# Patient Record
Sex: FEMALE | Race: BLACK OR AFRICAN AMERICAN | NOT HISPANIC OR LATINO | URBAN - METROPOLITAN AREA
[De-identification: names, ages, dates, MRNs, and addresses within clinical notes are randomized per-mention and may not be internally consistent; named-entity substitution may affect disease eponyms.]

---

## 2017-05-10 ENCOUNTER — IMPORTED ENCOUNTER (OUTPATIENT)
Dept: URBAN - METROPOLITAN AREA CLINIC 1 | Facility: CLINIC | Age: 72
End: 2017-05-10

## 2017-05-10 PROBLEM — H02.834: Noted: 2017-05-10

## 2017-05-10 PROBLEM — H04.123: Noted: 2017-05-10

## 2017-05-10 PROBLEM — H02.831: Noted: 2017-05-10

## 2017-05-10 PROBLEM — H16.143: Noted: 2017-05-10

## 2017-05-10 PROBLEM — H25.813: Noted: 2017-05-10

## 2017-05-10 PROCEDURE — 92015 DETERMINE REFRACTIVE STATE: CPT

## 2017-05-10 PROCEDURE — 92014 COMPRE OPH EXAM EST PT 1/>: CPT

## 2017-05-10 NOTE — PATIENT DISCUSSION
1.  Cataract OU: Observe for now without intervention. The patient was advised to contact us if any change or worsening of vision2. VALENTIN w/ PEK OU-The use/continuation of artificial tears were recommended. 3.  Dermatochalasis OU UL's  - Follow with no intervention at this time. MRX for glasses givenReturn for an appointment in 6 months 10/DFE/glare with Dr. Mojgan Evans.

## 2017-09-18 ENCOUNTER — HOSPITAL ENCOUNTER (OUTPATIENT)
Dept: MAMMOGRAPHY | Age: 72
Discharge: HOME | End: 2017-09-18
Attending: INTERNAL MEDICINE
Payer: MEDICARE

## 2017-09-18 DIAGNOSIS — Z12.31 VISIT FOR SCREENING MAMMOGRAM: ICD-10-CM

## 2017-09-18 PROCEDURE — 77063 BREAST TOMOSYNTHESIS BI: CPT

## 2018-02-19 ENCOUNTER — HOSPITAL ENCOUNTER (OUTPATIENT)
Dept: PREADMISSION TESTING | Age: 73
Discharge: HOME | End: 2018-02-19
Payer: MEDICARE

## 2018-02-19 LAB
ALBUMIN SERPL-MCNC: 3.4 G/DL (ref 3.4–5)
ALBUMIN/GLOB SERPL: 0.9 {RATIO} (ref 0.8–1.7)
ALP SERPL-CCNC: 80 U/L (ref 45–117)
ALT SERPL-CCNC: 22 U/L (ref 13–56)
ANION GAP SERPL CALC-SCNC: 8 MMOL/L (ref 3–18)
APPEARANCE UR: CLEAR
APTT PPP: 28.2 SEC (ref 23–36.4)
AST SERPL-CCNC: 17 U/L (ref 15–37)
ATRIAL RATE: 66 BPM
BACTERIA SPEC CULT: NORMAL
BASOPHILS # BLD: 0 K/UL (ref 0–0.06)
BASOPHILS NFR BLD: 1 % (ref 0–2)
BILIRUB SERPL-MCNC: 0.5 MG/DL (ref 0.2–1)
BILIRUB UR QL: NEGATIVE
BUN SERPL-MCNC: 18 MG/DL (ref 7–18)
BUN/CREAT SERPL: 24 (ref 12–20)
CALCIUM SERPL-MCNC: 9.2 MG/DL (ref 8.5–10.1)
CALCULATED P AXIS, ECG09: 60 DEGREES
CALCULATED R AXIS, ECG10: 55 DEGREES
CALCULATED T AXIS, ECG11: 50 DEGREES
CHLORIDE SERPL-SCNC: 105 MMOL/L (ref 100–108)
CO2 SERPL-SCNC: 30 MMOL/L (ref 21–32)
COLOR UR: YELLOW
CREAT SERPL-MCNC: 0.75 MG/DL (ref 0.6–1.3)
DIAGNOSIS, 93000: NORMAL
DIFFERENTIAL METHOD BLD: ABNORMAL
EOSINOPHIL # BLD: 0.2 K/UL (ref 0–0.4)
EOSINOPHIL NFR BLD: 4 % (ref 0–5)
ERYTHROCYTE [DISTWIDTH] IN BLOOD BY AUTOMATED COUNT: 15.8 % (ref 11.6–14.5)
ERYTHROCYTE [SEDIMENTATION RATE] IN BLOOD: 20 MM/HR (ref 0–30)
EST. AVERAGE GLUCOSE BLD GHB EST-MCNC: 131 MG/DL
GLOBULIN SER CALC-MCNC: 3.6 G/DL (ref 2–4)
GLUCOSE SERPL-MCNC: 97 MG/DL (ref 74–99)
GLUCOSE UR STRIP.AUTO-MCNC: NEGATIVE MG/DL
HBA1C MFR BLD: 6.2 % (ref 4.5–5.6)
HCT VFR BLD AUTO: 40.7 % (ref 35–45)
HGB BLD-MCNC: 12.6 G/DL (ref 12–16)
HGB UR QL STRIP: NEGATIVE
INR PPP: 1.1 (ref 0.8–1.2)
KETONES UR QL STRIP.AUTO: NEGATIVE MG/DL
LEUKOCYTE ESTERASE UR QL STRIP.AUTO: NEGATIVE
LYMPHOCYTES # BLD: 2.1 K/UL (ref 0.9–3.6)
LYMPHOCYTES NFR BLD: 45 % (ref 21–52)
MCH RBC QN AUTO: 25.5 PG (ref 24–34)
MCHC RBC AUTO-ENTMCNC: 31 G/DL (ref 31–37)
MCV RBC AUTO: 82.4 FL (ref 74–97)
MONOCYTES # BLD: 0.3 K/UL (ref 0.05–1.2)
MONOCYTES NFR BLD: 6 % (ref 3–10)
NEUTS SEG # BLD: 2.1 K/UL (ref 1.8–8)
NEUTS SEG NFR BLD: 44 % (ref 40–73)
NITRITE UR QL STRIP.AUTO: NEGATIVE
P-R INTERVAL, ECG05: 182 MS
PH UR STRIP: 7 [PH] (ref 5–8)
PLATELET # BLD AUTO: 128 K/UL (ref 135–420)
POTASSIUM SERPL-SCNC: 3.6 MMOL/L (ref 3.5–5.5)
PROT SERPL-MCNC: 7 G/DL (ref 6.4–8.2)
PROT UR STRIP-MCNC: NEGATIVE MG/DL
PROTHROMBIN TIME: 13.4 SEC (ref 11.5–15.2)
Q-T INTERVAL, ECG07: 424 MS
QRS DURATION, ECG06: 84 MS
QTC CALCULATION (BEZET), ECG08: 444 MS
RBC # BLD AUTO: 4.94 M/UL (ref 4.2–5.3)
SERVICE CMNT-IMP: NORMAL
SODIUM SERPL-SCNC: 143 MMOL/L (ref 136–145)
SP GR UR REFRACTOMETRY: 1.02 (ref 1–1.03)
UROBILINOGEN UR QL STRIP.AUTO: 1 EU/DL (ref 0.2–1)
VENTRICULAR RATE, ECG03: 66 BPM
WBC # BLD AUTO: 4.7 K/UL (ref 4.6–13.2)

## 2018-02-19 PROCEDURE — 85025 COMPLETE CBC W/AUTO DIFF WBC: CPT | Performed by: ORTHOPAEDIC SURGERY

## 2018-02-19 PROCEDURE — 36415 COLL VENOUS BLD VENIPUNCTURE: CPT | Performed by: ORTHOPAEDIC SURGERY

## 2018-02-19 PROCEDURE — 85730 THROMBOPLASTIN TIME PARTIAL: CPT | Performed by: ORTHOPAEDIC SURGERY

## 2018-02-19 PROCEDURE — 85610 PROTHROMBIN TIME: CPT | Performed by: ORTHOPAEDIC SURGERY

## 2018-02-19 PROCEDURE — 81003 URINALYSIS AUTO W/O SCOPE: CPT | Performed by: ORTHOPAEDIC SURGERY

## 2018-02-19 PROCEDURE — 85652 RBC SED RATE AUTOMATED: CPT | Performed by: ORTHOPAEDIC SURGERY

## 2018-02-19 PROCEDURE — 93005 ELECTROCARDIOGRAM TRACING: CPT

## 2018-02-19 PROCEDURE — 80053 COMPREHEN METABOLIC PANEL: CPT | Performed by: ORTHOPAEDIC SURGERY

## 2018-02-19 PROCEDURE — 87641 MR-STAPH DNA AMP PROBE: CPT | Performed by: ORTHOPAEDIC SURGERY

## 2018-02-19 PROCEDURE — 83036 HEMOGLOBIN GLYCOSYLATED A1C: CPT | Performed by: ORTHOPAEDIC SURGERY

## 2018-03-12 ENCOUNTER — ANESTHESIA EVENT (OUTPATIENT)
Dept: SURGERY | Age: 73
DRG: 470 | End: 2018-03-12
Payer: MEDICARE

## 2018-03-14 PROBLEM — M17.11 OSTEOARTHRITIS OF RIGHT KNEE: Chronic | Status: ACTIVE | Noted: 2018-03-14

## 2018-03-14 NOTE — H&P
[facility]  History and Physical Exam    Patient: Paulina Muñoz MRN: [id number]  SSN: [id number]    YOB: 1945  Age: 72 y.o. Sex: female      Subjective:      Chief Complaint: Right knee pain    History of Present Illness:  Patient complains of pain to the right knee and difficulty ambulating, which has progressively worsened over several months. X-rays showed osteoarthritis of the joint. The patient's pain has persisted and progressed despite conservative treatments and therapies. The patient has been previously treated with NSAIDs. The patient has at this time opted for surgical intervention. Past Medical History:   Diagnosis Date    Arthritis     knee    Hypertension     20 years    Ill-defined condition age 31    Sarcoidosis    Menopause     Osteoarthritis of right knee 3/14/2018    Thyroid disease     6 years     Past Surgical History:   Procedure Laterality Date    CHEST SURGERY PROCEDURE UNLISTED  27 y ago    lung biopsy for sarcoisosis    HX GYN  36 y ago    tubal pregnancy    HX HYSTERECTOMY  36 y ago     Social History     Occupational History    Not on file. Social History Main Topics    Smoking status: Never Smoker    Smokeless tobacco: Never Used    Alcohol use No    Drug use: No    Sexual activity: Not on file     Prior to Admission medications    Medication Sig Start Date End Date Taking? Authorizing Provider   potassium chloride SR (KLOR-CON 10) 10 mEq tablet Take 20 mEq by mouth. Historical Provider   levothyroxine (SYNTHROID) 75 mcg tablet Take  by mouth daily. Historical Provider   hydroCHLOROthiazide (HYDRODIURIL) 12.5 mg tablet Take 12.5 mg by mouth daily. Indications: hypertension    Historical Provider   calcium carbonate (OS-ALAYNA) 500 mg calcium (1,250 mg) tablet Take  by mouth daily. Historical Provider   valsartan (DIOVAN) 320 mg tablet Take 320 mg by mouth daily.     Historical Provider Allergies: Allergies   Allergen Reactions    Pcn [Penicillins] Rash        Review of Systems:  Pertinent items are noted in the History of Present Illness. Objective:       Physical Exam:  HEENT: Normocephalic, atraumatic  Lungs:  Clear to auscultation  Heart:   Regular rate and rhythm  Abdomen: Soft  Extremities:  Pain with range of motion of the right knee. Active ROM limited due to pain. Tenderness generalized. Crepitus present. Antalgic gait. Assessment:      Arthritis of the right knee. Plan:       Proceed with scheduled RIGHT TOTAL KNEE ARTHROPLASTY. Due to past medical history significant for HTN, sarcoidosis and thrombocytopenia, this patient may benefit from an inpatient admission for post operative monitoring of comorbid conditions. The various methods of treatment have been discussed with the patient and family. After consideration of risks, benefits, and other options for treatment, the patient has consented to surgical interventions. Questions were answered and preoperative teaching was done by Dr Cele Thorpe.      Signed By: CHRISTIAN Ann     March 14, 2018

## 2018-03-19 ENCOUNTER — HOSPITAL ENCOUNTER (INPATIENT)
Age: 73
LOS: 1 days | Discharge: HOME HEALTH | DRG: 470 | End: 2018-03-20
Attending: ORTHOPAEDIC SURGERY | Admitting: ORTHOPAEDIC SURGERY
Payer: MEDICARE

## 2018-03-19 ENCOUNTER — APPOINTMENT (OUTPATIENT)
Dept: GENERAL RADIOLOGY | Age: 73
DRG: 470 | End: 2018-03-19
Attending: PHYSICIAN ASSISTANT
Payer: MEDICARE

## 2018-03-19 ENCOUNTER — ANESTHESIA (OUTPATIENT)
Dept: SURGERY | Age: 73
DRG: 470 | End: 2018-03-19
Payer: MEDICARE

## 2018-03-19 DIAGNOSIS — M17.11 PRIMARY OSTEOARTHRITIS OF RIGHT KNEE: Primary | Chronic | ICD-10-CM

## 2018-03-19 LAB
ABO + RH BLD: NORMAL
BLOOD GROUP ANTIBODIES SERPL: NORMAL
GLUCOSE BLD STRIP.AUTO-MCNC: 104 MG/DL (ref 70–110)
SPECIMEN EXP DATE BLD: NORMAL

## 2018-03-19 PROCEDURE — 77030016060 HC NDL NRV BLK TELE -A: Performed by: ANESTHESIOLOGY

## 2018-03-19 PROCEDURE — 77030027138 HC INCENT SPIROMETER -A: Performed by: ORTHOPAEDIC SURGERY

## 2018-03-19 PROCEDURE — C9290 INJ, BUPIVACAINE LIPOSOME: HCPCS | Performed by: ORTHOPAEDIC SURGERY

## 2018-03-19 PROCEDURE — 76060000033 HC ANESTHESIA 1 TO 1.5 HR: Performed by: ORTHOPAEDIC SURGERY

## 2018-03-19 PROCEDURE — 74011250637 HC RX REV CODE- 250/637: Performed by: ANESTHESIOLOGY

## 2018-03-19 PROCEDURE — 82962 GLUCOSE BLOOD TEST: CPT

## 2018-03-19 PROCEDURE — 0SRC0J9 REPLACEMENT OF RIGHT KNEE JOINT WITH SYNTHETIC SUBSTITUTE, CEMENTED, OPEN APPROACH: ICD-10-PCS | Performed by: ORTHOPAEDIC SURGERY

## 2018-03-19 PROCEDURE — 74011250636 HC RX REV CODE- 250/636: Performed by: ORTHOPAEDIC SURGERY

## 2018-03-19 PROCEDURE — 77030012508 HC MSK AIRWY LMA AMBU -A: Performed by: ANESTHESIOLOGY

## 2018-03-19 PROCEDURE — 74011250636 HC RX REV CODE- 250/636: Performed by: ANESTHESIOLOGY

## 2018-03-19 PROCEDURE — 86900 BLOOD TYPING SEROLOGIC ABO: CPT | Performed by: ORTHOPAEDIC SURGERY

## 2018-03-19 PROCEDURE — 76942 ECHO GUIDE FOR BIOPSY: CPT | Performed by: ORTHOPAEDIC SURGERY

## 2018-03-19 PROCEDURE — 74011250637 HC RX REV CODE- 250/637: Performed by: PHYSICIAN ASSISTANT

## 2018-03-19 PROCEDURE — 77030020259 HC SOL INJ SOD CL 0.9% 100ML BG: Performed by: ORTHOPAEDIC SURGERY

## 2018-03-19 PROCEDURE — 74011250636 HC RX REV CODE- 250/636

## 2018-03-19 PROCEDURE — 97161 PT EVAL LOW COMPLEX 20 MIN: CPT

## 2018-03-19 PROCEDURE — 77030032489 HC SLV COMPR SCD FT CUF COVD -B: Performed by: ORTHOPAEDIC SURGERY

## 2018-03-19 PROCEDURE — G8979 MOBILITY GOAL STATUS: HCPCS

## 2018-03-19 PROCEDURE — 77030020407 HC IV BLD WRMR ST 3M -A: Performed by: ANESTHESIOLOGY

## 2018-03-19 PROCEDURE — G8978 MOBILITY CURRENT STATUS: HCPCS

## 2018-03-19 PROCEDURE — C1776 JOINT DEVICE (IMPLANTABLE): HCPCS | Performed by: ORTHOPAEDIC SURGERY

## 2018-03-19 PROCEDURE — 76010000149 HC OR TIME 1 TO 1.5 HR: Performed by: ORTHOPAEDIC SURGERY

## 2018-03-19 PROCEDURE — C1713 ANCHOR/SCREW BN/BN,TIS/BN: HCPCS | Performed by: ORTHOPAEDIC SURGERY

## 2018-03-19 PROCEDURE — 77030002934 HC SUT MCRYL J&J -B: Performed by: ORTHOPAEDIC SURGERY

## 2018-03-19 PROCEDURE — 77030037876 HC DRSG MEPILEX 16-48IN BORD MOLN -A: Performed by: ORTHOPAEDIC SURGERY

## 2018-03-19 PROCEDURE — 36415 COLL VENOUS BLD VENIPUNCTURE: CPT | Performed by: ORTHOPAEDIC SURGERY

## 2018-03-19 PROCEDURE — 77030034694 HC SCPL CANADY PLSM DISP USMD -E: Performed by: ORTHOPAEDIC SURGERY

## 2018-03-19 PROCEDURE — 73560 X-RAY EXAM OF KNEE 1 OR 2: CPT

## 2018-03-19 PROCEDURE — 97116 GAIT TRAINING THERAPY: CPT

## 2018-03-19 PROCEDURE — 77030020782 HC GWN BAIR PAWS FLX 3M -B: Performed by: ORTHOPAEDIC SURGERY

## 2018-03-19 PROCEDURE — 77030003666 HC NDL SPINAL BD -A: Performed by: ORTHOPAEDIC SURGERY

## 2018-03-19 PROCEDURE — 77030020813 HC INST SCULP CEM KT DISP S&N -B: Performed by: ORTHOPAEDIC SURGERY

## 2018-03-19 PROCEDURE — 74011000250 HC RX REV CODE- 250

## 2018-03-19 PROCEDURE — 74011000258 HC RX REV CODE- 258: Performed by: ORTHOPAEDIC SURGERY

## 2018-03-19 PROCEDURE — 77030018836 HC SOL IRR NACL ICUM -A: Performed by: ORTHOPAEDIC SURGERY

## 2018-03-19 PROCEDURE — 74011000250 HC RX REV CODE- 250: Performed by: ORTHOPAEDIC SURGERY

## 2018-03-19 PROCEDURE — 77030038011: Performed by: ORTHOPAEDIC SURGERY

## 2018-03-19 PROCEDURE — 77030013708 HC HNDPC SUC IRR PULS STRY –B: Performed by: ORTHOPAEDIC SURGERY

## 2018-03-19 PROCEDURE — 77030011640 HC PAD GRND REM COVD -A: Performed by: ORTHOPAEDIC SURGERY

## 2018-03-19 PROCEDURE — 76210000006 HC OR PH I REC 0.5 TO 1 HR: Performed by: ORTHOPAEDIC SURGERY

## 2018-03-19 PROCEDURE — 77030038010: Performed by: ORTHOPAEDIC SURGERY

## 2018-03-19 PROCEDURE — 77030031139 HC SUT VCRL2 J&J -A: Performed by: ORTHOPAEDIC SURGERY

## 2018-03-19 PROCEDURE — 3E0T3BZ INTRODUCTION OF ANESTHETIC AGENT INTO PERIPHERAL NERVES AND PLEXI, PERCUTANEOUS APPROACH: ICD-10-PCS | Performed by: ANESTHESIOLOGY

## 2018-03-19 PROCEDURE — 65270000029 HC RM PRIVATE

## 2018-03-19 PROCEDURE — 64447 NJX AA&/STRD FEMORAL NRV IMG: CPT | Performed by: ANESTHESIOLOGY

## 2018-03-19 PROCEDURE — 77030012893

## 2018-03-19 PROCEDURE — 77030011628: Performed by: ORTHOPAEDIC SURGERY

## 2018-03-19 PROCEDURE — 77010033678 HC OXYGEN DAILY

## 2018-03-19 PROCEDURE — 74011250636 HC RX REV CODE- 250/636: Performed by: PHYSICIAN ASSISTANT

## 2018-03-19 PROCEDURE — 77030037400 HC ADH TISS HI VISC EXOFIN CHMP -B: Performed by: ORTHOPAEDIC SURGERY

## 2018-03-19 DEVICE — COMPONENT FEM SZ 4 R KNEE POST STBL CEM ATTUNE: Type: IMPLANTABLE DEVICE | Site: KNEE | Status: FUNCTIONAL

## 2018-03-19 DEVICE — INSERT TIB RP FEM KNEE CEM: Type: IMPLANTABLE DEVICE | Site: KNEE | Status: FUNCTIONAL

## 2018-03-19 DEVICE — COMPONENT PAT DIA35MM KNEE POLY DOME CEM MEDIALIZED ATTUNE: Type: IMPLANTABLE DEVICE | Site: KNEE | Status: FUNCTIONAL

## 2018-03-19 DEVICE — CEMENT BNE 20GM HALF DOSE PMMA VISC RADPQ FAST: Type: IMPLANTABLE DEVICE | Site: KNEE | Status: FUNCTIONAL

## 2018-03-19 RX ORDER — DIPHENHYDRAMINE HCL 25 MG
25 CAPSULE ORAL
Status: DISCONTINUED | OUTPATIENT
Start: 2018-03-19 | End: 2018-03-20 | Stop reason: HOSPADM

## 2018-03-19 RX ORDER — POTASSIUM CHLORIDE 20 MEQ/1
20 TABLET, EXTENDED RELEASE ORAL DAILY
Status: DISCONTINUED | OUTPATIENT
Start: 2018-03-20 | End: 2018-03-20 | Stop reason: HOSPADM

## 2018-03-19 RX ORDER — HYDROMORPHONE HYDROCHLORIDE 2 MG/ML
0.2 INJECTION, SOLUTION INTRAMUSCULAR; INTRAVENOUS; SUBCUTANEOUS
Status: DISCONTINUED | OUTPATIENT
Start: 2018-03-19 | End: 2018-03-19 | Stop reason: HOSPADM

## 2018-03-19 RX ORDER — ASPIRIN 81 MG/1
81 TABLET ORAL 2 TIMES DAILY
Qty: 42 TAB | Refills: 0 | Status: SHIPPED | OUTPATIENT
Start: 2018-03-19 | End: 2018-04-09

## 2018-03-19 RX ORDER — ACETAMINOPHEN 10 MG/ML
1000 INJECTION, SOLUTION INTRAVENOUS ONCE
Status: COMPLETED | OUTPATIENT
Start: 2018-03-19 | End: 2018-03-19

## 2018-03-19 RX ORDER — ROPIVACAINE HYDROCHLORIDE 5 MG/ML
INJECTION, SOLUTION EPIDURAL; INFILTRATION; PERINEURAL AS NEEDED
Status: DISCONTINUED | OUTPATIENT
Start: 2018-03-19 | End: 2018-03-19 | Stop reason: HOSPADM

## 2018-03-19 RX ORDER — HYDROCHLOROTHIAZIDE 25 MG/1
12.5 TABLET ORAL DAILY
Status: DISCONTINUED | OUTPATIENT
Start: 2018-03-20 | End: 2018-03-20 | Stop reason: HOSPADM

## 2018-03-19 RX ORDER — CALCIUM CARBONATE 500(1250)
500 TABLET ORAL DAILY
Status: DISCONTINUED | OUTPATIENT
Start: 2018-03-20 | End: 2018-03-20 | Stop reason: HOSPADM

## 2018-03-19 RX ORDER — LIDOCAINE HYDROCHLORIDE 20 MG/ML
INJECTION, SOLUTION EPIDURAL; INFILTRATION; INTRACAUDAL; PERINEURAL AS NEEDED
Status: DISCONTINUED | OUTPATIENT
Start: 2018-03-19 | End: 2018-03-19 | Stop reason: HOSPADM

## 2018-03-19 RX ORDER — DEXAMETHASONE SODIUM PHOSPHATE 4 MG/ML
8 INJECTION, SOLUTION INTRA-ARTICULAR; INTRALESIONAL; INTRAMUSCULAR; INTRAVENOUS; SOFT TISSUE ONCE
Status: DISCONTINUED | OUTPATIENT
Start: 2018-03-19 | End: 2018-03-19 | Stop reason: DRUGHIGH

## 2018-03-19 RX ORDER — ASPIRIN 81 MG/1
81 TABLET ORAL 2 TIMES DAILY
Status: DISCONTINUED | OUTPATIENT
Start: 2018-03-19 | End: 2018-03-20 | Stop reason: HOSPADM

## 2018-03-19 RX ORDER — ONDANSETRON 2 MG/ML
4 INJECTION INTRAMUSCULAR; INTRAVENOUS
Status: DISCONTINUED | OUTPATIENT
Start: 2018-03-19 | End: 2018-03-20 | Stop reason: HOSPADM

## 2018-03-19 RX ORDER — DEXAMETHASONE SODIUM PHOSPHATE 4 MG/ML
4 INJECTION, SOLUTION INTRA-ARTICULAR; INTRALESIONAL; INTRAMUSCULAR; INTRAVENOUS; SOFT TISSUE ONCE
Status: COMPLETED | OUTPATIENT
Start: 2018-03-19 | End: 2018-03-19

## 2018-03-19 RX ORDER — VALSARTAN 160 MG/1
320 TABLET ORAL DAILY
Status: DISCONTINUED | OUTPATIENT
Start: 2018-03-20 | End: 2018-03-20 | Stop reason: HOSPADM

## 2018-03-19 RX ORDER — TRANEXAMIC ACID 650 1/1
1950 TABLET ORAL ONCE
Status: COMPLETED | OUTPATIENT
Start: 2018-03-19 | End: 2018-03-19

## 2018-03-19 RX ORDER — DOCUSATE SODIUM 100 MG/1
100 CAPSULE, LIQUID FILLED ORAL 2 TIMES DAILY
Status: DISCONTINUED | OUTPATIENT
Start: 2018-03-19 | End: 2018-03-20 | Stop reason: HOSPADM

## 2018-03-19 RX ORDER — SODIUM CHLORIDE 0.9 % (FLUSH) 0.9 %
5-10 SYRINGE (ML) INJECTION AS NEEDED
Status: DISCONTINUED | OUTPATIENT
Start: 2018-03-19 | End: 2018-03-19 | Stop reason: HOSPADM

## 2018-03-19 RX ORDER — SODIUM CHLORIDE, SODIUM LACTATE, POTASSIUM CHLORIDE, CALCIUM CHLORIDE 600; 310; 30; 20 MG/100ML; MG/100ML; MG/100ML; MG/100ML
125 INJECTION, SOLUTION INTRAVENOUS CONTINUOUS
Status: DISCONTINUED | OUTPATIENT
Start: 2018-03-19 | End: 2018-03-19 | Stop reason: HOSPADM

## 2018-03-19 RX ORDER — OXYCODONE AND ACETAMINOPHEN 5; 325 MG/1; MG/1
TABLET ORAL
Qty: 60 TAB | Refills: 0 | Status: SHIPPED | OUTPATIENT
Start: 2018-03-19

## 2018-03-19 RX ORDER — LEVOTHYROXINE SODIUM 75 UG/1
75 TABLET ORAL DAILY
Status: DISCONTINUED | OUTPATIENT
Start: 2018-03-20 | End: 2018-03-20 | Stop reason: HOSPADM

## 2018-03-19 RX ORDER — VANCOMYCIN/0.9 % SOD CHLORIDE 1.5G/250ML
1500 PLASTIC BAG, INJECTION (ML) INTRAVENOUS EVERY 12 HOURS
Status: COMPLETED | OUTPATIENT
Start: 2018-03-19 | End: 2018-03-20

## 2018-03-19 RX ORDER — CELECOXIB 100 MG/1
200 CAPSULE ORAL
Status: COMPLETED | OUTPATIENT
Start: 2018-03-19 | End: 2018-03-19

## 2018-03-19 RX ORDER — OXYCODONE HYDROCHLORIDE 5 MG/1
5-10 TABLET ORAL
Status: DISCONTINUED | OUTPATIENT
Start: 2018-03-19 | End: 2018-03-20 | Stop reason: HOSPADM

## 2018-03-19 RX ORDER — HYDROMORPHONE HYDROCHLORIDE 2 MG/ML
INJECTION, SOLUTION INTRAMUSCULAR; INTRAVENOUS; SUBCUTANEOUS AS NEEDED
Status: DISCONTINUED | OUTPATIENT
Start: 2018-03-19 | End: 2018-03-19 | Stop reason: HOSPADM

## 2018-03-19 RX ORDER — ONDANSETRON 2 MG/ML
INJECTION INTRAMUSCULAR; INTRAVENOUS AS NEEDED
Status: DISCONTINUED | OUTPATIENT
Start: 2018-03-19 | End: 2018-03-19 | Stop reason: HOSPADM

## 2018-03-19 RX ORDER — LANOLIN ALCOHOL/MO/W.PET/CERES
1 CREAM (GRAM) TOPICAL 3 TIMES DAILY
Status: DISCONTINUED | OUTPATIENT
Start: 2018-03-19 | End: 2018-03-20 | Stop reason: HOSPADM

## 2018-03-19 RX ORDER — FENTANYL CITRATE 50 UG/ML
INJECTION, SOLUTION INTRAMUSCULAR; INTRAVENOUS AS NEEDED
Status: DISCONTINUED | OUTPATIENT
Start: 2018-03-19 | End: 2018-03-19 | Stop reason: HOSPADM

## 2018-03-19 RX ORDER — METOCLOPRAMIDE HYDROCHLORIDE 5 MG/ML
10 INJECTION INTRAMUSCULAR; INTRAVENOUS
Status: DISCONTINUED | OUTPATIENT
Start: 2018-03-19 | End: 2018-03-20 | Stop reason: HOSPADM

## 2018-03-19 RX ORDER — ZOLPIDEM TARTRATE 5 MG/1
5-10 TABLET ORAL
Status: DISCONTINUED | OUTPATIENT
Start: 2018-03-19 | End: 2018-03-20 | Stop reason: HOSPADM

## 2018-03-19 RX ORDER — PANTOPRAZOLE SODIUM 40 MG/1
40 TABLET, DELAYED RELEASE ORAL DAILY
Status: DISCONTINUED | OUTPATIENT
Start: 2018-03-19 | End: 2018-03-20 | Stop reason: HOSPADM

## 2018-03-19 RX ORDER — VANCOMYCIN/0.9 % SOD CHLORIDE 1.5G/250ML
1500 PLASTIC BAG, INJECTION (ML) INTRAVENOUS ONCE
Status: COMPLETED | OUTPATIENT
Start: 2018-03-19 | End: 2018-03-19

## 2018-03-19 RX ORDER — DEXMEDETOMIDINE HYDROCHLORIDE 4 UG/ML
INJECTION, SOLUTION INTRAVENOUS AS NEEDED
Status: DISCONTINUED | OUTPATIENT
Start: 2018-03-19 | End: 2018-03-19 | Stop reason: HOSPADM

## 2018-03-19 RX ORDER — KETOROLAC TROMETHAMINE 15 MG/ML
15 INJECTION, SOLUTION INTRAMUSCULAR; INTRAVENOUS EVERY 6 HOURS
Status: DISCONTINUED | OUTPATIENT
Start: 2018-03-19 | End: 2018-03-20 | Stop reason: HOSPADM

## 2018-03-19 RX ORDER — SODIUM CHLORIDE 9 MG/ML
125 INJECTION, SOLUTION INTRAVENOUS CONTINUOUS
Status: DISPENSED | OUTPATIENT
Start: 2018-03-19 | End: 2018-03-20

## 2018-03-19 RX ORDER — SODIUM CHLORIDE 0.9 % (FLUSH) 0.9 %
5-10 SYRINGE (ML) INJECTION AS NEEDED
Status: DISCONTINUED | OUTPATIENT
Start: 2018-03-19 | End: 2018-03-20 | Stop reason: HOSPADM

## 2018-03-19 RX ORDER — DIPHENHYDRAMINE HYDROCHLORIDE 50 MG/ML
12.5 INJECTION, SOLUTION INTRAMUSCULAR; INTRAVENOUS
Status: DISCONTINUED | OUTPATIENT
Start: 2018-03-19 | End: 2018-03-20 | Stop reason: HOSPADM

## 2018-03-19 RX ORDER — SODIUM CHLORIDE 0.9 % (FLUSH) 0.9 %
5-10 SYRINGE (ML) INJECTION EVERY 8 HOURS
Status: DISCONTINUED | OUTPATIENT
Start: 2018-03-19 | End: 2018-03-20 | Stop reason: HOSPADM

## 2018-03-19 RX ORDER — PROPOFOL 10 MG/ML
INJECTION, EMULSION INTRAVENOUS AS NEEDED
Status: DISCONTINUED | OUTPATIENT
Start: 2018-03-19 | End: 2018-03-19 | Stop reason: HOSPADM

## 2018-03-19 RX ORDER — NALOXONE HYDROCHLORIDE 0.4 MG/ML
0.4 INJECTION, SOLUTION INTRAMUSCULAR; INTRAVENOUS; SUBCUTANEOUS AS NEEDED
Status: DISCONTINUED | OUTPATIENT
Start: 2018-03-19 | End: 2018-03-20 | Stop reason: HOSPADM

## 2018-03-19 RX ORDER — PREGABALIN 25 MG/1
25 CAPSULE ORAL
Status: COMPLETED | OUTPATIENT
Start: 2018-03-19 | End: 2018-03-19

## 2018-03-19 RX ORDER — SODIUM CHLORIDE, SODIUM LACTATE, POTASSIUM CHLORIDE, CALCIUM CHLORIDE 600; 310; 30; 20 MG/100ML; MG/100ML; MG/100ML; MG/100ML
125 INJECTION, SOLUTION INTRAVENOUS CONTINUOUS
Status: DISCONTINUED | OUTPATIENT
Start: 2018-03-19 | End: 2018-03-20 | Stop reason: HOSPADM

## 2018-03-19 RX ORDER — MIDAZOLAM HYDROCHLORIDE 1 MG/ML
INJECTION, SOLUTION INTRAMUSCULAR; INTRAVENOUS AS NEEDED
Status: DISCONTINUED | OUTPATIENT
Start: 2018-03-19 | End: 2018-03-19 | Stop reason: HOSPADM

## 2018-03-19 RX ORDER — SODIUM CHLORIDE 9 MG/ML
300 INJECTION, SOLUTION INTRAVENOUS CONTINUOUS
Status: DISPENSED | OUTPATIENT
Start: 2018-03-19 | End: 2018-03-19

## 2018-03-19 RX ADMIN — SODIUM CHLORIDE, SODIUM LACTATE, POTASSIUM CHLORIDE, AND CALCIUM CHLORIDE 1000 ML: 600; 310; 30; 20 INJECTION, SOLUTION INTRAVENOUS at 08:53

## 2018-03-19 RX ADMIN — MIDAZOLAM HYDROCHLORIDE 2 MG: 1 INJECTION, SOLUTION INTRAMUSCULAR; INTRAVENOUS at 10:17

## 2018-03-19 RX ADMIN — ONDANSETRON 4 MG: 2 INJECTION INTRAMUSCULAR; INTRAVENOUS at 16:04

## 2018-03-19 RX ADMIN — SODIUM CHLORIDE, SODIUM LACTATE, POTASSIUM CHLORIDE, AND CALCIUM CHLORIDE: 600; 310; 30; 20 INJECTION, SOLUTION INTRAVENOUS at 10:17

## 2018-03-19 RX ADMIN — VANCOMYCIN HYDROCHLORIDE 1500 MG: 10 INJECTION, POWDER, LYOPHILIZED, FOR SOLUTION INTRAVENOUS at 09:39

## 2018-03-19 RX ADMIN — DEXAMETHASONE SODIUM PHOSPHATE 4 MG: 4 INJECTION, SOLUTION INTRAMUSCULAR; INTRAVENOUS at 09:14

## 2018-03-19 RX ADMIN — PROPOFOL 200 MG: 10 INJECTION, EMULSION INTRAVENOUS at 10:19

## 2018-03-19 RX ADMIN — SODIUM CHLORIDE, SODIUM LACTATE, POTASSIUM CHLORIDE, AND CALCIUM CHLORIDE 125 ML/HR: 600; 310; 30; 20 INJECTION, SOLUTION INTRAVENOUS at 08:53

## 2018-03-19 RX ADMIN — OXYCODONE HYDROCHLORIDE 5 MG: 5 TABLET ORAL at 14:19

## 2018-03-19 RX ADMIN — ASPIRIN 81 MG: 81 TABLET, COATED ORAL at 18:54

## 2018-03-19 RX ADMIN — FERROUS SULFATE TAB 325 MG (65 MG ELEMENTAL FE) 325 MG: 325 (65 FE) TAB at 21:15

## 2018-03-19 RX ADMIN — PANTOPRAZOLE SODIUM 40 MG: 40 TABLET, DELAYED RELEASE ORAL at 09:14

## 2018-03-19 RX ADMIN — DOCUSATE SODIUM 100 MG: 100 CAPSULE, LIQUID FILLED ORAL at 17:50

## 2018-03-19 RX ADMIN — HYDROMORPHONE HYDROCHLORIDE 1 MG: 2 INJECTION, SOLUTION INTRAMUSCULAR; INTRAVENOUS; SUBCUTANEOUS at 10:30

## 2018-03-19 RX ADMIN — ONDANSETRON 4 MG: 2 INJECTION INTRAMUSCULAR; INTRAVENOUS at 10:22

## 2018-03-19 RX ADMIN — ACETAMINOPHEN 1000 MG: 10 INJECTION, SOLUTION INTRAVENOUS at 10:24

## 2018-03-19 RX ADMIN — OXYCODONE HYDROCHLORIDE 10 MG: 5 TABLET ORAL at 18:54

## 2018-03-19 RX ADMIN — ROPIVACAINE HYDROCHLORIDE 20 ML: 5 INJECTION, SOLUTION EPIDURAL; INFILTRATION; PERINEURAL at 09:31

## 2018-03-19 RX ADMIN — LIDOCAINE HYDROCHLORIDE 100 MG: 20 INJECTION, SOLUTION EPIDURAL; INFILTRATION; INTRACAUDAL; PERINEURAL at 10:19

## 2018-03-19 RX ADMIN — MIDAZOLAM HYDROCHLORIDE 2 MG: 1 INJECTION, SOLUTION INTRAMUSCULAR; INTRAVENOUS at 09:27

## 2018-03-19 RX ADMIN — FERROUS SULFATE TAB 325 MG (65 MG ELEMENTAL FE) 325 MG: 325 (65 FE) TAB at 17:50

## 2018-03-19 RX ADMIN — DEXMEDETOMIDINE HYDROCHLORIDE 8 MCG: 4 INJECTION, SOLUTION INTRAVENOUS at 10:32

## 2018-03-19 RX ADMIN — PREGABALIN 25 MG: 25 CAPSULE ORAL at 09:14

## 2018-03-19 RX ADMIN — FENTANYL CITRATE 50 MCG: 50 INJECTION, SOLUTION INTRAMUSCULAR; INTRAVENOUS at 10:47

## 2018-03-19 RX ADMIN — SODIUM CHLORIDE 300 ML/HR: 900 INJECTION, SOLUTION INTRAVENOUS at 13:14

## 2018-03-19 RX ADMIN — KETOROLAC TROMETHAMINE 15 MG: 15 INJECTION, SOLUTION INTRAMUSCULAR; INTRAVENOUS at 12:31

## 2018-03-19 RX ADMIN — ONDANSETRON 4 MG: 2 INJECTION INTRAMUSCULAR; INTRAVENOUS at 11:11

## 2018-03-19 RX ADMIN — DEXMEDETOMIDINE HYDROCHLORIDE 8 MCG: 4 INJECTION, SOLUTION INTRAVENOUS at 10:38

## 2018-03-19 RX ADMIN — SODIUM CHLORIDE 125 ML/HR: 900 INJECTION, SOLUTION INTRAVENOUS at 16:28

## 2018-03-19 RX ADMIN — VANCOMYCIN HYDROCHLORIDE 1500 MG: 10 INJECTION, POWDER, LYOPHILIZED, FOR SOLUTION INTRAVENOUS at 21:15

## 2018-03-19 RX ADMIN — CELECOXIB 200 MG: 100 CAPSULE ORAL at 09:14

## 2018-03-19 RX ADMIN — FENTANYL CITRATE 50 MCG: 50 INJECTION, SOLUTION INTRAMUSCULAR; INTRAVENOUS at 10:40

## 2018-03-19 RX ADMIN — KETOROLAC TROMETHAMINE 15 MG: 15 INJECTION, SOLUTION INTRAMUSCULAR; INTRAVENOUS at 17:51

## 2018-03-19 RX ADMIN — TRANEXAMIC ACID 1950 MG: 650 TABLET ORAL at 08:28

## 2018-03-19 NOTE — ANESTHESIA POSTPROCEDURE EVALUATION
Post-Anesthesia Evaluation and Assessment    Cardiovascular Function/Vital Signs  Visit Vitals    /69    Pulse 63    Temp 36.6 °C (97.9 °F)    Resp 12    Ht 5' 2\" (1.575 m)    Wt 92 kg (202 lb 12.8 oz)    SpO2 99%    BMI 37.09 kg/m2       Patient is status post Procedure(s):  RIGHT TOTAL KNEE REPLACEMENT. Nausea/Vomiting: Controlled. Postoperative hydration reviewed and adequate. Pain:  Pain Scale 1: FLACC (03/19/18 1233)  Pain Intensity 1: 0 (03/19/18 1233)   Managed. Neurological Status:   Neuro (WDL): Exceptions to WDL (03/19/18 1142)   At baseline. Mental Status and Level of Consciousness: Baseline and stable. Pulmonary Status:   O2 Device: Nasal cannula (03/19/18 1232)   Adequate oxygenation and airway patent. Complications related to anesthesia: None    Post-anesthesia assessment completed. No concerns. Patient has met all discharge requirements.     Signed By: Keri Mac MD

## 2018-03-19 NOTE — IP AVS SNAPSHOT
[address] [place] [id number] 
[phone] Patient: Rodolfo Nair MRN: [id number] :1945 A check delta indicates which time of day the medication should be taken. My Medications START taking these medications Instructions Each Dose to Equal  
 Morning Noon Evening Bedtime  
 aspirin delayed-release 81 mg tablet Your last dose was: Your next dose is: Take 1 Tab by mouth two (2) times a day for 21 days. 81 mg  
    
   
   
   
  
 oxyCODONE-acetaminophen 5-325 mg per tablet Commonly known as:  PERCOCET Your last dose was: Your next dose is: Take 1-2 tablets by mouth every 4-6 hours as needed for pain. CONTINUE taking these medications Instructions Each Dose to Equal  
 Morning Noon Evening Bedtime  
 calcium carbonate 500 mg calcium (1,250 mg) tablet Commonly known as:  OS-ALAYNA Your last dose was: Your next dose is: Take  by mouth daily. DIOVAN 320 mg tablet Generic drug:  valsartan Your last dose was: Your next dose is: Take 320 mg by mouth daily. 320 mg  
    
   
   
   
  
 hydroCHLOROthiazide 12.5 mg tablet Commonly known as:  HYDRODIURIL Your last dose was: Your next dose is: Take 12.5 mg by mouth daily. Indications: hypertension 12.5 mg  
    
   
   
   
  
 potassium chloride SR 10 mEq tablet Commonly known as:  KLOR-CON 10 Your last dose was: Your next dose is: Take 20 mEq by mouth. 20 mEq SYNTHROID 75 mcg tablet Generic drug:  levothyroxine Your last dose was: Your next dose is: Take  by mouth daily. Where to Get Your Medications Information on where to get these meds will be given to you by the nurse or doctor. ! Ask your nurse or doctor about these medications  
  aspirin delayed-release 81 mg tablet  
 oxyCODONE-acetaminophen 5-325 mg per tablet

## 2018-03-19 NOTE — PROGRESS NOTES
TRANSFER - IN REPORT:    Verbal report received from Nadia MERCADO RN(name) on Earline Lowe  being received from [facility]) for routine post - op      Report consisted of patients Situation, Background, Assessment and   Recommendations(SBAR). Information from the following report(s) SBAR, Kardex, Intake/Output, MAR, Cardiac Rhythm NSR and Alarm Parameters  was reviewed with the receiving nurse. Opportunity for questions and clarification was provided. Assessment completed upon patients arrival to unit and care assumed.

## 2018-03-19 NOTE — PERIOP NOTES
TRANSFER - OUT REPORT:    Verbal report given to Ciara Meadows (name) on Nereida Mccormick  being transferred to [facility](unit) for routine progression of care       Report consisted of patients Situation, Background, Assessment and   Recommendations(SBAR). Information from the following report(s) Procedure Summary, Intake/Output, MAR and Recent Results was reviewed with the receiving nurse. Lines:   Peripheral IV 03/19/18 Left; Inner Forearm (Active)   Site Assessment Clean, dry, & intact 3/19/2018 12:36 PM   Phlebitis Assessment 0 3/19/2018 12:36 PM   Infiltration Assessment 0 3/19/2018 12:36 PM   Dressing Status Clean, dry, & intact 3/19/2018 12:36 PM   Dressing Type Tape;Transparent 3/19/2018 12:36 PM   Hub Color/Line Status Infusing 3/19/2018 12:36 PM        Opportunity for questions and clarification was provided.       Patient transported with:   O2 @ 2 liters   Also reviewed history as recorded in EMR

## 2018-03-19 NOTE — OP NOTES
[address] [address],[address]  Total Knee Arthroplasty    Patient: Samir Uribe MRN: [id number]  SSN: [id number]    YOB: 1945  Age: 72 y.o. Sex: female      Date of Surgery: 3/19/2018   Preoperative Diagnosis: RIGHT KNEE OSTEOARTHRITIS   Postoperative Diagnosis: RIGHT KNEE OSTEOARTHRITIS   Location: [facility]  Surgeon: Hudson Tamayo MD  Assistant: Romero Panda PA-C    Anesthesia: General and Femoral Nerve Block    Procedure: Total Knee Arthroplasty: Depuy   The complexity of the total joint surgery requires the use of a first assistant for positioning, retraction and assistance in closure. Tourniquet Time: Tourniquet not used. Estimated Blood Loss: Less than 100cc     Implants:   Implant Name Type Inv. Item Serial No.  Lot No. LRB No. Used Action   CEMENT BNE FAST SET 20GM --  - [id number]  CEMENT BNE FAST SET 20GM --   [facility] [employer] [id number] Right 1 Implanted   FEM PS SZ 4 RT AISSATOU -- ATTUNE - [id number]  FEM PS SZ 4 RT AISSATOU -- ATTUNE  [facility] [employer] [id number] Right 1 Implanted   ATTUNE TIBIAL INSERT    [facility] [employer] [id number] Right 1 Implanted   ATTUNE TIBIAL BASE    [facility] [employer] [id number] Right 1 Implanted   PAT AISSATOU DOME MEDIAL 35MM -- ATTUNE - [id number]   PAT AISSATOU DOME MEDIAL 35MM -- ATTUNE   [facility] [employer] [id number] Right 1 Implanted        Specimens: None    Additional Findings: None     Body Mass Index: Body mass index is 37.09 kg/(m^2). Procedure Detail:  Prior to the surgery the patient was administered a femoral nerve block in the preoperative holding area by the anesthesiologist. Samir Uribe was brought to the operating room and positioned on the operating table. She was anesthetized with anesthesia. Intravenous antibiotics were administered. Prior to the incision being made a timeout was called identifying the patient, procedure, operative side, and surgeon.  A pneumatic tourniquet was placed about the limb and the right leg was prepped and draped in the usual sterile manner. The tourniquet was not inflated throughout the case. A midline anterior incision made over the knee. The incision was carried down through the subcutaneous tissue to the underlying capsule. A medial parapatellar capsular incision was performed. The medial capsular flap was carefully elevated around to the posterior medial corner protecting the medial collateral ligaments and the fibers. The patella was sized with a caliper, and approximately 10-12 mm was resected with an oscillating saw allowing the patella to be slid into the lateral gutter. It was not everted throughout the case. Our attention was first turned to the distal femur and using intermedullary instrumentation, a 5-degree valgus cut was on the distal end of the femur. The distal end of the femur was sized to a size 4 femoral component. Pins were inserted through the sizer and the corresponding 4-in-1 block was slid into place and pinned for stability. Anterior and posterior and chamfer cuts were made to accommodate the femoral component. The medial and lateral menisci were excised as were the anterior cruciate ligaments. Our attention was then turned to the tibia. Using extramedullary instrumentation, a 3-degree cut was made on the proximal end of the tibia. A spacer block was placed to show gaps had been balanced and a size 4  tibial base plate was placed on the tibia and pinned into place. Intramedullary reaming guide was placed on the tibia and the appropriate reamer was used followed by the keel punch to complete the preparation of the tibia. A trial femoral component was then impacted on to the distal end of the femur. Trial reduction was then performed with incremental size trial bearing surfaces.  The Attune RP CR 7mm bearing surface matching the femur was inserted and allowed for full extension, good medial, lateral stability at 90 degrees of flexion, especially medially. Our attention was then turned to the patella. The patella was sized to a 35mm oval DePuy patella. The guide was pinned, placed over patella, 3 holes were drilled. Trial patella button was inserted and the patella was reduced in the knee. The patella tracked normally using no-touch technique. The trial components were then all removed. The real components were opened on the back. The cut surfaces of the bone were prepared using the PulsaVac lavage. Prior to this, the Aquamantys was used to cauterize any soft tissue bleeding. 20mg of Depuy #2 cement cement was mixed. The femoral and tibial components were impacted in place and patellar component was cemented into place. Excess cement was removed from around the edge of bone using plastic curette. Once the cement had hardened, the knee was placed through range of motion and noted to be stable as mentioned above with the trail components. The wound was dry, therefore no drain was used. The operative knee was injected with 20 cc of exparel. The knee was then soaked with a diluted betadine solution for approximately 3 min. This was then thoroughly irrigated. The capsular layer was closed using a #2 stratafix suture with the knee flexed 90 degrees, while subcutaneous layers were closed using 2-0 Vicryl suture. Finally the skin was closed using Prineo, which were applied in occlusive fashion and sterile bandage applied. An Iceman cryotherapy pad was applied on the operative leg. Sponge count and needle counts were correct. She was taken to the recovery room extubated in stable condition.     Signed By: Jonas Ibarra MD     March 19, 2018

## 2018-03-19 NOTE — INTERVAL H&P NOTE
H&P Update:  Marie Andrew was seen and examined. History and physical has been reviewed. The patient has been examined.  There have been no significant clinical changes since the completion of the originally dated History and Physical.    Signed By: Eloina Meyer MD     March 19, 2018 9:39 AM

## 2018-03-19 NOTE — ANESTHESIA PREPROCEDURE EVALUATION
Anesthetic History   No history of anesthetic complications            Review of Systems / Medical History  Patient summary reviewed, nursing notes reviewed and pertinent labs reviewed    Pulmonary  Within defined limits                 Neuro/Psych   Within defined limits           Cardiovascular    Hypertension              Exercise tolerance: >4 METS     GI/Hepatic/Renal  Within defined limits              Endo/Other      Hypothyroidism  Morbid obesity and arthritis     Other Findings              Physical Exam    Airway  Mallampati: III  TM Distance: 4 - 6 cm  Neck ROM: decreased range of motion   Mouth opening: Normal     Cardiovascular  Regular rate and rhythm,  S1 and S2 normal,  no murmur, click, rub, or gallop  Rhythm: regular  Rate: normal         Dental    Dentition: Caps/crowns     Pulmonary  Breath sounds clear to auscultation               Abdominal  GI exam deferred       Other Findings            Anesthetic Plan    ASA: 2  Anesthesia type: general      Post-op pain plan if not by surgeon: peripheral nerve block single    Induction: Intravenous  Anesthetic plan and risks discussed with: Patient      Procedure and alternatives explained. Risks explained including bleeding, infection, nerve injury, failed block. Procedure explained as elective. Pt understands and desires to proceed.

## 2018-03-19 NOTE — DISCHARGE SUMMARY
[address] [address]     DISCHARGE SUMMARY     PATIENT: Markie Bustamante     MRN: [id number]   ADMIT DATE: 3/19/2018   [facility]   DISCHARGE DATE:      ATTENDING: Yon Mascorro MD   DICTATING: CHRISTIAN Torre         ADMISSION DIAGNOSIS: RIGHT KNEE OSTEOARTHRITIS  Osteoarthritis of right knee    DISCHARGE DIAGNOSIS: Status post RIGHT TOTAL KNEE ARTHROPLASTY    HISTORY OF PRESENT ILLNESS: The patient is a 72y.o. year-old female   with ongoing right knee pain secondary to osteoarthritis of her right knee. The patient's pain has persisted and progressed despite conservative treatments and therapies. The patient has at this time opted for surgical intervention. PAST MEDICAL HISTORY:   Past Medical History:   Diagnosis Date    Arthritis     knee    Hypertension     20 years    Ill-defined condition age 31    Sarcoidosis    Menopause     Osteoarthritis of right knee 3/14/2018    Thyroid disease     6 years       PAST SURGICAL HISTORY:   Past Surgical History:   Procedure Laterality Date    CHEST SURGERY PROCEDURE UNLISTED  27 y ago    lung biopsy for sarcoisosis    HX GYN  36 y ago    tubal pregnancy    HX HYSTERECTOMY  36 y ago       ALLERGIES:   Allergies   Allergen Reactions    Pcn [Penicillins] Rash        CURRENT MEDICATIONS:  A list of medications prior to the time of admission include:  Prior to Admission medications    Medication Sig Start Date End Date Taking? Authorizing Provider   aspirin delayed-release 81 mg tablet Take 1 Tab by mouth two (2) times a day for 21 days. 3/19/18 4/9/18 Yes CHRISTIAN Thurston   oxyCODONE-acetaminophen (PERCOCET) 5-325 mg per tablet Take 1-2 tablets by mouth every 4-6 hours as needed for pain. 3/19/18  Yes CHRISTIAN Thurston   potassium chloride SR (KLOR-CON 10) 10 mEq tablet Take 20 mEq by mouth.    Yes Historical Provider   levothyroxine (SYNTHROID) 75 mcg tablet Take  by mouth daily. Yes Historical Provider   hydroCHLOROthiazide (HYDRODIURIL) 12.5 mg tablet Take 12.5 mg by mouth daily. Indications: hypertension   Yes Historical Provider   valsartan (DIOVAN) 320 mg tablet Take 320 mg by mouth daily. Yes Historical Provider   calcium carbonate (OS-ALAYNA) 500 mg calcium (1,250 mg) tablet Take  by mouth daily. Historical Provider       FAMILY HISTORY: History reviewed. No pertinent family history. SOCIAL HISTORY:   Social History     Social History    Marital status:      Spouse name: N/A    Number of children: N/A    Years of education: N/A     Social History Main Topics    Smoking status: Never Smoker    Smokeless tobacco: Never Used    Alcohol use No    Drug use: No    Sexual activity: Not Asked     Other Topics Concern    None     Social History Narrative       REVIEW OF SYSTEMS: All review of systems are negative. PHYSICAL EXAMINATION: For a detailed physical exam, please refer to the patient's chart. HOSPITAL COURSE: The patient was taken to surgery the day of admission. she underwent a right total knee replacement. Operative course was benign. Estimated blood loss was approximately 150 cc. The patient was taken to the PACU in stable condition and was later taken to the floor in stable condition. During her hospital stay, the patient progressed well with physical therapy and occupational therapy, adherent to instructions. she had been cleared by physical therapy with stair training. she was placed on Aspirin for DVT prophylaxis. her pain has been well controlled with oral pain medications. her vitals have remained stable. she has also remained hemodynamically stable. The patient has been recommended for discharge home. DISCHARGE INSTRUCTIONS: The patient is to be discharged home. she is to continue on her prior medications per the medication reconciliation form, to which we will add:  1. Aspirin 81 mg; 1 tablet po bid x 21 days  2.  Percocet 5/325 mg; 1-2 tablets p.o. every 4 to 6 hours p.r.n. for pain    The patient is to continue at home with home physical therapy 3 times a week to work on gait training, range of motion, strengthening, and weightbearing exercises as tolerated on her right lower extremity. The patient is to progress from a walker to a cane to complete total weightbearing as tolerable. The patient is to continue to keep her incision dry. The patient is to followup with Dr. Jennifer Lawton, Nuris Lockhart PA-C and/or [facility] in the office approximately 10-14 days status post for x-rays and further evaluation.     CHRISTIAN Lr  3/19/2018

## 2018-03-19 NOTE — PERIOP NOTES
TRANSFER - IN REPORT:    Verbal report received from OR RN, and CRNA on Nj Bread  being received from OR for routine progression of care      Report consisted of patients Situation, Background, Assessment and   Recommendations(SBAR). Information from the following report(s) SBAR, Kardex, OR Summary, Procedure Summary, Intake/Output and MAR was reviewed with the receiving nurse. Opportunity for questions and clarification was provided. Assessment completed upon patients arrival to unit and care assumed.

## 2018-03-19 NOTE — PERIOP NOTES
Pt up to bathroom to void with assistance.    Dual skin assessment completed and verified with Cassandra CALDWELL

## 2018-03-19 NOTE — ANESTHESIA PROCEDURE NOTES
Peripheral Block    Start time: 3/19/2018 9:27 AM  End time: 3/19/2018 9:31 AM  Performed by: Florin Lawson by: Viviana Mack       Pre-procedure: Indications: at surgeon's request and post-op pain management    Preanesthetic Checklist: patient identified, risks and benefits discussed, site marked, timeout performed, anesthesia consent given and patient being monitored    Timeout Time: 09:27          Block Type:   Block Type: Adductor canal  Laterality:  Right  Monitoring:  Standard ASA monitoring, responsive to questions, continuous pulse ox, oxygen, frequent vital sign checks and heart rate  Injection Technique:  Single shot  Procedures: ultrasound guided    Patient Position: supine  Prep: chlorhexidine    Location:  Mid thigh  Needle Type:  Stimuplex  Needle Gauge:  21 G  Needle Localization:  Ultrasound guidance  Medication Injected:  0.5%  ropivacaine  Volume (mL):  20  Add'l Medication Injected:  1.5%  mepivacaine  Volume (mL):  10    Assessment:  Number of attempts:  1  Injection Assessment:  Incremental injection every 5 mL, negative aspiration for CSF, no paresthesia, ultrasound image on chart, local visualized surrounding nerve on ultrasound, negative aspiration for blood and no intravascular symptoms  Patient tolerance:  Patient tolerated the procedure well with no immediate complications  Patient able to straight leg raise  right leg after block. No sensory or motor block.

## 2018-03-19 NOTE — IP AVS SNAPSHOT
[address] [address] 
[phone] Patient: Diane Ko MRN: [id number] :1945 About your hospitalization You were admitted on:  2018 You last received care in the:  THE [facility] [address] You were discharged on:  2018 Why you were hospitalized Your primary diagnosis was:  Osteoarthritis Of Right Knee Follow-up Information Follow up With Details Comments Contact Info Doreen Wilson MD On 2018 Follow up appointment @ 1:20pm [address] [place] [address] [address] 1700 [facility] 
[phone] Salma Medley MD   218 [facility] [address] [place] [address] 
[phone] Discharge Orders None A check delta indicates which time of day the medication should be taken. My Medications START taking these medications Instructions Each Dose to Equal  
 Morning Noon Evening Bedtime  
 aspirin delayed-release 81 mg tablet Your last dose was: Your next dose is: Take 1 Tab by mouth two (2) times a day for 21 days. 81 mg  
    
   
   
   
  
 oxyCODONE-acetaminophen 5-325 mg per tablet Commonly known as:  PERCOCET Your last dose was: Your next dose is: Take 1-2 tablets by mouth every 4-6 hours as needed for pain. CONTINUE taking these medications Instructions Each Dose to Equal  
 Morning Noon Evening Bedtime  
 calcium carbonate 500 mg calcium (1,250 mg) tablet Commonly known as:  OS-ALAYNA Your last dose was: Your next dose is: Take  by mouth daily. DIOVAN 320 mg tablet Generic drug:  valsartan Your last dose was: Your next dose is: Take 320 mg by mouth daily. 320 mg  
    
   
   
   
  
 hydroCHLOROthiazide 12.5 mg tablet Commonly known as:  HYDRODIURIL  
 Your last dose was: Your next dose is: Take 12.5 mg by mouth daily. Indications: hypertension 12.5 mg  
    
   
   
   
  
 potassium chloride SR 10 mEq tablet Commonly known as:  KLOR-CON 10 Your last dose was: Your next dose is: Take 20 mEq by mouth. 20 mEq SYNTHROID 75 mcg tablet Generic drug:  levothyroxine Your last dose was: Your next dose is: Take  by mouth daily. Where to Get Your Medications Information on where to get these meds will be given to you by the nurse or doctor. ! Ask your nurse or doctor about these medications  
  aspirin delayed-release 81 mg tablet  
 oxyCODONE-acetaminophen 5-325 mg per tablet Discharge Instructions [address] [place] [address] Patient Discharge Instructions [facility] / [id number] : 1945 Admitted 3/19/2018 Discharged: 3/19/2018 IF YOU HAVE ANY PROBLEMS ONCE YOU ARE AT HOME CALL THE FOLLOWING NUMBERS:  
Main office number: [phone] Your follow up appointment to see either Dr. Agustina Geiger PA-C, or [place] JAGDISH as scheduled in 2 weeks. If you are unsure of your appointment date call the office at [phone]. Medication Instructions · Resume your home medictions as directed, you may have directed not to resume supplements until after your follow up. · A prescription for pain medication has been given · It is important that you take the medication exactly as they are prescribed. · Keep your medication in the bottles provided by the pharmacist and keep a list of the medication names, dosages, and times to be taken in your wallet. · Do not take other medications without consulting your doctor. What to do at [facility] Resume your prehospital diet. If you have excessive nausea or vomitting call your doctor's office. Be sure to maintain adequate fluid intake. Some pain medications may cause constipation. Remember to drink fluids, stay as active as possible, and eat plenty of fiber-rich foods. Begin In-Home Physical Therapy; 3 times a week to work on gait training, range of motion, strengthening, and weight bearing exercises as tolerable. Continue to use your walker or cane when walking. May progress from the walker to a cane to complete total bearing as tolerable. Patient may shower. Wrap incision with plastic wrap/covering to prevent incision from getting wet. Avoid complete immersion. YOUR DRESSING SHOULD BE CHANGED IN 3-5 DAYS BY [facility] NURSE. When to Call - Call if you have a temperature greater then 101 
- Unable to keep food down - Are unable to bear any wieght  
- Need a pain medication refill Information obtained by : 
I understand that if any problems occur once I am at home I am to contact my physician. I understand and acknowledge receipt of the instructions indicated above. Physician's or R.N.'s Signature                                                                  Date/Time Patient or Representative Signature                                                          Date/Time [employer] Announcement We are excited to announce that we are making your provider's discharge notes available to you in [employer]. You will see these notes when they are completed and signed by the physician that discharged you from your recent hospital stay.   If you have any questions or concerns about any information you see in [employer], please call the [facility] where you were seen or reach out to your Primary Care Provider for more information about your plan of care. Introducing \[facility] [place] [facility]\"" & HEALTH SERVICES! Dear Monico Cai: 
Thank you for requesting a [employer] account. Our records indicate that you already have an active [employer] account. You can access your account anytime at [email or web address] Did you know that you can access your hospital and ER discharge instructions at any time in [employer]? You can also review all of your test results from your hospital stay or ER visit. Additional Information If you have questions, please visit the Frequently Asked Questions section of the [employer] website at [email or web address] Remember, [employer] is NOT to be used for urgent needs. For medical emergencies, dial 911. Now available from your iPhone and Android! Providers Seen During Your Hospitalization Provider Specialty Primary office phone Doreen Wilson MD Orthopedic Surgery [phone] Your Primary Care Physician (PCP) Primary Care Physician Office Phone Office Fax Romero Acuna [phone] You are allergic to the following Allergen Reactions Pcn (Penicillins) Rash Recent Documentation Height Weight BMI OB Status Smoking Status 1.575 m 92 kg 37.09 kg/m2 Hysterectomy Never Smoker Emergency Contacts Name Discharge Info Relation Home Work Mobile [address] [facility] CAREGIVER [3] Spouse [[phone] Patient Belongings The following personal items are in your possession at time of discharge: 
  Dental Appliances: None  Visual Aid: Glasses      Home Medications: None   Jewelry: None  Clothing: Pants, Undergarments, Footwear, Shirt, Sent home, Socks (GIven to Jazzy Moore )    Other Valuables: Eyeglasses, Purse (Given to Jazzy Moore ) Please provide this summary of care documentation to your next provider. Signatures-by signing, you are acknowledging that this After Visit Summary has been reviewed with you and you have received a copy. Patient Signature:  ____________________________________________________________ Date:  ____________________________________________________________  
  
Neida Sleeper Provider Signature:  ____________________________________________________________ Date:  ____________________________________________________________

## 2018-03-19 NOTE — PROGRESS NOTES
Problem: Mobility Impaired (Adult and Pediatric)  Goal: *Acute Goals and Plan of Care (Insert Text)  In 1-7 days pt will be able to perform:  ST.  Bed mobility:  Rolling L to R to L modified independent for positioning. 2.  Supine to sit to supine S with HR for meals. 3.  Sit to stand to sit S with RW in prep for ambulation. LT.  Gait:  Ambulate >150ft S with RW, WBAT, for home/community mobility. 2.  Stair Negotiation:  Ascend/descend >3 steps CGA with HR for home entry. 3.  Activity tolerance: Tolerate up in chair 1-2 hours for ADLs. 4.  Patient/Family Education:  Patient/family to be independent with HEP for follow-up care and safe discharge. physical Therapy EVALUATION    Patient: Juana Best (86 y.o. female)  Date: 3/19/2018  Primary Diagnosis: RIGHT KNEE OSTEOARTHRITIS  Osteoarthritis of right knee  Procedure(s) (LRB):  RIGHT TOTAL KNEE REPLACEMENT (Right) Day of Surgery   Precautions:   Fall, WBAT    ASSESSMENT :  Based on the objective data described below, the patient presents with decreased functional mobility and independence in regard to bed mobility, transfers, gt quality and tolerance, R knee AROM, R knee strength, pain, stair negotiation and safety due to recent R TKA surgery. Pt rating pain in R knee 6/10 on numerical pain scale. Pt required CGA/min A for supine to sit to stand. Pt able to participate in gt training w/ RW, WBAT, GB and CGA in hallway w/ antalgic pattern. Pt returned to sitting EOB to eat meal w/ all needs within reach and ice pack to R knee. Nurse Demetria Victor aware and visitors present. Recommend [facility] d/c. Pt will need RW. Patient will benefit from skilled intervention to address the above impairments.   Patients rehabilitation potential is considered to be Good  Factors which may influence rehabilitation potential include:   []         None noted  []         Mental ability/status  []         Medical condition  []         Home/family situation and support systems  []         Safety awareness  [x]         Pain tolerance/management  []         Other:      PLAN :  Recommendations and Planned Interventions:  [x]           Bed Mobility Training             []    Neuromuscular Re-Education  [x]           Transfer Training                   []    Orthotic/Prosthetic Training  [x]           Gait Training                          []    Modalities  [x]           Therapeutic Exercises          []    Edema Management/Control  [x]           Therapeutic Activities            [x]    Patient and Family Training/Education  []           Other (comment):    Frequency/Duration: Patient will be followed by physical therapy twice daily to address goals. Discharge Recommendations: Home Health  Further Equipment Recommendations for Discharge: rolling walker     SUBJECTIVE:   Patient stated I feel a little dizzy.     OBJECTIVE DATA SUMMARY:     Past Medical History:   Diagnosis Date    Arthritis     knee    Hypertension     20 years    Ill-defined condition age 31    Sarcoidosis    Menopause     Osteoarthritis of right knee 3/14/2018    Thyroid disease     6 years     Past Surgical History:   Procedure Laterality Date    CHEST SURGERY PROCEDURE UNLISTED  27 y ago    lung biopsy for sarcoisosis    HX GYN  36 y ago    tubal pregnancy    HX HYSTERECTOMY  40 y ago     Barriers to Learning/Limitations: None  Compensate with: visual, verbal, tactile, kinesthetic cues/model  Prior Level of Function/Home Situation:   Home Situation  Home Environment: Private residence  # Steps to Enter: 4  Rails to Enter: Yes  Hand Rails : Bilateral  One/Two Story Residence: Two story, live on 1st floor  # of Interior Steps: 13  Interior Rails: Left  Lift Chair Available: No  Living Alone: No  Support Systems: Family member(s)  Patient Expects to be Discharged to[de-identified] Private residence  Current DME Used/Available at Home: None  Critical Behavior:  Neurologic State: Alert; Appropriate for age  Orientation Level: Oriented X4  Cognition: Appropriate decision making; Appropriate for age attention/concentration; Appropriate safety awareness; Follows commands  Safety/Judgement: Awareness of environment  Psychosocial  Patient Behaviors: Calm; Cooperative  Family  Behaviors: Supportive;Calm  Skin Condition/Temp: Dry;Warm  Family  Behaviors: Supportive;Calm  Skin Integrity: Incision (comment) (R hip)  Skin Integumentary  Skin Color: Appropriate for ethnicity  Skin Condition/Temp: Dry;Warm  Skin Integrity: Incision (comment) (R hip)  Turgor: Non-tenting  Strength:    Strength: Generally decreased, functional  Tone & Sensation:   Tone: Normal  Sensation: Intact  Range Of Motion:  AROM: Generally decreased, functional  Functional Mobility:  Bed Mobility:  Supine to Sit: Contact guard assistance (vc)  Scooting: Contact guard assistance (vc)  Transfers:  Sit to Stand: Minimum assistance (vc)  Stand to Sit: Minimum assistance (vc)  Balance:   Sitting: Intact  Standing: Intact; With support  Ambulation/Gait Training:  Distance (ft): 34 Feet (ft)  Assistive Device: Walker, rolling;Gait belt  Ambulation - Level of Assistance: Contact guard assistance (vc)  Gait Abnormalities: Step to gait; Antalgic (vc)  Right Side Weight Bearing: As tolerated  Base of Support: Shift to left  Stance: Right decreased  Speed/Shana: Slow  Step Length: Left shortened;Right shortened  Swing Pattern: Left asymmetrical;Right asymmetrical  Interventions: Safety awareness training; Tactile cues; Verbal cues  Therapeutic Exercises:   HEP written copy issued to pt per MD protocol. Pain:  Pain Scale 1: Numeric (0 - 10)  Pain Intensity 1: 3  Pain Location 1: Knee  Pain Orientation 1: Right  Pain Description 1: Aching  Pain Intervention(s) 1: Medication (see MAR)  Activity Tolerance:   Fair   Please refer to the flowsheet for vital signs taken during this treatment.   After treatment:   [x]         Patient left in no apparent distress sitting up EOB  [] Patient left in no apparent distress in bed  [x]         Call bell left within reach  [x]         Nursing notified  [x]         Visitor present  []         Bed alarm activated    COMMUNICATION/EDUCATION:   [x]         Fall prevention education was provided and the patient/caregiver indicated understanding. [x]         Patient/family have participated as able in goal setting and plan of care. [x]         Patient/family agree to work toward stated goals and plan of care. []         Patient understands intent and goals of therapy, but is neutral about his/her participation. []         Patient is unable to participate in goal setting and plan of care. Thank you for this referral.  Ger Elena, PT   Time Calculation: 24 mins    Eval Complexity: History: HIGH Complexity :3+ comorbidities / personal factors will impact the outcome/ POC Exam:MEDIUM Complexity : 3 Standardized tests and measures addressing body structure, function, activity limitation and / or participation in recreation  Presentation: MEDIUM Complexity : Evolving with changing characteristics  Clinical Decision Making:Low Complexity amb >30' Overall Complexity:LOW      Mobility  Current  CJ= 20-39%   Goal  CI= 1-19%. The severity rating is based on the Level of Assistance required for Functional Mobility and ADLs.

## 2018-03-19 NOTE — PROGRESS NOTES
1306: Skin assessment with Princess Bui RN. Skin intact, no pressure ulcers notes. Assessment completed. Patient is A&OX4. Patient is calm and cooperative, appears very drowsy. Family at bedside. Patient PERRLA, oral mucosa pink and moist, strong  on both upper extremities. Lung sound clear, not appear distress. Bowel sounds active. Pedal pulses are palpable, no complain of any numbness or tingling,Complained calf pain, but stated that feel like muscle stretching, \" not sharp\", will continue to monitor pt, no tenderness or redness noted. IV site dry and dressing intact. Elastic dressing to right knee is clean and dry, Ice is applied. SCD and Silvio hose are applied. Pain is 4/10.  bed is in lower position, call bell and personal items are within reach. Pain regimen and activities are educated, educated pt to call when getting up to prevent fall. Pt verbalized understanding. Pt stated that she had hx of constipation, educated to drink plenty of water, eat fiber and ambulation with help with the BM. 1430: Pt stated that Pain is 6/10. PRN oxycodone is given per [facility]. Side effects is educated and will continue to monitor the effectiveness. Pt stated that she had less pain in the calf. 1419: Pt stated that Pain is 5/10. Appears very drowsy. PRN oxycodone is given per [facility]. Side effects is educated and will continue to monitor the effectiveness. 1628: Pt is currently resting and watching TV in the room. Denied nausea. 1845: Pt stated that Pain is 8/10. PRN oxycodone is given per [facility]. Side effects is educated and will continue to monitor the effectiveness.

## 2018-03-20 VITALS
BODY MASS INDEX: 37.32 KG/M2 | TEMPERATURE: 98.2 F | DIASTOLIC BLOOD PRESSURE: 75 MMHG | SYSTOLIC BLOOD PRESSURE: 127 MMHG | OXYGEN SATURATION: 100 % | RESPIRATION RATE: 18 BRPM | HEART RATE: 75 BPM | WEIGHT: 202.8 LBS | HEIGHT: 62 IN

## 2018-03-20 LAB
ANION GAP SERPL CALC-SCNC: 9 MMOL/L (ref 3–18)
BUN SERPL-MCNC: 15 MG/DL (ref 7–18)
BUN/CREAT SERPL: 16 (ref 12–20)
CALCIUM SERPL-MCNC: 8.4 MG/DL (ref 8.5–10.1)
CHLORIDE SERPL-SCNC: 107 MMOL/L (ref 100–108)
CO2 SERPL-SCNC: 26 MMOL/L (ref 21–32)
CREAT SERPL-MCNC: 0.92 MG/DL (ref 0.6–1.3)
ERYTHROCYTE [DISTWIDTH] IN BLOOD BY AUTOMATED COUNT: 15.5 % (ref 11.6–14.5)
GLUCOSE SERPL-MCNC: 110 MG/DL (ref 74–99)
HCT VFR BLD AUTO: 34.6 % (ref 35–45)
HGB BLD-MCNC: 10.8 G/DL (ref 12–16)
MCH RBC QN AUTO: 25.9 PG (ref 24–34)
MCHC RBC AUTO-ENTMCNC: 31.2 G/DL (ref 31–37)
MCV RBC AUTO: 83 FL (ref 74–97)
PLATELET # BLD AUTO: 101 K/UL (ref 135–420)
POTASSIUM SERPL-SCNC: 4 MMOL/L (ref 3.5–5.5)
RBC # BLD AUTO: 4.17 M/UL (ref 4.2–5.3)
SODIUM SERPL-SCNC: 142 MMOL/L (ref 136–145)
WBC # BLD AUTO: 11.8 K/UL (ref 4.6–13.2)

## 2018-03-20 PROCEDURE — 97167 OT EVAL HIGH COMPLEX 60 MIN: CPT

## 2018-03-20 PROCEDURE — 77030027138 HC INCENT SPIROMETER -A

## 2018-03-20 PROCEDURE — 74011250636 HC RX REV CODE- 250/636: Performed by: PHYSICIAN ASSISTANT

## 2018-03-20 PROCEDURE — G8988 SELF CARE GOAL STATUS: HCPCS

## 2018-03-20 PROCEDURE — 36415 COLL VENOUS BLD VENIPUNCTURE: CPT | Performed by: PHYSICIAN ASSISTANT

## 2018-03-20 PROCEDURE — G8989 SELF CARE D/C STATUS: HCPCS

## 2018-03-20 PROCEDURE — 97535 SELF CARE MNGMENT TRAINING: CPT

## 2018-03-20 PROCEDURE — 85027 COMPLETE CBC AUTOMATED: CPT | Performed by: PHYSICIAN ASSISTANT

## 2018-03-20 PROCEDURE — G8987 SELF CARE CURRENT STATUS: HCPCS

## 2018-03-20 PROCEDURE — 80048 BASIC METABOLIC PNL TOTAL CA: CPT | Performed by: PHYSICIAN ASSISTANT

## 2018-03-20 PROCEDURE — 74011250637 HC RX REV CODE- 250/637: Performed by: PHYSICIAN ASSISTANT

## 2018-03-20 PROCEDURE — 97110 THERAPEUTIC EXERCISES: CPT

## 2018-03-20 PROCEDURE — 97116 GAIT TRAINING THERAPY: CPT

## 2018-03-20 PROCEDURE — 97530 THERAPEUTIC ACTIVITIES: CPT

## 2018-03-20 RX ADMIN — OXYCODONE HYDROCHLORIDE 10 MG: 5 TABLET ORAL at 08:03

## 2018-03-20 RX ADMIN — LEVOTHYROXINE SODIUM 75 MCG: 75 TABLET ORAL at 09:56

## 2018-03-20 RX ADMIN — POTASSIUM CHLORIDE 20 MEQ: 20 TABLET, EXTENDED RELEASE ORAL at 09:38

## 2018-03-20 RX ADMIN — OXYCODONE HYDROCHLORIDE 10 MG: 5 TABLET ORAL at 07:54

## 2018-03-20 RX ADMIN — KETOROLAC TROMETHAMINE 15 MG: 15 INJECTION, SOLUTION INTRAMUSCULAR; INTRAVENOUS at 00:47

## 2018-03-20 RX ADMIN — KETOROLAC TROMETHAMINE 15 MG: 15 INJECTION, SOLUTION INTRAMUSCULAR; INTRAVENOUS at 06:40

## 2018-03-20 RX ADMIN — FERROUS SULFATE TAB 325 MG (65 MG ELEMENTAL FE) 325 MG: 325 (65 FE) TAB at 09:38

## 2018-03-20 RX ADMIN — HYDROCHLOROTHIAZIDE 12.5 MG: 25 TABLET ORAL at 09:56

## 2018-03-20 RX ADMIN — CALCIUM 500 MG: 500 TABLET ORAL at 09:38

## 2018-03-20 RX ADMIN — DOCUSATE SODIUM 100 MG: 100 CAPSULE, LIQUID FILLED ORAL at 09:38

## 2018-03-20 RX ADMIN — VANCOMYCIN HYDROCHLORIDE 1500 MG: 10 INJECTION, POWDER, LYOPHILIZED, FOR SOLUTION INTRAVENOUS at 09:32

## 2018-03-20 RX ADMIN — PANTOPRAZOLE SODIUM 40 MG: 40 TABLET, DELAYED RELEASE ORAL at 09:39

## 2018-03-20 RX ADMIN — ASPIRIN 81 MG: 81 TABLET, COATED ORAL at 09:38

## 2018-03-20 RX ADMIN — VALSARTAN 320 MG: 160 TABLET ORAL at 09:39

## 2018-03-20 NOTE — PROGRESS NOTES
Progress Note        Patient: Rolando Marquez MRN: [id number]  SSN: [id number]    YOB: 1945  Age: 72 y.o. Sex: female      1 Day Post-Op status post Procedure(s) (LRB):  RIGHT TOTAL KNEE REPLACEMENT (Right)    Admit Date: 3/19/2018  Admit Diagnosis: RIGHT KNEE OSTEOARTHRITIS  Osteoarthritis of right knee    Subjective:      Doing well. No complaints. No SOB. No Chest Pain. No Nausea or Vomiting. No problems eating or voiding. Objective:        Temp (24hrs), Av.8 °F (36.6 °C), Min:97.3 °F (36.3 °C), Max:98.6 °F (37 °C)    Body mass index is 37.09 kg/(m^2). Patient Vitals for the past 12 hrs:   BP Temp Pulse Resp SpO2   18 0711 122/64 97.6 °F (36.4 °C) 62 17 97 %   18 0408 112/57 97.4 °F (36.3 °C) 64 16 97 %   18 0016 104/52 97.6 °F (36.4 °C) 71 16 96 %   18 1954 133/64 97.5 °F (36.4 °C) 82 16 94 %     Recent Labs      18   0507   HGB  10.8*   HCT  34.6*   NA  142   K  4.0   CL  107   CO2  26   BUN  15   CREA  0.92   GLU  110*       Physical Exam:  Vital Signs are Stable. No Acute Distress. Alert and Oriented. Negative Homans sign. Toes AROM Full. Neurovascular exam is normal.    Dressing is Clean, Dry, and Intact. Assessment/Plan:     1. Continue oob/rehab  2.  D/c planning    Continue PT/OT  Continue CPM/ROM    Discharge Plan: Home    Signed By: Lester Gurrola MD     2018

## 2018-03-20 NOTE — PROGRESS NOTES
Problem: Falls - Risk of  Goal: *Absence of Falls  Document Ivone Fall Risk and appropriate interventions in the flowsheet.    Outcome: Progressing Towards Goal  Fall Risk Interventions:  Mobility Interventions: Communicate number of staff needed for ambulation/transfer, Patient to call before getting OOB, Utilize walker, cane, or other assitive device    Mentation Interventions: Adequate sleep, hydration, pain control    Medication Interventions: Patient to call before getting OOB, Teach patient to arise slowly    Elimination Interventions: Call light in reach, Patient to call for help with toileting needs

## 2018-03-20 NOTE — PROGRESS NOTES
Problem: Self Care Deficits Care Plan (Adult)  Goal: *Acute Goals and Plan of Care (Insert Text)  Initial Occupational Therapy Goals (3/20/2018) Within 7 day(s):    1. Patient will perform grooming standing sinkside with S/mod I for increased independence in ADLs. 2. Patient will perform UB dressing with setup seated EOB for increased independence with ADLs. 3. Patient will perform LB dressing with setup/Afshan & A/E PRN for increased independence with ADLs. 4. Patient will perform all aspects of toileting with S/mod I for increased independence with ADLs. 5. Patient will perform LB ADLs utilizing body mechanics & adaptive strategies with 1 verbal cue for increased safety in ADLs. 6. Patient will independently apply energy conservation techniques with 1 verbal cue for increased independence with ADLs. Outcome: Progressing Towards Goal  Occupational Therapy EVALUATION    Patient: Sumi Leal (89 y.o. female)  Date: 3/20/2018  Primary Diagnosis: RIGHT KNEE OSTEOARTHRITIS  Osteoarthritis of right knee  Procedure(s) (LRB):  RIGHT TOTAL KNEE REPLACEMENT (Right) 1 Day Post-Op   Precautions:  Fall, WBAT (R TKA)    ASSESSMENT :  Based on the objective data described below, the patient presents with decreased functional strength, decreased functional balance, decreased overall activity tolerance limiting independence with ADLs. Patient somewhat confused as to why she needs to attempt dressing. Pt requiring constant cues for simple ADL tasks. Pt with poor use of RW, leaving behind and requiring cues for safety in use. Pt having difficulty with problem solving. Pt reports she has a RW that was her mother that is old and requested spouse bring it in to make sure proper height for pt. Pt would benefit from continued OT services to maximize independence and safety.     Education: Reviewed home safety, body mechanics, importance of moving every hour to prevent joint stiffness, role of ice for edema/pain control, Rolling Linda Kenney management/safety, and adaptive dressing techniques with patient verbalizing  understanding at this time     Patient will benefit from skilled intervention to address the above impairments. Patients rehabilitation potential is considered to be Good  Factors which may influence rehabilitation potential include:   []             None noted  [x]             Mental ability/status  [x]             Medical condition  [x]             Home/family situation and support systems  [x]             Safety awareness  [x]             Pain tolerance/management  []             Other:      PLAN :  Recommendations and Planned Interventions:  [x]               Self Care Training                  [x]        Therapeutic Activities  [x]               Functional Mobility Training    [x]        Cognitive Retraining  [x]               Therapeutic Exercises           [x]        Endurance Activities  [x]               Balance Training                   [x]        Neuromuscular Re-Education  []               Visual/Perceptual Training     [x]   Home Safety Training  [x]               Patient Education                 [x]        Family Training/Education  []               Other (comment):    Frequency/Duration: Patient will be followed by occupational therapy 1-2 times per day/3-5 days per week to address goals. Discharge Recommendations: Home Health  Further Equipment Recommendations for Discharge: N/A     SUBJECTIVE:   Patient stated Am I being discharged right now? I need to get dressed? Gretta Rao    OBJECTIVE DATA SUMMARY:     Past Medical History:   Diagnosis Date    Arthritis     knee    Hypertension     20 years    Ill-defined condition age 31    Sarcoidosis    Menopause     Osteoarthritis of right knee 3/14/2018    Thyroid disease     6 years     Past Surgical History:   Procedure Laterality Date    CHEST SURGERY PROCEDURE UNLISTED  27 y ago    lung biopsy for sarcoisosis    HX GYN  36 y ago    tubal pregnancy    HX HYSTERECTOMY  40 y ago     Barriers to Learning/Limitations: yes;  cognitive and physical  Compensate with: visual, verbal, tactile, kinesthetic cues/model    Prior Level of Function/Home Situation: supportive spouse  Home Situation  Home Environment: Private residence  # Steps to Enter: 4  Rails to Enter: Yes  Hand Rails : Bilateral  One/Two Story Residence: Two story, live on 1st floor  # of Interior Steps: 13  Interior Rails: Left  Lift Chair Available: No  Living Alone: No  Support Systems: Family member(s)  Patient Expects to be Discharged to[de-identified] Private residence  Current DME Used/Available at Home: Evelena Fragmin, rolling, Shower chair  Tub or Shower Type: Shower  [x]  Right hand dominant   []  Left hand dominant    Cognitive/Behavioral Status:  Neurologic State: Alert; Appropriate for age  Orientation Level: Appropriate for age;Oriented X4  Cognition: Appropriate decision making; Appropriate for age attention/concentration; Appropriate safety awareness  Safety/Judgement: Awareness of environment;Decreased awareness of need for assistance;Decreased awareness of need for safety;Decreased insight into deficits    Skin: R knee incision w/ Mepilex   Edema: R knee w/ ice filled and applied    Vision/Perceptual:      WFL     Coordination:  Coordination: Within functional limits  Fine Motor Skills-Upper: Left Intact; Right Intact    Gross Motor Skills-Upper: Left Intact; Right Intact    Balance:  Sitting: Intact  Standing: Intact; With support    Strength:  Strength: Generally decreased, functional  Tone & Sensation:  Tone: Normal  Sensation: Intact  Range of Motion:  AROM: Generally decreased, functional  PROM: Generally decreased, functional    Functional Mobility and Transfers for ADLs:  Bed Mobility:  Supine to Sit: Supervision  Sit to Supine: Supervision  Scooting: Supervision  Transfers:  Sit to Stand: Contact guard assistance   Toilet Transfer : Contact guard assistance   Bathroom Mobility: Contact guard assistance    ADL Assessment:   Feeding: Setup    Oral Facial Hygiene/Grooming: Contact guard assistance (vc)    Bathing: Contact guard assistance;Minimum assistance; Additional time    Upper Body Dressing: Contact guard assistance;Minimum assistance; Additional time    Lower Body Dressing: Minimum assistance; Additional time    Toileting: Contact guard assistance; Additional time; Adaptive equipment    ADL Intervention:  Grooming  Washing Hands: Contact guard assistance    Upper Body Dressing Assistance  Bra: Minimum assistance  Pullover Shirt: Supervision/set-up    Lower Body Dressing Assistance  Underpants: Minimum assistance  Pants With Elastic Waist: Contact guard assistance;Minimum assistance  Socks: Minimum assistance  Position Performed: Bending forward method;Seated in chair    Toileting  Toileting Assistance: Contact guard assistance;Supervision/set up  Bladder Hygiene: Contact guard assistance  Bowel Hygiene: Contact guard assistance  Clothing Management: Contact guard assistance  Adaptive Equipment: Grab bars; Walker    Cognitive Retraining  Problem Solving: Identifying the task; Identifying the problem;General alternative solution;Deductive reason  Executive Functions: Executing cognitive plans  Organizing/Sequencing: Breaking task down;Prioritizing  Attention to Task: Distractibility; Single task  Maintains Attention For (Time): 30 seconds  Following Commands: Awareness of environment; Follows one step commands/directions  Safety/Judgement: Awareness of environment;Decreased awareness of need for assistance;Decreased awareness of need for safety;Decreased insight into deficits  Cues: Tactile cues provided;Verbal cues provided;Visual cues provided    Pain:  Pre-treatment: 5/10  Post-treatment: 5/10    Activity Tolerance:   Patient able to stand <1 minute(s). Patient able to complete ADLs with frequent rest breaks. Patient limited by pain/cognition/strength/endurance/balance.  Patient unsteady     Please refer to the flowsheet for vital signs taken during this treatment. After treatment:   [x] Patient left in no apparent distress sitting up in chair  [] Patient left in no apparent distress in bed  [x] Call bell left within reach  [x] Nursing notified  [x] Caregiver present/spouse  [] Bed alarm activated    COMMUNICATION/EDUCATION:   [x] Home safety education was provided and the patient/caregiver indicated understanding. [x] Patient/family have participated as able in goal setting and plan of care. [x] Patient/family agree to work toward stated goals and plan of care. [] Patient understands intent and goals of therapy, but is neutral about his/her participation. [] Patient is unable to participate in goal setting and plan of care. Thank you for this referral.  Milla Pink, OTR/L  Time Calculation: 23 mins    G-Codes (GP)  Self Care   Current  CJ= 20-39%   Goal  CI= 1-19%  The severity rating is based on the professional judgement & direct observation of Level of Assistance required for Functional Mobility and ADLs. Eval Complexity: History: HIGH Complexity : Extensive review of history including physical, cognitive and psychosocial history ; Examination: HIGH Complexity : 5 or more performance deficits relating to physical, cognitive , or psychosocial skils that result in activity limitations and / or participation restrictions; Decision Making:HIGH Complexity : Patient presents with comorbidities that affect occupational performance.  Signifigant modification of tasks or assistance (eg, physical or verbal) with assessment (s) is necessary to enable patient to complete evaluation

## 2018-03-20 NOTE — DISCHARGE INSTRUCTIONS
[address] [place] [address] Sports Medicine   Patient Discharge Instructions    Ludin Rodríguez / [id number] : 1945    Admitted 3/19/2018 Discharged: 3/19/2018     IF YOU HAVE ANY PROBLEMS ONCE YOU ARE AT HOME CALL THE FOLLOWING NUMBERS:   Main office number: [phone]    Your follow up appointment to see either Dr. Sumeet Acuña [facility], or [facility] as scheduled in 2 weeks. If you are unsure of your appointment date call the office at [phone]. Medication Instructions     · Resume your home medictions as directed, you may have directed not to resume supplements until after your follow up. · A prescription for pain medication has been given   · It is important that you take the medication exactly as they are prescribed. · Keep your medication in the bottles provided by the pharmacist and keep a list of the medication names, dosages, and times to be taken in your wallet. · Do not take other medications without consulting your doctor. What to do at [address] [place] [address] Ave your prehospital diet. If you have excessive nausea or vomitting call your doctor's office. Be sure to maintain adequate fluid intake. Some pain medications may cause constipation. Remember to drink fluids, stay as active as possible, and eat plenty of fiber-rich foods. Begin In-Home Physical Therapy; 3 times a week to work on gait training, range of motion, strengthening, and weight bearing exercises as tolerable. Continue to use your walker or cane when walking. May progress from the walker to a cane to complete total bearing as tolerable. Patient may shower. Wrap incision with plastic wrap/covering to prevent incision from getting wet. Avoid complete immersion. YOUR DRESSING SHOULD BE CHANGED IN 3-5 DAYS BY [facility] NURSE.       When to Call    - Call if you have a temperature greater then 101  - Unable to keep food down  - Are unable to bear any wieght   - Need a pain medication refill     Information obtained by :  I understand that if any problems occur once I am at home I am to contact my physician. I understand and acknowledge receipt of the instructions indicated above.                                                                                                                                            Physician's or R.N.'s Signature                                                                  Date/Time                                                                                                                                              Patient or Representative Signature                                                          Date/Time

## 2018-03-20 NOTE — ROUTINE PROCESS
Bedside and Verbal shift change report given to JAVI Gambino by Lu Willoughby. Report included the following information SBAR, Kardex, OR Summary, Intake/Output and MAR.

## 2018-03-20 NOTE — PROGRESS NOTES
Chart reviewed, met with pt and spouse in room. Pt plans discharge home with spouse available to assist. FOC offered, pt chose [address] [facility] [phone] for follow up; referral placed with CMS. Pt has RW for home. Care Management Interventions  PCP Verified by CM:  Yes  Transition of Care Consult (CM Consult): [address]: No  Reason Outside [place]: Patient already serviced [facility]  Discharge Durable Medical Equipment: No  Physical Therapy Consult: Yes  Occupational Therapy Consult: Yes  Current Support Network: Lives with Spouse, Own Home  Confirm Follow Up Transport: Family  Plan discussed with Pt/Family/Caregiver: Yes  Freedom of Choice Offered: Yes  Discharge Location  Discharge Placement: Home with home health

## 2018-03-20 NOTE — PROGRESS NOTES
Problem: Mobility Impaired (Adult and Pediatric)  Goal: *Acute Goals and Plan of Care (Insert Text)  In 1-7 days pt will be able to perform:  ST.  Bed mobility:  Rolling L to R to L modified independent for positioning. 2.  Supine to sit to supine S with HR for meals. 3.  Sit to stand to sit S with RW in prep for ambulation. LT.  Gait:  Ambulate >150ft S with RW, WBAT, for home/community mobility. 2.  Stair Negotiation:  Ascend/descend >3 steps CGA with HR for home entry. 3.  Activity tolerance: Tolerate up in chair 1-2 hours for ADLs. 4.  Patient/Family Education:  Patient/family to be independent with HEP for follow-up care and safe discharge. Outcome: Resolved/Met Date Met: 18  physical Therapy TREATMENT/DISCHARGE    Patient: Yady Neves (74 y.o. female)  Date: 3/20/2018  Diagnosis: RIGHT KNEE OSTEOARTHRITIS  Osteoarthritis of right knee Osteoarthritis of right knee  Procedure(s) (LRB):  RIGHT TOTAL KNEE REPLACEMENT (Right) 1 Day Post-Op  Precautions: Fall, WBAT  Chart, physical therapy assessment, plan of care and goals were reviewed. ASSESSMENT:  Patient is cleared for discharge from PT and has met all goals. Patient reported 4-5/10 pain level on entry, nurse notified of patient request for pain medication, pt  present. Patient is S with bed mobility and at S level for sit<>supine and sit<>stand. PT ambulated 200' with RW and slow gait pattern progressing from step- to to step-through pattern with good balance . Up/down 4 steps with left rail and HHA on R with  demonstrating HHA assistance. Pt performed TKA ex as documented below and verbalized understanding of home safety information. AROM right knee 11 to 85 degrees. PT left up in chair, ice to right knee, needs in reach. Patient reported 4/10 pain at completion. Nurse Shira  aware of pt status. Recommend HHPT at discharge. Pt needs RW for safe home ambulation.     Progression toward goals:  [x]      Goals met  [] Improving appropriately and progressing toward goals  []      Improving slowly and progressing toward goals  []      Not making progress toward goals and plan of care will be adjusted     PLAN:  Patient will be discharged from physical therapy at this time. Rationale for discharge:  [x] Goals Achieved  [] [address]  [] Patient not participating in therapy  [] Other:  Discharge Recommendations:  Home Health  Further Equipment Recommendations for Discharge:  rolling walker and N/A     SUBJECTIVE:   Patient stated I am in pain but otherwise good.     OBJECTIVE DATA SUMMARY:   Critical Behavior:  Neurologic State: Alert, Appropriate for age  Orientation Level: Appropriate for age, Oriented X4  Cognition: Appropriate decision making, Appropriate for age attention/concentration, Appropriate safety awareness  Safety/Judgement: Awareness of environment  Functional Mobility Training:  Bed Mobility:  Supine to Sit: Supervision  Sit to Supine: Supervision  Scooting: Supervision  Transfers:  Sit to Stand: Supervision  Stand to Sit: Supervision  Balance:  Sitting: Intact  Standing: Intact; With support  Ambulation/Gait Training:  Distance (ft): 200 Feet (ft)  Assistive Device: Walker, rolling;Gait belt  Ambulation - Level of Assistance: Supervision  Gait Abnormalities: Antalgic;Decreased step clearance; Step to gait  Right Side Weight Bearing: As tolerated  Base of Support: Shift to left; Widened  Stance: Right decreased  Speed/Shana: Slow  Step Length: Right shortened;Left shortened  Swing Pattern: Right asymmetrical  Interventions: Verbal cues; Visual/Demos; Safety awareness training    Stairs:  Number of Stairs Trained: 4  Stairs - Level of Assistance: Contact guard assistance   Rail Use: Left  (HHA on R)  Therapeutic Exercises:   Patient performed 5 reps of TKA exercises per protocol for right lower extremity(s), including supine ankle pumps, ankle circles quad sets, hamstringsets, heel slides, straight leg raises, short arc quads/ terminal knee extension, seated heel slides/ knee flexion exercises, long arc quad exercise with S/cyes. Pain:  Pain Scale 1: Numeric (0 - 10)  Pain Intensity 1: 5  Activity Tolerance:   good  Please refer to the flowsheet for vital signs taken during this treatment. After treatment:   [x] Patient left in no apparent distress sitting up in chair  [] Patient left in no apparent distress in bed  [x] Call bell left within reach  [x] Nursing notified  [x] Caregiver present  [] Bed alarm activated  Mobility  D/C  CI= 1-19%. The severity rating is based on the Level of Assistance required for Functional Mobility and ADLs.       Adebayo Caruso, PT   Time Calculation: 40 mins

## 2018-03-20 NOTE — PROGRESS NOTES
Problem: Falls - Risk of  Goal: *Absence of Falls  Document Ivone Fall Risk and appropriate interventions in the flowsheet.    Outcome: Progressing Towards Goal  Fall Risk Interventions:  Mobility Interventions: Patient to call before getting OOB, PT Consult for mobility concerns, PT Consult for assist device competence, Strengthening exercises (ROM-active/passive), Utilize walker, cane, or other assitive device    Mentation Interventions: Adequate sleep, hydration, pain control, Increase mobility, Toileting rounds    Medication Interventions: Assess postural VS orthostatic hypotension, Patient to call before getting OOB, Teach patient to arise slowly    Elimination Interventions: Call light in reach, Patient to call for help with toileting needs

## 2018-03-20 NOTE — PROGRESS NOTES
1930 - Bedside report received from [facility]. Patient in bed. Pain 0/10.     2118 - Patient in bed at this time. IV to LFA  intact and patent. Plexis compression device bilaterally. TEDs to LLE. Dressing to R knee CDI. + motion and circulation, reports numbness. Pt A & O x 4. LS clear, on RA. Abdomen soft, NT and ND. + BS to all 4 quadrants. Denies nausea. Pain 0/10. Call light within reach. Medications given. Potential side effects explained to patient, patient verbalizes understanding, opportunities for questions provided. Patient stable, No apparent distress at this time, bed in locked position, call bell and phone within reach. 0430-Pt denies any needs. 0805-Medications given. Potential side effects explained to patient, patient verbalizes understanding, opportunities for questions provided. Patient stable, No apparent distress at this time, bed in locked position, call bell and phone within reach. First set of pills fell to the floor, pulled 2 more, waste witnessed by day RN. Pt had uneventful shift. Uses IS every hour while awake. Pt ambulated with assistance with a walker. Pain remained well-controlled with scheduled, medication, pt reluctant to take narcotics. No issues/concerns at this time.  Call bell within reach

## 2018-03-20 NOTE — PROGRESS NOTES
Patient was visited by [facility]. Volunteer conducted a Spiritual Care Screening and reported no needs to this . Spiritual Care literature was provided. Chaplains will continue to follow and will provide pastoral care as needed or requested. [address], M.Div.   Board Certified   [phone] - Office

## 2018-03-20 NOTE — PROGRESS NOTES
8046 - Patient in chair  at this time. A/O x 4. IV to right inner forearm  intact and patent. TEDS and plexis to bilateral lower elgs. Mepilex dressing to right knee CDI. Patient denies numbness/tingling. Pedal and radial pulses palpable. Lungs clear. Bowel sounds active to all quadrants. Patient able to get to 2000 on the incentive spirometer. Pain 2/10. Side effects of pain medication reviewed. Patient repeated back 5 examples of side effects from pain medication. 0940-Dressing changed per order, ACE removed and thalia hose applied to right leg.

## 2018-03-21 NOTE — PROGRESS NOTES
Problem: Falls - Risk of  Goal: *Absence of Falls  Document Ivone Fall Risk and appropriate interventions in the flowsheet.    Outcome: Progressing Towards Goal  Fall Risk Interventions:  Mobility Interventions: Utilize walker, cane, or other assitive device, Patient to call before getting OOB, PT Consult for mobility concerns, PT Consult for assist device competence, Strengthening exercises (ROM-active/passive)    Mentation Interventions: Adequate sleep, hydration, pain control    Medication Interventions: Teach patient to arise slowly, Patient to call before getting OOB    Elimination Interventions: Call light in reach, Elevated toilet seat, Patient to call for help with toileting needs, Toilet paper/wipes in reach, Toileting schedule/hourly rounds

## 2018-04-16 ENCOUNTER — APPOINTMENT (OUTPATIENT)
Dept: PHYSICAL THERAPY | Age: 73
End: 2018-04-16

## 2018-04-16 ENCOUNTER — HOSPITAL ENCOUNTER (OUTPATIENT)
Dept: PHYSICAL THERAPY | Age: 73
Discharge: HOME | End: 2018-04-16
Payer: MEDICARE

## 2018-04-16 PROCEDURE — 97162 PT EVAL MOD COMPLEX 30 MIN: CPT

## 2018-04-16 PROCEDURE — 97140 MANUAL THERAPY 1/> REGIONS: CPT

## 2018-04-16 PROCEDURE — G8978 MOBILITY CURRENT STATUS: HCPCS

## 2018-04-16 PROCEDURE — 97110 THERAPEUTIC EXERCISES: CPT

## 2018-04-16 PROCEDURE — G8979 MOBILITY GOAL STATUS: HCPCS

## 2018-04-16 NOTE — PROGRESS NOTES
PT DAILY TREATMENT NOTE    Patient Name: Kristin Chavez  Date:2018  : 1945  [x]  Patient  Verified  Payor: Mann Augustin / Plan: [place] MEDICARE PART A & B / Product Type: Medicare /    In time:1:15  Out time:2:15  Total Treatment Time (min): 60  Total Timed Codes (min): 50  1:1 Treatment Time ( [address] only): 50   Visit #: 1 of 12    Treatment Area: R knee    SUBJECTIVE  Pain Level (0-10 scale): 3  Medication Changes: [] No    [] Yes (see paper medication log)  Subjective functional status/changes:   [] No changes reported    See Plan of Care for subjective history    OBJECTIVE    Physical Therapy Evaluation - Knee    Posture: [] Varus    [] Valgus    [] Recurvatum        [] Tibial Torsion    [] Foot Supination    [] Foot Pronation    Describe: Neutral    Gait:  [x] Normal    [] Abnormal    [] Antalgic    [] NWB    Device:    Describe:    ROM / Strength  [] Unable to assess                  AROM                      PROM                   Strength (1-5)    Left Right Left Right Left Right   Hip Flexion          Extension          Abduction          Adduction         Knee Flexion          Extension         Ankle Plantarflexion          Dorsiflexion           Flexibility:  Other:    Palpation:   Neg/Pos  Neg/Pos  Neg/Pos   Joint Line  Quad tendon  Patellar ligament    Patella  Fibular head  Pes Anserinus    Tibial tubercle  Hamstring tendons  Infrapatellar fat pad      Optional Tests:  Patellar position/mobility:    Girth Measurements:     Cm at  Cm above joint line   Cm at   Cm below joint line  Cm at joint line   Left        Right           Lachmans  [] Neg    [] Pos Posterior Drawer [] Neg    [] Pos  Pivot Shift  [] Neg    [] Pos Posterior Sag  [] Neg    [] Pos  JOSE ALFREDO   [] Neg    [] Pos Julio's Test [] Neg    [] Pos  ALRI   [] Neg    [] Pos Squat   [] Neg    [] Pos  Valgus@ 0 Degrees [] Neg    [] Pos Jhonny [] Neg    [] Pos  Valgus@ 30 Degrees [] Neg    [] Pos Patellar Apprehension [] Neg    [] Pos  Varus@ 0 Degrees [] Neg    [] Pos Carrizales's Compression [] Neg    [] Pos  Varus@ 30 Degrees [] Neg    [] Pos Ely's Test  [] Neg    [] Pos  Apley's Compression [] Neg    [] Pos Arturo's Test  [] Neg    [] Pos  Apley's Distraction [] Neg    [] Pos Stroke Test  [] Neg    [] Pos   Anterior Drawer [] Neg    [] Pos Fluctuation Test [] Neg    [] Pos  Other:                  [] Neg    [] Pos                 OBJECTIVE TREATMENT  Modality (rationale): Decrease pain to improve knee ROM  []  E-Stim: type _ x _ min     []att   []unatt   []w/US   []w/ice   []w/heat  []  Traction: []cerv   []pelvic   _ lbs x _ min     []pro   []sup   []int   []const  []  Ultrasound: []cont   []pulse    _ W/cm2 x _  min   []1MHz   []3MHz  []  Iontophoresis: []take home patch w/ dexamethazone    []40mA   []80mA                               []_ mA min w/: []dexamethazone   []other:_  [x]  Ice pack 10_  min     [] Hot pack _  min     [] Paraffin _  min  []  Other:     Therapeutic Exercise: [x] see flow sheet     [] Other:_ (minutes) :10  Added/Changed Exercises:  [x]  Added:See HEP_  to improve (function):patient's ability to perform gait and other dynamic movement activities    Manual Therapy:   PROM knee ext/flex, HS stretching to increase knee mobility    (minutes) :10      Gait Training: [] _ feet w/ _ device on level surface with _ level of assist  []  Other:_       (minutes) :    Pain Level (0-10 scale) post treatment: 3    ASSESSMENT  [x]  See Plan of Care  Patient is assigned a medium evaluation complexity based upon presence of 3+ medical comorbidities, FOTO score, and post-operative/changing symptom characteristics.     PLAN  See [address] 4/16/2018  1:01 PM

## 2018-04-16 NOTE — PROGRESS NOTES
[address] [facility] AT [address], [address]  Phone: [phone]   [phone]  PLAN OF CARE / [address] [place] [address] PHYSICAL THERAPY SERVICES  Patient Name: Jose Cee : 1945   Medical   Diagnosis: Pain in right knee [M25.561] Treatment Diagnosis: Pain in right knee [M25.561]   Onset Date: 3/19/18     Referral Source: Leticia Hernandez MD [facility]): 2018   Prior Hospitalization: See medical history Provider #: [id number]   Prior Level of Function: Independent w/ ADL's   Comorbidities: Arthritis, HTN, BMI >30   Medications: Verified on Patient Summary List   The Plan of Care and following information is based on the information from the initial evaluation.   ===========================================================================================   Assessment / key information:  Patient is a 71 y/o female presenting s/p right TKA (DOS 3/19/18). Pt completed home health rehab and completed at the end of last week. Pt reports the knee is primarily stiff with bending>straightening and she reports moderate swelling. Pt uses a rolling walker primarily for ambulation, and reports no device used pre-surgically. Pt lives with her  in a 2 story home and report she can negotiate the stairs one at a time. Pt reports she has not walked further than 100-200 ft. Pain intensity ranging from 2-10/10, 3/10 currently. Patient presents with mod quad avoidance gait utilizing rolling walker. Surgical incision is healing well with moderate anterior knee edema present. Right knee ROM measured -4 degrees extension to 80/87 degrees flexion (0-125 left side). Quad and hamstring strength 4-/5. Pt also presents with bilateral distal LE TTP and fluid retention which she claims was present prior to surgery (Carlos's sign negative).  The patient was instructed in a home exercise program to address the above findings/deficits. The patient should benefit from therapy services to progress toward functional goals and improve quality of life.  ===========================================================================================  History HIGH Complexity :3+ comorbidities / personal factors will impact the outcome/ POC ; Examination HIGH Complexity : 4+ Standardized tests and measures addressing body structure, function, activity limitation and / or participation in recreation ; Presentation MEDIUM Complexity : Evolving with changing characteristics ; Decision Making MEDIUM Complexity : FOTO score of 26-74; Complexity MEDIUM  Problem List: pain affecting function, decrease ROM, decrease strength, impaired gait/ balance, decrease ADL/ functional abilitiies, decrease activity tolerance and decrease flexibility/ joint mobility   Treatment Plan may include any combination of the following: Therapeutic exercise, Therapeutic activities, Physical agent/modality, Gait/balance training, Manual therapy, Patient education and Functional mobility training  Patient / Family readiness to learn indicated by: asking questions, trying to perform skills and interest  Persons(s) to be included in education: patient (P)  Barriers to Learning/Limitations: None  Measures taken:    Patient Goal (s): Less stiffness, better walking   Patient self reported health status: good  Rehabilitation Potential: good   Short Term Goals: To be accomplished in  6  treatments: Independent/compliant with long term HEP for ROM, LE strength  Improve knee AROM by 10 degrees to improve functional AROM  Pt will safely progress to cane for short distance with proper gait sequence   Long Term Goals:  To be accomplished in  12  treatments:  Improve FOTO outcome score by 23% to indicate a significant improvement in ADL function  Pt will be able to climb/descend stairs reciprocally using railing  Pt will progress to functional/efficient cane ambulation for long distance, no device for short distance  Frequency / Duration:   Patient to be seen  3  times per week for 4  weeks:  Patient / Caregiver education and instruction: activity modification and exercises  G-Codes (GP): Mobility [id number] Current  CL= 60-79%   Goal  CK= 40-59%. The severity rating is based on the FOTO Score  Therapist Signature: Ellyn Conner PT, OCS Date: 6/23/1756   Certification Period: 4/16/18-7/15/18 Time: 12:53 PM   ===========================================================================================  I certify that the above Physical Therapy Services are being furnished while the patient is under my care. I agree with the treatment plan and certify that this therapy is necessary. Physician Signature:        Date:       Time:     Please sign and return to [facility] or you may fax the signed copy to [phone]. Thank you.

## 2018-04-19 ENCOUNTER — APPOINTMENT (OUTPATIENT)
Dept: PHYSICAL THERAPY | Age: 73
End: 2018-04-19

## 2018-04-20 ENCOUNTER — HOSPITAL ENCOUNTER (OUTPATIENT)
Dept: PHYSICAL THERAPY | Age: 73
Discharge: HOME | End: 2018-04-20
Payer: MEDICARE

## 2018-04-20 PROCEDURE — 97014 ELECTRIC STIMULATION THERAPY: CPT

## 2018-04-20 PROCEDURE — 97140 MANUAL THERAPY 1/> REGIONS: CPT

## 2018-04-20 PROCEDURE — 97110 THERAPEUTIC EXERCISES: CPT

## 2018-04-20 NOTE — PROGRESS NOTES
PT DAILY TREATMENT NOTE - [facility]     Patient Name: Eun Holt  Date:2018  : 1945  [x]  Patient  Verified  Payor: VA MEDICARE / Plan: VA MEDICARE PART A & B / Product Type: Medicare /    In time:9:00 Out time:10:18  Total Treatment Time (min): 78  Total Timed Codes (min): 63  1:1 Treatment Time ( [address] only):  40  Visit #: 2 of     Treatment Area: S/P total knee replacement, right [Z96.651]    SUBJECTIVE  Pain Level IN:(0-10 scale): 4/10  Pain Level OUT: (0-10 scale) post treatment: 2/10    Any medication changes, allergies to medications, adverse drug reactions, diagnosis change, or new procedure performed?: [x] No    [] Yes (see summary sheet for update)  Subjective functional status/changes:   [] No changes reported  It feels tight, I would like to walk without my walker. I put my stocking back on because the other PT shima said it could help with the swelling and I am afraid that my scar will get keloid up since I have a history and problem of that.     OBJECTIVE    Modality rationale: decrease edema, decrease inflammation and decrease pain to improve the patients ability to ambulate without RW   Min Type Additional Details   15 [x] Estim:  [x]Unatt       [x]IFC  []Premod                        []Other:  [x]w/ice   [x]w/heat  Position: in long sitting  Location:     [] Estim: []Att    []TENS instruct  []NMES                    []Other:  []w/US   []w/ice   []w/heat  Position:  Location:    []  Traction: [] Cervical       []Lumbar                       [] Prone          []Supine                       []Intermittent   []Continuous Lbs:  [] before manual  [] after manual    []  Ultrasound: []Continuous   [] Pulsed                           []1MHz   []3MHz W/cm2:  Location:    []  Iontophoresis with dexamethasone         Location: [] Take home patch   [] In clinic    []  Ice     []  heat  []  Ice massage  []  Laser   []  Anodyne Position:  Location:    []  Laser with stim  []  Other: Position:  Location:    []  Vasopneumatic Device Pressure:       [] lo [] med [] hi   Temperature: [] lo [] med [] hi   [] Skin assessment post-treatment:  []intact []redness- no adverse reaction    []redness  adverse reaction:       48 min Therapeutic Exercise:  [x] See flow sheet : first follow up visit since initial evaluation - initiated POC per flow sheet    Rationale: increase ROM, increase strength, improve coordination and improve balance to improve the patients ability to ambulate and perform normal ADL's      15 min Manual Therapy: In sitting and in supine PROM to increase knee flexion and extension and scar massage and cleaned incision site off, removed last piece of remaining glue mesh over distal end of incision site as well as saline solution over two small opening noted to superior aspect of incision site -    Rationale: decrease pain, increase ROM, increase tissue extensibility, decrease edema  and decrease trigger points to (R) knee      With   [] TE   [] TA   [] neuro   [] other: Patient Education: [x] Review HEP    [] Progressed/Changed HEP based on:   [] positioning   [] body mechanics   [] transfers   [] heat/ice application    [] other:      Objective/Functional Measures with Therapist Assessment Noted with Response to Therapy Session[de-identified]   first follow up visit since initial evaluation - initiated POC per flow sheet  Cleaned incision site from remaining glue mesh to distal aspect of the knee and then applied saline solution to keep superior aspect of incision site clean secondary to two small opening noted - possible due to internal stitches - encouraged patient to dab area with the saline solution with sterile gauze given to patient and avoid applying any creams/lotion to area until completely closed.  Encouraged patient to continue to wear compression stocking as well as perform two exercises given today (refer to handouts) to progress with terminal extension and increase knee flexion ASSESSMENT/Changes in Function:   Patient will continue to benefit from skilled PT services to modify and progress therapeutic interventions, address functional mobility deficits, address ROM deficits, address strength deficits, analyze and address soft tissue restrictions and instruct in home and community integration to attain remaining goals. [x]  See Plan of Care  []  See progress note/recertification  []  See Discharge Summary         Progress towards goals / Updated goals: · Short Term Goals: To be accomplished in  6  treatments: Independent/compliant with long term HEP for ROM, LE strength  Improve knee AROM by 10 degrees to improve functional AROM  Pt will safely progress to cane for short distance with proper gait sequence  · Long Term Goals:  To be accomplished in  12  treatments:  Improve FOTO outcome score by 23% to indicate a significant improvement in ADL function  Pt will be able to climb/descend stairs reciprocally using railing  Pt will progress to functional/efficient cane ambulation for long distance, no device for short distance    PLAN  [x]  Upgrade activities as tolerated     [x]  Continue plan of care  []  Update interventions per flow sheet       []  Discharge due to:_  []  Other:_      Candy Mora PTA 4/20/2018  8:52 AM    Future Appointments  Date Time Provider Fabiola Flynn   4/20/2018 9:00 AM Candy Mora PTA [facility]

## 2018-04-23 ENCOUNTER — HOSPITAL ENCOUNTER (OUTPATIENT)
Dept: PHYSICAL THERAPY | Age: 73
Discharge: HOME | End: 2018-04-23
Payer: MEDICARE

## 2018-04-23 PROCEDURE — 97110 THERAPEUTIC EXERCISES: CPT

## 2018-04-23 PROCEDURE — 97014 ELECTRIC STIMULATION THERAPY: CPT

## 2018-04-23 PROCEDURE — 97140 MANUAL THERAPY 1/> REGIONS: CPT

## 2018-04-23 NOTE — PROGRESS NOTES
PT DAILY TREATMENT NOTE - [facility]     Patient Name: Eun Holt  Date:2018  : 1945  [x]  Patient  Verified  Payor: VA MEDICARE / Plan: VA MEDICARE PART A & B / Product Type: Medicare /    In time:7:35 Out time: 8:45  Total Treatment Time (min): 70  Total Timed Codes (min):55  1:1 Treatment Time ( [address] only):  45  Visit #: 3 of     Treatment Area: S/P total knee replacement, right [Z96.651]    SUBJECTIVE  Pain Level IN:(0-10 scale): 3/10  Pain Level OUT: (0-10 scale) post treatment: 2/10    Any medication changes, allergies to medications, adverse drug reactions, diagnosis change, or new procedure performed?: [x] No    [] Yes (see summary sheet for update)  Subjective functional status/changes:   [] No changes reported  I was out of the house for 6 hours yesterday for Mandaen and it was very sensitivity after the last time I was here, even the bed sheets gave me a problem.  I did forget to wear the stocking to [place]anda[place] and my leg was very swollen when I got home but I prop ed it up and that helped     OBJECTIVE    Modality rationale: decrease edema, decrease inflammation and decrease pain to improve the patients ability to ambulate without RW   Min Type Additional Details   15 [x] Estim:  [x]Unatt       [x]IFC  []Premod                        []Other:  [x]w/ice   [x]w/heat  Position: in long sitting  Location:     [] Estim: []Att    []TENS instruct  []NMES                    []Other:  []w/US   []w/ice   []w/heat  Position:  Location:    []  Traction: [] Cervical       []Lumbar                       [] Prone          []Supine                       []Intermittent   []Continuous Lbs:  [] before manual  [] after manual    []  Ultrasound: []Continuous   [] Pulsed                           []1MHz   []3MHz W/cm2:  Location:    []  Iontophoresis with dexamethasone         Location: [] Take home patch   [] In clinic    []  Ice     []  heat  []  Ice massage  []  Laser   []  Anodyne Position:  Location:    [] Laser with stim  []  Other:  Position:  Location:    []  Vasopneumatic Device Pressure:       [] lo [] med [] hi   Temperature: [] lo [] med [] hi   [] Skin assessment post-treatment:  []intact []redness- no adverse reaction    []redness  adverse reaction:       40 min Therapeutic Exercise:  [x] See flow sheet :  Added TKE in parallel bars with green t-band   Rationale: increase ROM, increase strength, improve coordination and improve balance to improve the patients ability to ambulate and perform normal ADL's      15 min Manual Therapy: In sitting  PROM to increase knee flexion and extension and scar massage -    Rationale: decrease pain, increase ROM, increase tissue extensibility, decrease edema  and decrease trigger points to (R) knee      With   [] TE   [] TA   [] neuro   [] other: Patient Education: [x] Review HEP    [] Progressed/Changed HEP based on:   [] positioning   [] body mechanics   [] transfers   [] heat/ice application    [] other:      Objective/Functional Measures with Therapist Assessment Noted with Response to Therapy Session[de-identified]  Patient arrived to therapy with no RW and SPC with good gait pattern  Improved incision site with decreased adhesion of the incision site and two small openings beginning to close as well  Tolerated all exercises well - continue with POC to increase ROM, strength and gait pattern with use of SPC      ASSESSMENT/Changes in Function:   Patient will continue to benefit from skilled PT services to modify and progress therapeutic interventions, address functional mobility deficits, address ROM deficits, address strength deficits, analyze and address soft tissue restrictions and instruct in home and community integration to attain remaining goals. [x]  See Plan of Care  []  See progress note/recertification  []  See Discharge Summary         Progress towards goals / Updated goals: · Short Term Goals: To be accomplished in  6  treatments:   Independent/compliant with long term HEP for ROM, LE strength  Improve knee AROM by 10 degrees to improve functional AROM  Pt will safely progress to cane for short distance with proper gait sequence  · Long Term Goals:  To be accomplished in  12  treatments:  Improve FOTO outcome score by 23% to indicate a significant improvement in ADL function  Pt will be able to climb/descend stairs reciprocally using railing  Pt will progress to functional/efficient cane ambulation for long distance, no device for short distance    PLAN  [x]  Upgrade activities as tolerated     [x]  Continue plan of care  []  Update interventions per flow sheet       []  Discharge due to:_  []  Other:_      Paradise Wallace PTA 4/23/2018  8:52 AM    Future Appointments  Date Time Provider Fabiola Flynn   4/25/2018 2:30 PM Zachary Zhao PT [facility]   5/1/2018 9:30 AM Paradise Wallace PTA [facility]   5/2/2018 8:00 AM Paradise Wallace PTA [facility]   5/4/2018 9:00 AM Paradise Wallace PTA [facility]   5/7/2018 9:00 AM Paradise Wallace PTA [facility]   5/9/2018 4:00 PM Paradise Wallace PTA [facility]   5/11/2018 10:00 AM Paradise Wallace PTA [facility]   5/14/2018 9:00 AM Paradise Wallace PTA [facility]   5/16/2018 9:00 AM Paradise Wallace PTA [facility]   5/18/2018 10:00 AM Paradise Wallace PTA [facility]   5/21/2018 9:00 AM Paradise Wallace PTA [facility]   5/23/2018 9:00 AM Paradise Wallace PTA [facility]   5/25/2018 10:00 AM Paradise Wallace, ZOHRA [facility]   5/29/2018 9:00 AM Paradise Wallace PTA [facility]   5/30/2018 9:00 AM Paradise Wallace PTA [facility]   6/1/2018 10:00 AM Paradise Wallace PTA [facility]

## 2018-04-25 ENCOUNTER — HOSPITAL ENCOUNTER (OUTPATIENT)
Dept: PHYSICAL THERAPY | Age: 73
Discharge: HOME | End: 2018-04-25
Payer: MEDICARE

## 2018-04-25 PROCEDURE — 97140 MANUAL THERAPY 1/> REGIONS: CPT

## 2018-04-25 PROCEDURE — 97016 VASOPNEUMATIC DEVICE THERAPY: CPT

## 2018-04-25 PROCEDURE — 97110 THERAPEUTIC EXERCISES: CPT

## 2018-04-25 NOTE — PROGRESS NOTES
PT DAILY TREATMENT NOTE - [facility]     Patient Name: Iris Washburn  Date:2018  : 1945  [x]  Patient  Verified  Payor: Maggy [place] / Plan: [place] & B / Product Type: Medicare /    In time: 230  Out time: 345  Total Treatment Time (min): 75  Total Timed Codes (min): 60  1:1 Treatment Time ( only):  39  Visit #: 4 of     Treatment Area: S/P total knee replacement, right [Z96.651]    SUBJECTIVE  Pain Level IN:(0-10 scale): 3/10  Pain Level OUT: (0-10 scale) post treatment: 2/10    Any medication changes, allergies to medications, adverse drug reactions, diagnosis change, or new procedure performed?: [x] No    [] Yes (see summary sheet for update)  Subjective functional status/changes:   [] No changes reported  Im feeling a little bit better, its just so stiff.      OBJECTIVE    Modality rationale: decrease edema, decrease inflammation and decrease pain to improve the patients ability to ambulate without RW   Min Type Additional Details    [] Estim:  []Unatt       []IFC  []Premod                        []Other:  []w/ice   [x]w/heat  Position: in long sitting  Location:     [] Estim: []Att    []TENS instruct  []NMES                    []Other:  []w/US   []w/ice   []w/heat  Position:  Location:    []  Traction: [] Cervical       []Lumbar                       [] Prone          []Supine                       []Intermittent   []Continuous Lbs:  [] before manual  [] after manual    []  Ultrasound: []Continuous   [] Pulsed                           []1MHz   []3MHz W/cm2:  Location:    []  Iontophoresis with dexamethasone         Location: [] Take home patch   [] In clinic    []  Ice     []  heat  []  Ice massage  []  Laser   []  Anodyne Position:  Location:    []  Laser with stim  []  Other:  Position:  Location:   15 [x]  Vasopneumatic Device Pressure:       [] lo [x] med [] hi   Temperature: [x] lo [] med [] hi   [x] Skin assessment post-treatment:  [x]intact [x]redness- no adverse reaction    []redness  adverse reaction:       45/40 min Therapeutic Exercise:  [x] See flow sheet : 5 min warm up NC 1:1 30   Rationale: increase ROM, increase strength, improve coordination and improve balance to improve the patients ability to ambulate and perform normal ADL's      15 min Manual Therapy: In supine PROM to increase knee flexion and extension and scar massage -    Rationale: decrease pain, increase ROM, increase tissue extensibility, decrease edema  and decrease trigger points to (R) knee      With   [] TE   [] TA   [] neuro   [] other: Patient Education: [x] Review HEP    [] Progressed/Changed HEP based on:   [] positioning   [] body mechanics   [] transfers   [] heat/ice application    [] other:      Objective/Functional Measures with Therapist Assessment Noted with Response to Therapy Session[de-identified]  Scar massage- small stitch has come to surface at very distal end of surgical incision. Multiple very small blister type irritation is present faith incision. Increased scar mobility post massage    ASSESSMENT/Changes in Function:   Patient tolerated therex and manual therapy well. Decreased tolerance to extension stretch in supine due to c/o increased posterior knee pain. Patient will continue to benefit from skilled PT services to modify and progress therapeutic interventions, address functional mobility deficits, address ROM deficits, address strength deficits, analyze and address soft tissue restrictions and instruct in home and community integration to attain remaining goals. []  See Plan of Care  []  See progress note/recertification  []  See Discharge Summary         Progress towards goals / Updated goals: · Short Term Goals: To be accomplished in  6  treatments: Independent/compliant with long term HEP for ROM, LE strength  Improve knee AROM by 10 degrees to improve functional AROM  Pt will safely progress to cane for short distance with proper gait sequence.  Progressing 4/25/18 HLL    · Long Term Goals:  To be accomplished in  12  treatments:  Improve FOTO outcome score by 23% to indicate a significant improvement in ADL function  Pt will be able to climb/descend stairs reciprocally using railing  Pt will progress to functional/efficient cane ambulation for long distance, no device for short distance    PLAN  [x]  Upgrade activities as tolerated     [x]  Continue plan of care  []  Update interventions per flow sheet       []  Discharge due to:_  []  Other:_      Yao Ching PT 4/25/2018  8:52 AM    Future Appointments  Date Time Provider Fabiola Flynn   4/26/2018 9:00 AM [address] [facility]   5/1/2018 9:30 AM Cabrera Mutters, PTA [facility]   5/2/2018 8:00 AM Cabrera Mutters, PTA [facility]   5/4/2018 9:00 AM Cabrera Mutters, PTA [facility]   5/7/2018 9:00 AM Cabrera Mutters, PTA [facility]   5/9/2018 4:00 PM Cabrera Mutters, PTA [facility]   5/11/2018 10:00 AM Cabrera Mutters, PTA [facility]   5/14/2018 9:00 AM Cabrera Mutters, PTA [facility]   5/16/2018 9:00 AM Cabrera Mutters, PTA [facility]   5/18/2018 10:00 AM Cabrera Mutters, PTA [facility]   5/21/2018 9:00 AM Cabrera Mutters, PTA [facility]   5/23/2018 9:00 AM Cabrera Mutters, PTA [facility]   5/25/2018 10:00 AM Cabrera Mutters, PTA [facility]   5/29/2018 9:00 AM Deborah Rosales, PTA [facility]   5/30/2018 9:00 AM Deborah Rosales, PTA [facility]   6/1/2018 10:00 AM Deborah Rosales, PTA [facility]

## 2018-04-26 ENCOUNTER — HOSPITAL ENCOUNTER (OUTPATIENT)
Dept: PHYSICAL THERAPY | Age: 73
Discharge: HOME | End: 2018-04-26
Payer: MEDICARE

## 2018-04-26 PROCEDURE — 97110 THERAPEUTIC EXERCISES: CPT

## 2018-04-26 PROCEDURE — 97016 VASOPNEUMATIC DEVICE THERAPY: CPT

## 2018-04-26 PROCEDURE — 97140 MANUAL THERAPY 1/> REGIONS: CPT

## 2018-04-26 NOTE — PROGRESS NOTES
PT DAILY TREATMENT NOTE - [facility]     Patient Name: Shane Blunt  Date:2018  : 1945  [x]  Patient  Verified  Payor: [place] MEDICARE / Plan: [place] MEDICARE PART A & B / Product Type: Medicare /    In time: 9:01  Out time: 10:25  Total Treatment Time (min): 84  Total Timed Codes (min): 74  1:1 Treatment Time ( [address] only): 55  Visit #: 5 of     Treatment Area: S/P total knee replacement, right [Z96.651]    SUBJECTIVE  Pain Level IN:(0-10 scale): 3/10  Pain Level OUT: (0-10 scale) post treatment: 2/10    Any medication changes, allergies to medications, adverse drug reactions, diagnosis change, or new procedure performed?: [x] No    [] Yes (see summary sheet for update)  Subjective functional status/changes:   [] No changes reported  \"It's been really sensitive, like when a sheet touches it. \"    OBJECTIVE    Modality rationale: decrease edema, decrease inflammation and decrease pain to improve the patients ability to ambulate without RW   Min Type Additional Details    [] Estim:  []Unatt       []IFC  []Premod                        []Other:  []w/ice   [x]w/heat  Position: in long sitting  Location:     [] Estim: []Att    []TENS instruct  []NMES                    []Other:  []w/US   []w/ice   []w/heat  Position:  Location:    []  Traction: [] Cervical       []Lumbar                       [] Prone          []Supine                       []Intermittent   []Continuous Lbs:  [] before manual  [] after manual    []  Ultrasound: []Continuous   [] Pulsed                           []1MHz   []3MHz W/cm2:  Location:    []  Iontophoresis with dexamethasone         Location: [] Take home patch   [] In clinic    []  Ice     []  heat  []  Ice massage  []  Laser   []  Anodyne Position:  Location:    []  Laser with stim  []  Other:  Position:  Location:   15+5 set up [x]  Vasopneumatic Device Pressure:       [] lo [x] med [] hi   Temperature: [x] lo [] med [] hi   [x] Skin assessment post-treatment:  [x]intact [x]redness- no adverse reaction    []redness  adverse reaction:       49/44 min Therapeutic Exercise:  [x] See flow sheet : 5 min warm up NC, 1:1 31'   Rationale: increase ROM, increase strength, improve coordination and improve balance to improve the patients ability to ambulate and perform normal ADL's      15 min Manual Therapy: In supine PROM to increase knee flexion and extension and scar massage. Rationale: decrease pain, increase ROM, increase tissue extensibility, decrease edema  and decrease trigger points to (R) knee      With   [] TE   [] TA   [] neuro   [] other: Patient Education: [x] Review HEP    [] Progressed/Changed HEP based on:   [] positioning   [] body mechanics   [] transfers   [] heat/ice application    [] other:      Objective/Functional Measures:  - Scar massage- small stitch still present at very distal end of surgical incision.   - Continued multiple small blister type irritation is present faith incision. ASSESSMENT/Changes in Function:   Patient continues to tolerate therex and manual therapy well. She does not require any cuing at this time. Continue to progress as able. Patient will continue to benefit from skilled PT services to modify and progress therapeutic interventions, address functional mobility deficits, address ROM deficits, address strength deficits, analyze and address soft tissue restrictions and instruct in home and community integration to attain remaining goals. [x]  See Plan of Care  []  See progress note/recertification  []  See Discharge Summary         Progress towards goals / Updated goals: · Short Term Goals: To be accomplished in  6  treatments: Independent/compliant with long term HEP for ROM, LE strength. Current: Pt reports she does 3x daily, except on days she has therapy. 4/26/18  Improve knee AROM by 10 degrees to improve functional AROM  Pt will safely progress to cane for short distance with proper gait sequence.  Progressing 4/25/18 HLL    · Long Term Goals:  To be accomplished in  12  treatments:  Improve FOTO outcome score by 23% to indicate a significant improvement in ADL function  Pt will be able to climb/descend stairs reciprocally using railing  Pt will progress to functional/efficient cane ambulation for long distance, no device for short distance    PLAN  [x]  Upgrade activities as tolerated     [x]  Continue plan of care  []  Update interventions per flow sheet       []  Discharge due to:_  []  Other:_      ARGELIA George 4/26/2018  10:00 AM    Future Appointments  Date Time Provider Fabiola Flynn   5/1/2018 9:30 AM Kael Claudia, PTA [facility]   5/2/2018 8:00 AM Kael Claudia, PTA [facility]   5/4/2018 9:00 AM Kael Claudia, PTA [facility]   5/7/2018 9:00 AM Kael Claudia, PTA [facility]   5/9/2018 4:00 PM Kael Claudia, PTA [facility]   5/11/2018 10:00 AM Kael Claudia, PTA [facility]   5/14/2018 9:00 AM Kael Claudia, PTA [facility]   5/16/2018 9:00 AM Kael Claudia, PTA [facility]   5/18/2018 10:00 AM Kael Claudia, PTA [facility]   5/21/2018 9:00 AM Kael Claudia, PTA [facility]   5/23/2018 9:00 AM Kael Claudia, PTA [facility]   5/25/2018 10:00 AM Kael Claudia, PTA [facility]   5/29/2018 9:00 AM Kael Claudia, PTA [facility]   5/30/2018 9:00 AM [facility]   6/1/2018 10:00 AM [facility]

## 2018-05-01 ENCOUNTER — HOSPITAL ENCOUNTER (OUTPATIENT)
Dept: PHYSICAL THERAPY | Age: 73
Discharge: HOME | End: 2018-05-01
Payer: MEDICARE

## 2018-05-01 PROCEDURE — 97016 VASOPNEUMATIC DEVICE THERAPY: CPT

## 2018-05-01 PROCEDURE — 97140 MANUAL THERAPY 1/> REGIONS: CPT

## 2018-05-01 PROCEDURE — 97110 THERAPEUTIC EXERCISES: CPT

## 2018-05-01 NOTE — PROGRESS NOTES
PT DAILY TREATMENT NOTE - [facility]     Patient Name: Reuben Le  Date:2018  : 1945  [x]  Patient  Verified  Payor: [place] MEDICARE / Plan: [place] MEDICARE PART A & B / Product Type: Medicare /    In time: 9:30  Out time: 10:40  Total Treatment Time (min): 70  Total Timed Codes (min): 55  1:1 Treatment Time [facility] only):45  Visit #: 6 of     Treatment Area: S/P total knee replacement, right [Z96.651]    SUBJECTIVE  Pain Level IN:(0-10 scale): 3/10  Pain Level OUT: (0-10 scale) post treatment: 110    Any medication changes, allergies to medications, adverse drug reactions, diagnosis change, or new procedure performed?: [x] No    [] Yes (see summary sheet for update)  Subjective functional status/changes:   [] No changes reported  It jist feels so tight in the knee this morning     OBJECTIVE    Modality rationale: decrease edema, decrease inflammation and decrease pain to improve the patients ability to ambulate without RW   Min Type Additional Details    [] Estim:  []Unatt       []IFC  []Premod                        []Other:  []w/ice   [x]w/heat  Position: in long sitting  Location:     [] Estim: []Att    []TENS instruct  []NMES                    []Other:  []w/US   []w/ice   []w/heat  Position:  Location:    []  Traction: [] Cervical       []Lumbar                       [] Prone          []Supine                       []Intermittent   []Continuous Lbs:  [] before manual  [] after manual    []  Ultrasound: []Continuous   [] Pulsed                           []1MHz   []3MHz W/cm2:  Location:    []  Iontophoresis with dexamethasone         Location: [] Take home patch   [] In clinic    []  Ice     []  heat  []  Ice massage  []  Laser   []  Anodyne Position:  Location:    []  Laser with stim  []  Other:  Position:  Location:   15 [x]  Vasopneumatic Device Pressure:       [] lo [x] med [] hi   Temperature: [x] lo [] med [] hi   [x] Skin assessment post-treatment:  [x]intact [x]redness- no adverse reaction []redness  adverse reaction:       35 min Therapeutic Exercise:  [x] See flow sheet :  Assisted patient with completing full revolution on the bike, backwards  Then assisted with normalizing gait pattern without use of cane and increasing speed, active knee flexion  and increase stride length    Rationale: increase ROM, increase strength, improve coordination and improve balance to improve the patients ability to ambulate and perform normal ADL's      20 min Manual Therapy: In supine, seated EOB, prone PROM to increase knee flexion and extension and scar massage. Removed remaining suture from superior and distal aspect of the incision site   Rationale: decrease pain, increase ROM, increase tissue extensibility, decrease edema  and decrease trigger points to (R) knee      With   [] TE   [] TA   [] neuro   [] other: Patient Education: [x] Review HEP    [] Progressed/Changed HEP based on:   [] positioning   [] body mechanics   [] transfers   [] heat/ice application    [] other:      Objective/Functional Measures:  93 in supine after stretching in prone  Removed final small piece of remaining suture to the superior and posterior aspect of incision site  Was able to assist patient with full revolution backwards on the bike  And normalizing gait pattern with proper speed, knee flexion and stride length with no use of SPC     ASSESSMENT/Changes in Function:   Patient will continue to benefit from skilled PT services to modify and progress therapeutic interventions, address functional mobility deficits, address ROM deficits, address strength deficits, analyze and address soft tissue restrictions and instruct in home and community integration to attain remaining goals. [x]  See Plan of Care  []  See progress note/recertification  []  See Discharge Summary         Progress towards goals / Updated goals: · Short Term Goals: To be accomplished in  6  treatments:   Independent/compliant with long term HEP for ROM, LE strength. Current: Pt reports she does 3x daily, except on days she has therapy. 4/26/18  Improve knee AROM by 10 degrees to improve functional AROM  Pt will safely progress to cane for short distance with proper gait sequence. Progressing 4/25/18 HLL    · Long Term Goals:  To be accomplished in  12  treatments:  Improve FOTO outcome score by 23% to indicate a significant improvement in ADL function  Pt will be able to climb/descend stairs reciprocally using railing  Pt will progress to functional/efficient cane ambulation for long distance, no device for short distance    PLAN  [x]  Upgrade activities as tolerated     [x]  Continue plan of care  []  Update interventions per flow sheet       []  Discharge due to:_  []  Other:_      Froilan Ramirez PTA, ARGELIA 5/1/2018  10:00 AM    Future Appointments  Date Time Provider Fabiola Flynn   5/2/2018 8:00 AM Froilan Ramirez PTA [facility]   5/4/2018 9:00 AM Froilan Ramirez PTA [facility]   5/7/2018 9:00 AM [facility]   5/9/2018 4:00 PM Froilan Ramirez PTA [facility]   5/11/2018 10:00 AM [facility]   5/14/2018 9:00 AM Froilan Ramirez PTA [facility]   5/16/2018 9:00 AM Froilan Ramirez, PTA [facility]   5/18/2018 10:00 AM Froilan Ramirez PTA [facility]   5/21/2018 9:00 AM Froilan Ramirez, PTA [facility]   5/23/2018 9:00 AM Froilan Ramirez, PTA [facility]   5/25/2018 10:00 AM Froilan Ramirez PTA [facility]   5/29/2018 9:00 AM Froilan Ramirez, PTA [facility]   5/30/2018 9:00 AM Froilan Ramirez, PTA [facility]   6/1/2018 10:00 AM Froilan Ramirez, Z[facility]

## 2018-05-02 ENCOUNTER — HOSPITAL ENCOUNTER (OUTPATIENT)
Dept: PHYSICAL THERAPY | Age: 73
Discharge: HOME | End: 2018-05-02
Payer: MEDICARE

## 2018-05-02 PROCEDURE — 97140 MANUAL THERAPY 1/> REGIONS: CPT

## 2018-05-02 PROCEDURE — 97110 THERAPEUTIC EXERCISES: CPT

## 2018-05-02 PROCEDURE — 97016 VASOPNEUMATIC DEVICE THERAPY: CPT

## 2018-05-02 NOTE — PROGRESS NOTES
PT DAILY TREATMENT NOTE - [facility]     Patient Name: Shane Blunt  Date:2018  : 1945  [x]  Patient  Verified  Payor: [place] MEDICARE / Plan: [place] MEDICARE PART A & B / Product Type: Medicare /    In time:8:02   Out time: 9:18  Total Treatment Time (min): 76  Total Timed Codes (min): 61  1:1 Treatment Time ( [address] only):40  Visit #:7  of     Treatment Area: S/P total knee replacement, right [Z96.651]    SUBJECTIVE  Pain Level IN:(0-10 scale):  3/10  Pain Level OUT: (0-10 scale) post treatment: 110    Any medication changes, allergies to medications, adverse drug reactions, diagnosis change, or new procedure performed?: [x] No    [] Yes (see summary sheet for update)  Subjective functional status/changes:   [] No changes reported  I am sore from yesterday but I know that it has to be done      OBJECTIVE    Modality rationale: decrease edema, decrease inflammation and decrease pain to improve the patients ability to ambulate without RW   Min Type Additional Details    [] Estim:  []Unatt       []IFC  []Premod                        []Other:  []w/ice   [x]w/heat  Position: in long sitting  Location:     [] Estim: []Att    []TENS instruct  []NMES                    []Other:  []w/US   []w/ice   []w/heat  Position:  Location:    []  Traction: [] Cervical       []Lumbar                       [] Prone          []Supine                       []Intermittent   []Continuous Lbs:  [] before manual  [] after manual    []  Ultrasound: []Continuous   [] Pulsed                           []1MHz   []3MHz W/cm2:  Location:    []  Iontophoresis with dexamethasone         Location: [] Take home patch   [] In clinic    []  Ice     []  heat  []  Ice massage  []  Laser   []  Anodyne Position:  Location:    []  Laser with stim  []  Other:  Position:  Location:   15 [x]  Vasopneumatic Device Pressure:       [] lo [x] med [] hi   Temperature: [x] lo [] med [] hi   [x] Skin assessment post-treatment:  [x]intact [x]redness- no adverse reaction    []redness  adverse reaction:       41 min Therapeutic Exercise:  [x] See flow sheet :      Rationale: increase ROM, increase strength, improve coordination and improve balance to improve the patients ability to ambulate and perform normal ADL's      20 min Manual Therapy: In supine, seated EOB, prone PROM to increase knee flexion and extension and scar massage. Rationale: decrease pain, increase ROM, increase tissue extensibility, decrease edema  and decrease trigger points to (R) knee      With   [] TE   [] TA   [] neuro   [] other: Patient Education: [x] Review HEP    [] Progressed/Changed HEP based on:   [] positioning   [] body mechanics   [] transfers   [] heat/ice application    [] other:      Objective/Functional Measures:  Patient was able to achieve 100 with knee flexion in supine after stretching in prone and supine   Was able to make backwards revolution on the bike  Ambulated into therapy with no SPC and a min gait deviation that is able to be decreased with verbal cuing to be made aware of her deviation to increase knee flexion and increase stride length     ASSESSMENT/Changes in Function:   Patient will continue to benefit from skilled PT services to modify and progress therapeutic interventions, address functional mobility deficits, address ROM deficits, address strength deficits, analyze and address soft tissue restrictions and instruct in home and community integration to attain remaining goals. [x]  See Plan of Care  []  See progress note/recertification  []  See Discharge Summary         Progress towards goals / Updated goals: · Short Term Goals: To be accomplished in  6  treatments: Independent/compliant with long term HEP for ROM, LE strength. Current: Pt reports she does 3x daily, except on days she has therapy. 4/26/18  Improve knee AROM by 10 degrees to improve functional AROM  Pt will safely progress to cane for short distance with proper gait sequence.  Progressing 4/25/18 HLL    · Long Term Goals:  To be accomplished in  12  treatments:  Improve FOTO outcome score by 23% to indicate a significant improvement in ADL function  Pt will be able to climb/descend stairs reciprocally using railing  Pt will progress to functional/efficient cane ambulation for long distance, no device for short distance    PLAN  [x]  Upgrade activities as tolerated     [x]  Continue plan of care  []  Update interventions per flow sheet       []  Discharge due to:_  []  Other:_      John Paul Blackmon, ZOHRA, ARGELIA 5/2/2018  10:00 AM    Future Appointments  Date Time Provider Fabiola Flynn   5/2/2018 8:00 AM John Paul Blackmon, PTA [facility]   5/4/2018 9:00 AM John Paul Blackmon, PTA [facility]   5/7/2018 9:00 AM John Paul Blackmon, PTA [facility]   5/10/2018 9:30 AM Cynthia Pemberton, DPT [facility]   5/11/2018 10:00 AM John Paul Blackmon, PTA [facility]   5/14/2018 9:00 AM [facility]   5/16/2018 9:00 AM [facility] Blackmon[facility]   5/18/2018 10:00 AM [facility] Keen, [facility]   5/21/2018 9:00 AM [facility] Keen, [facility]   5/23/2018 9:00 AM [facility] Keen[facility]   5/25/2018 10:00 AM John Paul Keen, PTA [facility]   5/29/2018 9:00 AM John Paul Keen, PTA [facility]   5/30/2018 9:00 AM John Paul Blackmon, ZOHRA [facility]   6/1/2018 10:00 AM John Paul Blackmon, PTA [facility]

## 2018-05-04 ENCOUNTER — HOSPITAL ENCOUNTER (OUTPATIENT)
Dept: PHYSICAL THERAPY | Age: 73
Discharge: HOME | End: 2018-05-04
Payer: MEDICARE

## 2018-05-04 PROCEDURE — 97140 MANUAL THERAPY 1/> REGIONS: CPT

## 2018-05-04 PROCEDURE — 97110 THERAPEUTIC EXERCISES: CPT

## 2018-05-04 PROCEDURE — 97016 VASOPNEUMATIC DEVICE THERAPY: CPT

## 2018-05-04 NOTE — PROGRESS NOTES
PT DAILY TREATMENT NOTE - [facility]     Patient Name: Nilton Still  Date:2018  : 1945  [x]  Patient  Verified  Payor: [place] MEDICARE / Plan: VA MEDICARE PART A & B / Product Type: Medicare /    In time: 9:00   Out time: 10:08  Total Treatment Time (min): 68  Total Timed Codes (min): 53  1:1 Treatment Time [facility] only):30  Visit #:8  of     Treatment Area: S/P total knee replacement, right [Z96.651]    SUBJECTIVE  Pain Level IN:(0-10 scale): stiff 2/10  Pain Level OUT: (0-10 scale) post treatment: 1/10    Any medication changes, allergies to medications, adverse drug reactions, diagnosis change, or new procedure performed?: [x] No    [] Yes (see summary sheet for update)  Subjective functional status/changes:   [] No changes reported   This has been a tough week, I am super stiff     OBJECTIVE    Modality rationale: decrease edema, decrease inflammation and decrease pain to improve the patients ability to ambulate without RW   Min Type Additional Details    [] Estim:  []Unatt       []IFC  []Premod                        []Other:  []w/ice   [x]w/heat  Position: in long sitting  Location:     [] Estim: []Att    []TENS instruct  []NMES                    []Other:  []w/US   []w/ice   []w/heat  Position:  Location:    []  Traction: [] Cervical       []Lumbar                       [] Prone          []Supine                       []Intermittent   []Continuous Lbs:  [] before manual  [] after manual    []  Ultrasound: []Continuous   [] Pulsed                           []1MHz   []3MHz W/cm2:  Location:    []  Iontophoresis with dexamethasone         Location: [] Take home patch   [] In clinic    []  Ice     []  heat  []  Ice massage  []  Laser   []  Anodyne Position:  Location:    []  Laser with stim  []  Other:  Position:  Location:   15 [x]  Vasopneumatic Device Pressure:       [] lo [x] med [] hi   Temperature: [x] lo [] med [] hi   [x] Skin assessment post-treatment:  [x]intact [x]redness- no adverse reaction    []redness  adverse reaction:       38 min Therapeutic Exercise:  [x] See flow sheet :      Rationale: increase ROM, increase strength, improve coordination and improve balance to improve the patients ability to ambulate and perform normal ADL's      15 min Manual Therapy: In supine, seated EOB, prone PROM to increase knee flexion and extension and scar massage. Rationale: decrease pain, increase ROM, increase tissue extensibility, decrease edema  and decrease trigger points to (R) knee      With   [] TE   [] TA   [] neuro   [] other: Patient Education: [x] Review HEP    [] Progressed/Changed HEP based on:   [] positioning   [] body mechanics   [] transfers   [] heat/ice application    [] other:      Objective/Functional Measures:  Patient is able to achieve 100 flexion easier today with less pain and less restriction felt with end range with knee flexion    ASSESSMENT/Changes in Function:   Patient will continue to benefit from skilled PT services to modify and progress therapeutic interventions, address functional mobility deficits, address ROM deficits, address strength deficits, analyze and address soft tissue restrictions and instruct in home and community integration to attain remaining goals. [x]  See Plan of Care  []  See progress note/recertification  []  See Discharge Summary         Progress towards goals / Updated goals: · Short Term Goals: To be accomplished in  6  treatments: Independent/compliant with long term HEP for ROM, LE strength. Current: Pt reports she does 3x daily, except on days she has therapy. 4/26/18  Improve knee AROM by 10 degrees to improve functional AROM  Pt will safely progress to cane for short distance with proper gait sequence. Progressing 4/25/18 HLL    · Long Term Goals:  To be accomplished in  12  treatments:  Improve FOTO outcome score by 23% to indicate a significant improvement in ADL function  Pt will be able to climb/descend stairs reciprocally using railing  Pt will progress to functional/efficient cane ambulation for long distance, no device for short distance    PLAN  [x]  Upgrade activities as tolerated     [x]  Continue plan of care  []  Update interventions per flow sheet       []  Discharge due to:_  []  Other:_      Kael Claudia, PTAARGELIA 5/4/2018  10:00 AM    Future Appointments  Date Time Provider Fabiola Flynn   5/7/2018 9:00 AM Kael Claudia, PTA [facility]   5/10/2018 9:30 AM Kavon Anderson DPT [facility]   5/11/2018 10:00 AM Kael Claudia, PTA [facility]   5/14/2018 9:00 AM Kael Claudia, PTA [facility]   5/16/2018 9:00 AM Kael Claudia, PTA [facility]   5/18/2018 10:00 AM Kael Claudia, PTA [facility]   5/21/2018 9:00 AM Kael Claudia, PTA [facility]   5/23/2018 9:00 AM Kael Claudia, PTA [facility]   5/25/2018 10:00 AM Kael Claudia, PTA [facility]   5/29/2018 9:00 AM Kael Claudia, PTA [facility]   5/30/2018 9:00 AM Kael Claudia, PTA [facility]   6/1/2018 10:00 AM Kael Claudia, PTA [facility]

## 2018-05-07 ENCOUNTER — HOSPITAL ENCOUNTER (OUTPATIENT)
Dept: PHYSICAL THERAPY | Age: 73
Discharge: HOME | End: 2018-05-07
Payer: MEDICARE

## 2018-05-07 PROCEDURE — 97140 MANUAL THERAPY 1/> REGIONS: CPT

## 2018-05-07 PROCEDURE — 97014 ELECTRIC STIMULATION THERAPY: CPT

## 2018-05-07 PROCEDURE — 97110 THERAPEUTIC EXERCISES: CPT

## 2018-05-07 NOTE — PROGRESS NOTES
PT DAILY TREATMENT NOTE - [facility]     Patient Name: Shane Blunt  Date:2018  : 1945  [x]  Patient  Verified  Payor: Raina Plan / Plan: [place] MEDICARE PART A & B / Product Type: Medicare /    In time: 9:00   Out time: 10:18  Total Treatment Time (min): 78  Total Timed Codes (min): 58  1:1 Treatment Time ( [address] only):40  Visit #:9  of     Treatment Area: S/P total knee replacement, right [Z96.651]    SUBJECTIVE  Pain Level IN:(0-10 scale): 3/10  Pain Level OUT: (0-10 scale) post treatment: 10    Any medication changes, allergies to medications, adverse drug reactions, diagnosis change, or new procedure performed?: [x] No    [] Yes (see summary sheet for update)  Subjective functional status/changes:   [] No changes reported  I had a nice weekend, but I think that area to the top of the incision site is still draining a bit      OBJECTIVE    Modality rationale: decrease edema, decrease inflammation and decrease pain to improve the patients ability to ambulate without RW   Min Type Additional Details   15+5 set up [x] Estim:  [x]Unatt       [x]IFC  []Premod                        []Other:  [x]w/ice   [x]w/heat  Position: in long sitting  Location: to (R) knee    [] Estim: []Att    []TENS instruct  []NMES                    []Other:  []w/US   []w/ice   []w/heat  Position:  Location:    []  Traction: [] Cervical       []Lumbar                       [] Prone          []Supine                       []Intermittent   []Continuous Lbs:  [] before manual  [] after manual    []  Ultrasound: []Continuous   [] Pulsed                           []1MHz   []3MHz W/cm2:  Location:    []  Iontophoresis with dexamethasone         Location: [] Take home patch   [] In clinic    []  Ice     []  heat  []  Ice massage  []  Laser   []  Anodyne Position:  Location:    []  Laser with stim  []  Other:  Position:  Location:    []  Vasopneumatic Device Pressure:       [] lo [x] med [] hi   Temperature: [x] lo [] med [] hi   [x] Skin assessment post-treatment:  [x]intact [x]redness- no adverse reaction    []redness  adverse reaction:       38 min Therapeutic Exercise:  [x] See flow sheet :   added 2# with terminal extension hangs      Rationale: increase ROM, increase strength, improve coordination and improve balance to improve the patients ability to ambulate and perform normal ADL's      15+5 min Manual Therapy: In supine, seated EOB, prone PROM to increase knee flexion and extension and scar massage. Then cleaned incision site and removed two small pieces of leftover suture from the superior aspect of the incision site and cleaned with saline solution covered with band aid    Rationale: decrease pain, increase ROM, increase tissue extensibility, decrease edema  and decrease trigger points to (R) knee      With   [] TE   [] TA   [] neuro   [] other: Patient Education: [x] Review HEP    [] Progressed/Changed HEP based on:   [] positioning   [] body mechanics   [] transfers   [] heat/ice application    [] other:      Objective/Functional Measures:  Patient is able to achieve 105 flexion in supine with some discomfort at end range  Removed two small remaining sutures to the superior aspect of incision site that was draining min with yellowish/white discharge - no odor  STG#3 MET    ASSESSMENT/Changes in Function:   Patient will continue to benefit from skilled PT services to modify and progress therapeutic interventions, address functional mobility deficits, address ROM deficits, address strength deficits, analyze and address soft tissue restrictions and instruct in home and community integration to attain remaining goals. [x]  See Plan of Care  []  See progress note/recertification  []  See Discharge Summary         Progress towards goals / Updated goals: · Short Term Goals: To be accomplished in  6  treatments: Independent/compliant with long term HEP for ROM, LE strength.    Current: Pt reports she does 3x daily, except on days she has therapy. 4/26/18  Improve knee AROM by 10 degrees to improve functional AROM  Pt will safely progress to cane for short distance with proper gait sequence. MET 5/7/18    · Long Term Goals:  To be accomplished in  12  treatments:  Improve FOTO outcome score by 23% to indicate a significant improvement in ADL function  Pt will be able to climb/descend stairs reciprocally using railing  Pt will progress to functional/efficient cane ambulation for long distance, no device for short distance    PLAN  [x]  Upgrade activities as tolerated     [x]  Continue plan of care  []  Update interventions per flow sheet       []  Discharge due to:_  []  Other:_      Froilan Ramirez, ZORHA, LPTA 5/7/2018  10:00 AM    Future Appointments  Date Time Provider Fabiola Flynn   5/10/2018 9:30 AM Debby Newberry DPT [facility]   5/11/2018 10:00 AM Froilan Ramirez PTA [facility]   5/14/2018 9:00 AM Froilan Ramirez PTA [facility]   5/16/2018 9:00 AM Froilan Ramirez PTA [facility]   5/18/2018 10:00 [facility]   5/21/2018 9:00 AM Froilan Ramirez, PTA [facility]   5/23/2018 9:00 AM Froilan Ramirez PTA [facility]   5/25/2018 10:00 AM Froilan Ramirez, PTA [facility]   5/29/2018 9:00 AM Froilan Ramirez, PTA [facility]   5/30/2018 9:00 AM Froilan Ramirez, PTA [facility]   6/1/2018 10:00 AM Froilan Ramirez, PTA [facility]

## 2018-05-09 ENCOUNTER — APPOINTMENT (OUTPATIENT)
Dept: PHYSICAL THERAPY | Age: 73
End: 2018-05-09
Payer: MEDICARE

## 2018-05-10 ENCOUNTER — APPOINTMENT (OUTPATIENT)
Dept: PHYSICAL THERAPY | Age: 73
End: 2018-05-10
Payer: MEDICARE

## 2018-05-11 ENCOUNTER — HOSPITAL ENCOUNTER (OUTPATIENT)
Dept: PHYSICAL THERAPY | Age: 73
Discharge: HOME | End: 2018-05-11
Payer: MEDICARE

## 2018-05-11 PROCEDURE — G8979 MOBILITY GOAL STATUS: HCPCS | Performed by: PHYSICAL THERAPIST

## 2018-05-11 PROCEDURE — 97014 ELECTRIC STIMULATION THERAPY: CPT

## 2018-05-11 PROCEDURE — G8978 MOBILITY CURRENT STATUS: HCPCS | Performed by: PHYSICAL THERAPIST

## 2018-05-11 PROCEDURE — 97140 MANUAL THERAPY 1/> REGIONS: CPT

## 2018-05-11 PROCEDURE — 97110 THERAPEUTIC EXERCISES: CPT | Performed by: PHYSICAL THERAPIST

## 2018-05-11 NOTE — PROGRESS NOTES
PT DAILY TREATMENT NOTE - [facility]     Patient Name: Tara Norris  Date:2018  : 1945  [x]  Patient  Verified  Payor: Thomas Day / Plan: [place] MEDICARE PART A & B / Product Type: Medicare /    In time: 10:08   Out time: 11:15  Total Treatment Time (min): 67  Total Timed Codes (min): 47  1:1 Treatment Time ( [address] only):30  Visit #:10  of     Treatment Area: S/P total knee replacement, right [Z96.651]    SUBJECTIVE  Pain Level IN:(0-10 scale): 310  Pain Level OUT: (0-10 scale) post treatment: 1/10    Any medication changes, allergies to medications, adverse drug reactions, diagnosis change, or new procedure performed?: [x] No    [] Yes (see summary sheet for update)  Subjective functional status/changes:   [] No changes reported  I would say that overall I am 50% better since starting therapy and having my surgery. I still have a lot of tightness and discomfort.  Having a hard time with walking for a long time because I start to limp and  Climbing stairs is still challenging       OBJECTIVE    Modality rationale: decrease edema, decrease inflammation and decrease pain to improve the patients ability to ambulate without RW   Min Type Additional Details   15+5 set up [x] Estim:  [x]Unatt       [x]IFC  []Premod                        []Other:  [x]w/ice   [x]w/heat  Position: in long sitting  Location: to (R) knee    [] Estim: []Att    []TENS instruct  []NMES                    []Other:  []w/US   []w/ice   []w/heat  Position:  Location:    []  Traction: [] Cervical       []Lumbar                       [] Prone          []Supine                       []Intermittent   []Continuous Lbs:  [] before manual  [] after manual    []  Ultrasound: []Continuous   [] Pulsed                           []1MHz   []3MHz W/cm2:  Location:    []  Iontophoresis with dexamethasone         Location: [] Take home patch   [] In clinic    []  Ice     []  heat  []  Ice massage  []  Laser   []  Anodyne Position:  Location:    [] Laser with stim  []  Other:  Position:  Location:    []  Vasopneumatic Device Pressure:       [] lo [x] med [] hi   Temperature: [x] lo [] med [] hi   [x] Skin assessment post-treatment:  [x]intact [x]redness- no adverse reaction    []redness  adverse reaction:       22 min Therapeutic Exercise:  [x] See flow sheet :   TC with some exercises for reassessment     Rationale: increase ROM, increase strength, improve coordination and improve balance to improve the patients ability to ambulate and perform normal ADL's      15 min Manual Therapy: In supine, seated EOB, prone PROM to increase knee flexion and extension and scar massage. Rationale: decrease pain, increase ROM, increase tissue extensibility, decrease edema  and decrease trigger points to (R) knee      10 With   [x] TE   [] TA   [] neuro   [] other: Patient Education: [x] Review HEP    [] Progressed/Changed HEP based on:   [] positioning   [] body mechanics   [] transfers   [] heat/ice application    [x] other: REASSESSMENT AND FOTO      Objective/Functional Measures:  FOTO score is 72/100 was 32/100 on evaluation  AROM -3 to 100  PROM 0 to 105 with some tightness at end range      ASSESSMENT/Changes in Function:   Patient will continue to benefit from skilled PT services to modify and progress therapeutic interventions, address functional mobility deficits, address ROM deficits, address strength deficits, analyze and address soft tissue restrictions and instruct in home and community integration to attain remaining goals. []  See Plan of Care  [x]  See progress note/recertification  []  See Discharge Summary         Progress towards goals / Updated goals: · Short Term Goals: To be accomplished in  6  treatments: Independent/compliant with long term HEP for ROM, LE strength. Current: Pt reports she does 3x daily, except on days she has therapy.  4/26/18  Improve knee AROM by 10 degrees to improve functional AROM MET 5/11/18  Pt will safely progress to cane for short distance with proper gait sequence. MET 5/7/18    · Long Term Goals:  To be accomplished in  12  treatments:  Improve FOTO outcome score by 23% to indicate a significant improvement in ADL function MET 5/11/18  Pt will be able to climb/descend stairs reciprocally using railing - PARTIAL MET - NOT RECIPROCAL 5/11/18  Pt will progress to functional/efficient cane ambulation for long distance, no device for short distance MET 5/11/18    PLAN  [x]  Upgrade activities as tolerated     [x]  Continue plan of care  []  Update interventions per flow sheet       []  Discharge due to:_  [x]  Other:_  [id number] , ZOHRA, LPLUIS 5/11/2018  10:00 AM    Future Appointments  Date Time Provider Fabiola Flynn   5/14/2018 9:00 AM Georgiana Flores PTA [facility]   5/16/2018 9:00 AM Georgiana Flores PTA [facility]   5/18/2018 10:00 AM Georgiana Flores PTA [facility]   5/21/2018 9:00 AM Georgiana Flores PTA [facility]   5/23/2018 9:00 AM Georgiana Flores PTA [facility]   5/25/2018 10:00 AM Georgiana Flores PTA [facility]   5/29/2018 9:00 AM Georgiana Flores PTA [facility]   5/30/2018 9:00 AM Georgiana Flores PTA [facility]   6/1/2018 10:00 AM Georgiana Flores PTA [facility]

## 2018-05-14 ENCOUNTER — HOSPITAL ENCOUNTER (OUTPATIENT)
Dept: PHYSICAL THERAPY | Age: 73
Discharge: HOME | End: 2018-05-14
Payer: MEDICARE

## 2018-05-14 PROCEDURE — 97014 ELECTRIC STIMULATION THERAPY: CPT

## 2018-05-14 PROCEDURE — 97110 THERAPEUTIC EXERCISES: CPT

## 2018-05-14 PROCEDURE — 97140 MANUAL THERAPY 1/> REGIONS: CPT

## 2018-05-14 NOTE — PROGRESS NOTES
[address] [facility] AT [facility]  [address] [place] [address],[address], Lety jerome, Ankit Mccauley  Phone [phone]  Fax [phone]  PROGRESS NOTE  Patient Name: Sanchez Cooney : 1945   Treatment/Medical Diagnosis: S/P total knee replacement, right [Z96.651]   Referral Source: Julissa Daugherty MD     Date of Initial Visit: 18 Attended Visits: 10 Missed Visits: 0     SUMMARY OF TREATMENT  Pt seen for R TKR on 3/19/18 for 10 therapy visits. Therapy included therex for strengthening/stabillity, manual to improve ROM and tissue extensibility, and modalities for pain management. CURRENT STATUS  Pt reports 50% better since starting therapy and having my surgery. She notes that she has tightness in the R knee. Functional limitations include prolonged ambulation and stair negotiation. Upon reassessment, pt ambulates without AD. A/PROM of the R knee -3/0 to 100/105 deg. She is able to climb steps with a step a step too pattern. Pt would benefit from a course of skilled PT to further address ROM and strength goals to improve functional transfers and stairs to maximize walking tolerance and return to PLOF symptom free. Goal/Measure of Progress Goal Met? 1. Independent/compliant with long term HEP for ROM, LE strength. Current: Pt reports she does 3x daily, except on days she has therapy. 18  2. Improve knee AROM by 10 degrees to improve functional AROM MET 18  Pt will safely progress to cane for short distance with proper gait sequence. MET 18     · Long Term Goals: To be accomplished in  12  treatments:1.  1. Improve FOTO outcome score by 23% to indicate a significant improvement in ADL function MET 18  2. Pt will be able to climb/descend stairs reciprocally using railing - PARTIAL MET - NOT RECIPROCAL 18  3.  Pt will progress to functional/efficient cane ambulation for long distance, no device for short distance MET 18     New Goals to be achieved in __10__  treatments:  1. Pt will improve R knee AROM 0-120 deg to improve stair negotiation. 2.  Pt will be able to perform 6\" step down without restrictions to help descend stairs. G-Codes: Mobility: [id number] Current  CJ= 20-39%   Goal  CK= 40-59%. The severity rating is based on the FOTO Score    RECOMMENDATIONS  Continue PT 2-3x/week for 10 treatments to progress towards updated LTGs. If you have any questions/comments please contact us directly at [phone]. Thank you for allowing us to assist in the care of your patient. Therapist Signature: Yuki Lou DPT Date: 5/14/2018     Time: 3:23 PM   NOTE TO PHYSICIAN:  PLEASE COMPLETE THE ORDERS BELOW AND FAX TO   [facility]: [phone]. If you are unable to process this request in 24 hours please contact our office: [phone].    ___ I have read the above report and request that my patient continue as recommended.   ___ I have read the above report and request that my patient continue therapy with the following changes/special instructions:_________________________________________________________   ___ I have read the above report and request that my patient be discharged from therapy.      Physician Signature:        Date:       Time:

## 2018-05-14 NOTE — PROGRESS NOTES
PT DAILY TREATMENT NOTE - [facility]     Patient Name: Lam Gramajo  Date:2018  : 1945  [x]  Patient  Verified  Payor: Florin Fuentes / Plan: [place] MEDICARE PART A & B / Product Type: Medicare /    In time: 9:00   Out time: 10:15  Total Treatment Time (min): 75  Total Timed Codes (min): 55  1:1 Treatment Time [facility] only):30  Visit #:1  of 10    Treatment Area: S/P total knee replacement, right [Z96.651]    SUBJECTIVE  Pain Level IN:(0-10 scale): 2/10  Pain Level OUT: (0-10 scale) post treatment: 1/10    Any medication changes, allergies to medications, adverse drug reactions, diagnosis change, or new procedure performed?: [x] No    [] Yes (see summary sheet for update)  Subjective functional status/changes:   [] No changes reported   I had a better weekend -  I was able to go to [employer] and out to lunch - I did much better than last time.     OBJECTIVE    Modality rationale: decrease edema, decrease inflammation and decrease pain to improve the patients ability to ambulate without RW   Min Type Additional Details   15+5 set up [x] Estim:  [x]Unatt       [x]IFC  []Premod                        []Other:  [x]w/ice   [x]w/heat  Position: in long sitting  Location: to (R) knee    [] Estim: []Att    []TENS instruct  []NMES                    []Other:  []w/US   []w/ice   []w/heat  Position:  Location:    []  Traction: [] Cervical       []Lumbar                       [] Prone          []Supine                       []Intermittent   []Continuous Lbs:  [] before manual  [] after manual    []  Ultrasound: []Continuous   [] Pulsed                           []1MHz   []3MHz W/cm2:  Location:    []  Iontophoresis with dexamethasone         Location: [] Take home patch   [] In clinic    []  Ice     []  heat  []  Ice massage  []  Laser   []  Anodyne Position:  Location:    []  Laser with stim  []  Other:  Position:  Location:    []  Vasopneumatic Device Pressure:       [] lo [x] med [] hi   Temperature: [x] lo [] med [] hi [x] Skin assessment post-treatment:  [x]intact [x]redness- no adverse reaction    []redness  adverse reaction:       40 min Therapeutic Exercise:  [x] See flow sheet :      Rationale: increase ROM, increase strength, improve coordination and improve balance to improve the patients ability to ambulate and perform normal ADL's      15 min Manual Therapy: In supine, seated EOB, prone PROM to increase knee flexion and extension and scar massage. Rationale: decrease pain, increase ROM, increase tissue extensibility, decrease edema  and decrease trigger points to (R) knee       With   [] TE   [] TA   [] neuro   [] other: Patient Education: [x] Review HEP    [] Progressed/Changed HEP based on:   [] positioning   [] body mechanics   [] transfers   [] heat/ice application    [] other     Objective/Functional Measures:  Progressing well with increasing endurance and tolerance with outside activities towards new goals stated below    ASSESSMENT/Changes in Function:   Patient will continue to benefit from skilled PT services to modify and progress therapeutic interventions, address functional mobility deficits, address ROM deficits, address strength deficits, analyze and address soft tissue restrictions and instruct in home and community integration to attain remaining goals. []  See Plan of Care  [x]  See progress note/recertification  []  See Discharge Summary         Progress towards goals / Updated goals: 5/11/18   New Goals to be achieved in __10__  treatments:  1. Pt will improve R knee AROM 0-120 deg to improve stair negotiation.    2.  Pt will be able to perform 6\" step down without restrictions to help descend stairs.           PLAN  [x]  Upgrade activities as tolerated     [x]  Continue plan of care  []  Update interventions per flow sheet       []  Discharge due to:_  []  Other:_      Heather Gaffney PTA, LPTA 5/14/2018  10:00 AM    Future Appointments  Date Time Provider Fabiola Flynn   5/16/2018 9:00 AM Adrián Eason, PTA [facility]   5/18/2018 10:00 AM Adrián Eason, PTA [facility]   5/21/2018 9:00 AM Adrián Eason, PTA [facility]   5/23/2018 9:00 AM Adrián Eason, PTA [facility]   5/25/2018 10:00 AM Adrián Eason, PTA [facility]   5/29/2018 9:00 AM Adrián Eason, PTA [facility]   5/30/2018 9:00 AM Adrián Eason, PTA [facility]   6/1/2018 10:00 AM Adrián Eason, PTA [facility]

## 2018-05-16 ENCOUNTER — HOSPITAL ENCOUNTER (OUTPATIENT)
Dept: PHYSICAL THERAPY | Age: 73
Discharge: HOME | End: 2018-05-16
Payer: MEDICARE

## 2018-05-16 PROCEDURE — 97110 THERAPEUTIC EXERCISES: CPT

## 2018-05-16 PROCEDURE — 97140 MANUAL THERAPY 1/> REGIONS: CPT

## 2018-05-16 PROCEDURE — 97016 VASOPNEUMATIC DEVICE THERAPY: CPT

## 2018-05-16 NOTE — PROGRESS NOTES
PT DAILY TREATMENT NOTE - [facility]     Patient Name: Calvin Tran  Date:2018  : 1945  [x]  Patient  Verified  Payor: Nam Pore / Plan: [place] MEDICARE PART A & B / Product Type: Medicare /    In time: 9:00  Out time: 10:10  Total Treatment Time (min): 70  Total Timed Codes (min): 50  1:1 Treatment Time [facility] only):30  Visit #:2  of 10    Treatment Area: S/P total knee replacement, right [Z96.651]    SUBJECTIVE  Pain Level IN:(0-10 scale): 2/10  Pain Level OUT: (0-10 scale) post treatment: 0/10    Any medication changes, allergies to medications, adverse drug reactions, diagnosis change, or new procedure performed?: [x] No    [] Yes (see summary sheet for update)  Subjective functional status/changes:   [] No changes reported   I am so excited that I was able to ride the bike today like normally     OBJECTIVE    Modality rationale: decrease edema, decrease inflammation and decrease pain to improve the patients ability to ambulate without RW   Min Type Additional Details   15+5 set up [x] Estim:  [x]Unatt       [x]IFC  []Premod                        []Other:  [x]w/ice   [x]w/heat  Position: in long sitting  Location: to (R) knee    [] Estim: []Att    []TENS instruct  []NMES                    []Other:  []w/US   []w/ice   []w/heat  Position:  Location:    []  Traction: [] Cervical       []Lumbar                       [] Prone          []Supine                       []Intermittent   []Continuous Lbs:  [] before manual  [] after manual    []  Ultrasound: []Continuous   [] Pulsed                           []1MHz   []3MHz W/cm2:  Location:    []  Iontophoresis with dexamethasone         Location: [] Take home patch   [] In clinic    []  Ice     []  heat  []  Ice massage  []  Laser   []  Anodyne Position:  Location:    []  Laser with stim  []  Other:  Position:  Location:    []  Vasopneumatic Device Pressure:       [] lo [x] med [] hi   Temperature: [x] lo [] med [] hi   [x] Skin assessment post-treatment: [x]intact [x]redness- no adverse reaction    []redness  adverse reaction:       35 min Therapeutic Exercise:  [x] See flow sheet :      Rationale: increase ROM, increase strength, improve coordination and improve balance to improve the patients ability to ambulate and perform normal ADL's      15 min Manual Therapy: In supine, seated EOB, prone PROM to increase knee flexion and extension and scar massage. Rationale: decrease pain, increase ROM, increase tissue extensibility, decrease edema  and decrease trigger points to (R) knee       With   [] TE   [] TA   [] neuro   [] other: Patient Education: [x] Review HEP    [] Progressed/Changed HEP based on:   [] positioning   [] body mechanics   [] transfers   [] heat/ice application    [] other     Objective/Functional Measures: In supine 107 flexion in supine with overpressure    ASSESSMENT/Changes in Function:   Patient will continue to benefit from skilled PT services to modify and progress therapeutic interventions, address functional mobility deficits, address ROM deficits, address strength deficits, analyze and address soft tissue restrictions and instruct in home and community integration to attain remaining goals. []  See Plan of Care  [x]  See progress note/recertification  []  See Discharge Summary         Progress towards goals / Updated goals: 5/11/18   New Goals to be achieved in __10__  treatments:  1. Pt will improve R knee AROM 0-120 deg to improve stair negotiation.    2.  Pt will be able to perform 6\" step down without restrictions to help descend stairs.           PLAN  [x]  Upgrade activities as tolerated     [x]  Continue plan of care  []  Update interventions per flow sheet       []  Discharge due to:_  []  Other:_      Deborah Rosales PTA, LPTA 5/16/2018  10:00 AM    Future Appointments  Date Time Provider Fabiola Flynn   5/16/2018 9:00 AM Deborah Rosales PTA [facility]   5/18/2018 10:00 AM Deborah Rosales PTA [facility] 5/21/2018 9:00 AM Candy Mora PTA [facility]   5/23/2018 9:00 AM Candy Mora PTA [facility]   5/25/2018 10:00 AM Candy Mora PTA [facility]   5/29/2018 9:00 AM Candy Mora PTA [facility]   5/30/2018 9:00 AM Candy Mora PTA [facility]   6/1/2018 10:00 AM Everlean Mcardle, PT [facility]

## 2018-05-18 ENCOUNTER — HOSPITAL ENCOUNTER (OUTPATIENT)
Dept: PHYSICAL THERAPY | Age: 73
Discharge: HOME | End: 2018-05-18
Payer: MEDICARE

## 2018-05-18 PROCEDURE — 97140 MANUAL THERAPY 1/> REGIONS: CPT | Performed by: PHYSICAL THERAPIST

## 2018-05-18 PROCEDURE — 97014 ELECTRIC STIMULATION THERAPY: CPT | Performed by: PHYSICAL THERAPIST

## 2018-05-18 PROCEDURE — 97110 THERAPEUTIC EXERCISES: CPT | Performed by: PHYSICAL THERAPIST

## 2018-05-18 NOTE — PROGRESS NOTES
PT DAILY TREATMENT NOTE - [facility]     Patient Name: Andrea Weiner  Date:2018  : 1945  [x]  Patient  Verified  Payor: VA MEDICARE / Plan: VA MEDICARE PART A & B / Product Type: Medicare /    In time: 1004  Out time: 11:18am  Total Treatment Time (min): 74  Total Timed Codes (min): 69  1:1 Treatment Time [facility] only):30  Visit #:2  of 10    Treatment Area: S/P total knee replacement, right [Z96.651]    SUBJECTIVE  Pain Level IN:(0-10 scale): 2/10  Pain Level OUT: (0-10 scale) post treatment: 2/10    Any medication changes, allergies to medications, adverse drug reactions, diagnosis change, or new procedure performed?: [x] No    [] Yes (see summary sheet for update)  Subjective functional status/changes:   [] No changes reported  I had the last few stitches taken out of top of scar.     OBJECTIVE    Modality rationale: decrease edema, decrease inflammation and decrease pain to improve the patients ability to ambulate without RW   Min Type Additional Details   15 set up [x] Estim:  [x]Unatt       [x]IFC  []Premod                        []Other:  [x]w/ice   [x]w/heat  Position: in long sitting  Location: to (R) knee    [] Estim: []Att    []TENS instruct  []NMES                    []Other:  []w/US   []w/ice   []w/heat  Position:  Location:    []  Traction: [] Cervical       []Lumbar                       [] Prone          []Supine                       []Intermittent   []Continuous Lbs:  [] before manual  [] after manual    []  Ultrasound: []Continuous   [] Pulsed                           []1MHz   []3MHz W/cm2:  Location:    []  Iontophoresis with dexamethasone         Location: [] Take home patch   [] In clinic    []  Ice     []  heat  []  Ice massage  []  Laser   []  Anodyne Position:  Location:    []  Laser with stim  []  Other:  Position:  Location:    []  Vasopneumatic Device Pressure:       [] lo [x] med [] hi   Temperature: [x] lo [] med [] hi   [x] Skin assessment post-treatment:  [x]intact [x]redness- no adverse reaction    []redness  adverse reaction:       44/15 min Therapeutic Exercise:  [x] See flow sheet :      Rationale: increase ROM, increase strength, improve coordination and improve balance to improve the patients ability to ambulate and perform normal ADL's      15 min Manual Therapy: In supine, seated EOB, supine PROM to increase knee flexion and extension and scar massage. , extension mobes in supine     Rationale: decrease pain, increase ROM, increase tissue extensibility, decrease edema  and decrease trigger points to (R) knee       With   [] TE   [] TA   [] neuro   [] other: Patient Education: [x] Review HEP    [] Progressed/Changed HEP based on:   [] positioning   [] body mechanics   [] transfers   [] heat/ice application    [] other     Objective/Functional Measures:  Supine PROM:0deg ext after mobes, 105deg flexion PROM  Mild swelling noted in R knee, B ankle swelling noted    ASSESSMENT/Changes in Function  Continue to work on tKE as pat lacks last few degrees, able to achieve 0deg with manual PROM. Educated in scar massage now that all stitches out. Patient will continue to benefit from skilled PT services to modify and progress therapeutic interventions, address functional mobility deficits, address ROM deficits, address strength deficits, analyze and address soft tissue restrictions and instruct in home and community integration to attain remaining goals. []  See Plan of Care  [x]  See progress note/recertification  []  See Discharge Summary         Progress towards goals / Updated goals: 5/11/18   New Goals to be achieved in __10__  treatments:  1. Pt will improve R knee AROM 0-120 deg to improve stair negotiation. 2.  Pt will be able to perform 6\" step down without restrictions to help descend stairs.  Patient progressing , continues with step to pattern 5/18/18        PLAN  [x]  Upgrade activities as tolerated     [x]  Continue plan of care  []  Update interventions per flow sheet       []  Discharge due to:_  []  Other:_      Milena Sanders PT,  5/18/2018  10:00 AM    Future Appointments  Date Time Provider Fabiola Flynn   5/21/2018 9:00 AM Yasmin Harrison PTA [facility]   5/23/2018 9:00 AM Yasmin Harrison, PTA [facility]   5/25/2018 10:00 AM Yasmin Harrison PTA [facility]   5/29/2018 9:00 AM Yasmin Harrison PTA [facility]   5/30/2018 9:00 AM Yasmin Harrison PTA [facility]   6/1/2018 10:00 AM Ella Holliday PT [facility]

## 2018-05-21 ENCOUNTER — HOSPITAL ENCOUNTER (OUTPATIENT)
Dept: PHYSICAL THERAPY | Age: 73
Discharge: HOME | End: 2018-05-21
Payer: MEDICARE

## 2018-05-21 PROCEDURE — 97140 MANUAL THERAPY 1/> REGIONS: CPT

## 2018-05-21 PROCEDURE — 97014 ELECTRIC STIMULATION THERAPY: CPT

## 2018-05-21 PROCEDURE — 97110 THERAPEUTIC EXERCISES: CPT

## 2018-05-21 NOTE — PROGRESS NOTES
PT DAILY TREATMENT NOTE - [facility]     Patient Name: Jimmy Keys  Date:2018  : 1945  [x]  Patient  Verified  Payor: VA MEDICARE / Plan: VA MEDICARE PART A & B / Product Type: Medicare /    In time: 9:03  Out time: 10:10  Total Treatment Time (min): 67  Total Timed Codes (min): 52  1:1 Treatment Time [facility] only):30  Visit #:4  of 10    Treatment Area: S/P total knee replacement, right [Z96.651]    SUBJECTIVE  Pain Level IN:(0-10 scale): 3/10  Pain Level OUT: (0-10 scale) post treatment: 1/10    Any medication changes, allergies to medications, adverse drug reactions, diagnosis change, or new procedure performed?: [x] No    [] Yes (see summary sheet for update)  Subjective functional status/changes:   [] No changes reported  I just don't understand why it is always so stiff especially with I walk on it - I have forgotten how to walk normal on this leg.  It doesn't hurt it is just so stiff    OBJECTIVE    Modality rationale: decrease edema, decrease inflammation and decrease pain to improve the patients ability to ambulate without RW   Min Type Additional Details   15 [x] Estim:  [x]Unatt       [x]IFC  []Premod                        []Other:  [x]w/ice   [x]w/heat  Position: in long sitting  Location: to (R) knee    [] Estim: []Att    []TENS instruct  []NMES                    []Other:  []w/US   []w/ice   []w/heat  Position:  Location:    []  Traction: [] Cervical       []Lumbar                       [] Prone          []Supine                       []Intermittent   []Continuous Lbs:  [] before manual  [] after manual    []  Ultrasound: []Continuous   [] Pulsed                           []1MHz   []3MHz W/cm2:  Location:    []  Iontophoresis with dexamethasone         Location: [] Take home patch   [] In clinic    []  Ice     []  heat  []  Ice massage  []  Laser   []  Anodyne Position:  Location:    []  Laser with stim  []  Other:  Position:  Location:    []  Vasopneumatic Device Pressure:       [] lo [x] med [] hi   Temperature: [x] lo [] med [] hi   [x] Skin assessment post-treatment:  [x]intact [x]redness- no adverse reaction    []redness  adverse reaction:       37 min Therapeutic Exercise:  [x] See flow sheet :      Rationale: increase ROM, increase strength, improve coordination and improve balance to improve the patients ability to ambulate and perform normal ADL's      15 min Manual Therapy: In supine, seated EOB, supine PROM to increase knee flexion and extension and scar massage. , extension mobes in supine     Rationale: decrease pain, increase ROM, increase tissue extensibility, decrease edema  and decrease trigger points to (R) knee       With   [] TE   [] TA   [] neuro   [] other: Patient Education: [x] Review HEP    [] Progressed/Changed HEP based on:   [] positioning   [] body mechanics   [] transfers   [] heat/ice application    [] other     Objective/Functional Measures:  Patient was able to achieve 110 PASSIVE, 100 active and 105 AAROM in supine with only some end range tightness felt at 110   Stressed the importance as well as practiced walking with an increase speed, decrease prolonged time spent on the (R) knee with ambulation causing increase reports of stiffness and an antalgic gait pattern - but to increase speed to assist  Patient to walk with a with normalizing gait pattern     ASSESSMENT/Changes in Function  Continue to work on tKE as pat lacks last few degrees, able to achieve 0deg with manual PROM. Educated in scar massage now that all stitches out. Patient will continue to benefit from skilled PT services to modify and progress therapeutic interventions, address functional mobility deficits, address ROM deficits, address strength deficits, analyze and address soft tissue restrictions and instruct in home and community integration to attain remaining goals.      []  See Plan of Care  [x]  See progress note/recertification  []  See Discharge Summary         Progress towards goals / Updated goals: 5/11/18   New Goals to be achieved in __10__  treatments:  1. Pt will improve R knee AROM 0-120 deg to improve stair negotiation. 2.  Pt will be able to perform 6\" step down without restrictions to help descend stairs.  Patient progressing , continues with step to pattern 5/18/18        PLAN  [x]  Upgrade activities as tolerated     [x]  Continue plan of care  []  Update interventions per flow sheet       []  Discharge due to:_  []  Other:_      Sofia Mclaughlin PTA,  5/21/2018  10:00 AM    Future Appointments  Date Time Provider Fabiola Flynn   5/23/2018 9:00 AM Sofia Mclaughlin PTA [facility]   5/25/2018 10:00 AM Sofia Mclaughlin PTA [facility]   5/29/2018 9:00 AM Sofia Mclaughlin PTA [facility]   5/30/2018 9:00 AM Sofia Mclaughlin PTA [facility]   6/1/2018 10:00 AM Maurice Neil, PT [facility]

## 2018-05-23 ENCOUNTER — HOSPITAL ENCOUNTER (OUTPATIENT)
Dept: PHYSICAL THERAPY | Age: 73
Discharge: HOME | End: 2018-05-23
Payer: MEDICARE

## 2018-05-23 PROCEDURE — 97014 ELECTRIC STIMULATION THERAPY: CPT

## 2018-05-23 PROCEDURE — 97110 THERAPEUTIC EXERCISES: CPT

## 2018-05-23 PROCEDURE — 97140 MANUAL THERAPY 1/> REGIONS: CPT

## 2018-05-23 NOTE — PROGRESS NOTES
PT DAILY TREATMENT NOTE - [facility]     Patient Name: Chip Valenzuela  Date:2018  : 1945  [x]  Patient  Verified  Payor: [employer] / Plan: [place] MEDICARE PART A & B / Product Type: Medicare /    In time: 9:03  Out time:10:10  Total Treatment Time (min): 67  Total Timed Codes (min): 42  1:1 Treatment Time [facility] only):42  Visit #:5  of 10    Treatment Area: S/P total knee replacement, right [Z96.651]    SUBJECTIVE  Pain Level IN:(0-10 scale): 210  Pain Level OUT: (0-10 scale) post treatment:10    Any medication changes, allergies to medications, adverse drug reactions, diagnosis change, or new procedure performed?: [x] No    [] Yes (see summary sheet for update)  Subjective functional status/changes:   [] No changes reported  I am a little bit sore today but I have been up early because my granddaughter had a baby this morning at 2 am    OBJECTIVE    Modality rationale: decrease edema, decrease inflammation and decrease pain to improve the patients ability to ambulate without RW   Min Type Additional Details   15 [x] Estim:  [x]Unatt       [x]IFC  []Premod                        []Other:  [x]w/ice   [x]w/heat  Position: in long sitting  Location: to (R) knee    [] Estim: []Att    []TENS instruct  []NMES                    []Other:  []w/US   []w/ice   []w/heat  Position:  Location:    []  Traction: [] Cervical       []Lumbar                       [] Prone          []Supine                       []Intermittent   []Continuous Lbs:  [] before manual  [] after manual    []  Ultrasound: []Continuous   [] Pulsed                           []1MHz   []3MHz W/cm2:  Location:    []  Iontophoresis with dexamethasone         Location: [] Take home patch   [] In clinic    []  Ice     []  heat  []  Ice massage  []  Laser   []  Anodyne Position:  Location:    []  Laser with stim  []  Other:  Position:  Location:    []  Vasopneumatic Device Pressure:       [] lo [x] med [] hi   Temperature: [x] lo [] med [] hi   [x] Skin assessment post-treatment:  [x]intact [x]redness- no adverse reaction    []redness  adverse reaction:       37/27 min Therapeutic Exercise:  [x] See flow sheet : 10 min NC for warm up on the bike       Rationale: increase ROM, increase strength, improve coordination and improve balance to improve the patients ability to ambulate and perform normal ADL's      15 min Manual Therapy: In supine, seated EOB, supine PROM to increase knee flexion and extension and scar massage. , extension mobes in supine     Rationale: decrease pain, increase ROM, increase tissue extensibility, decrease edema  and decrease trigger points to (R) knee       With   [] TE   [] TA   [] neuro   [] other: Patient Education: [x] Review HEP    [] Progressed/Changed HEP based on:   [] positioning   [] body mechanics   [] transfers   [] heat/ice application    [] other     Objective/Functional Measures:  Continue with increasing strength, normalizing gait pattern and ROM - patient presents with 110 with flexion- progressing towards LTG#1    ASSESSMENT/Changes in Function    Patient will continue to benefit from skilled PT services to modify and progress therapeutic interventions, address functional mobility deficits, address ROM deficits, address strength deficits, analyze and address soft tissue restrictions and instruct in home and community integration to attain remaining goals. []  See Plan of Care  [x]  See progress note/recertification  []  See Discharge Summary         Progress towards goals / Updated goals: 5/11/18   New Goals to be achieved in __10__  treatments:  1. Pt will improve R knee AROM 0-120 deg to improve stair negotiation. PROGRESSING 5/23/18 110 FLEXION    2. Pt will be able to perform 6\" step down without restrictions to help descend stairs.  Patient progressing , continues with step to pattern 5/18/18        PLAN  [x]  Upgrade activities as tolerated     [x]  Continue plan of care  []  Update interventions per flow sheet       []  Discharge due to:_  []  Other:_      Nubia Gil PTA,  5/23/2018  10:00 AM    Future Appointments  Date Time Provider Fabiola Flynn   5/25/2018 3:00 PM Chriss Primrose, PT [place] [address] [address]   5/29/2018 9:00 AM Nubia Gil PTA [place] [address] [address]   5/30/2018 9:00 AM Nubia Gil PTA [place] [address] [address]   6/1/2018 10:00 AM Chriss Primrose, PT [place] [address] [address]

## 2018-05-25 ENCOUNTER — APPOINTMENT (OUTPATIENT)
Dept: PHYSICAL THERAPY | Age: 73
End: 2018-05-25
Payer: MEDICARE

## 2018-05-29 ENCOUNTER — HOSPITAL ENCOUNTER (OUTPATIENT)
Dept: PHYSICAL THERAPY | Age: 73
Discharge: HOME | End: 2018-05-29
Payer: MEDICARE

## 2018-05-29 PROCEDURE — 97110 THERAPEUTIC EXERCISES: CPT

## 2018-05-29 PROCEDURE — 97014 ELECTRIC STIMULATION THERAPY: CPT

## 2018-05-29 PROCEDURE — 97140 MANUAL THERAPY 1/> REGIONS: CPT

## 2018-05-29 NOTE — PROGRESS NOTES
PT DAILY TREATMENT NOTE - [facility]     Patient Name: Melanie Castellon  Date:2018  : 1945  [x]  Patient  Verified  Payor: Aspen Mccauley / Plan: [place] MEDICARE PART A & B / Product Type: Medicare /    In time:  9:10 Out time:10:20  Total Treatment Time (min): 70  Total Timed Codes (min): 45  1:1 Treatment Time [facility] only):30  Visit #:6  of 10    Treatment Area: S/P total knee replacement, right [Z96.651]    SUBJECTIVE  Pain Level IN:(0-10 scale): 3/10  Pain Level OUT: (0-10 scale) post treatment:1/10    Any medication changes, allergies to medications, adverse drug reactions, diagnosis change, or new procedure performed?: [x] No    [] Yes (see summary sheet for update)  Subjective functional status/changes:   [] No changes reported  I saw the doctor last week and he told me that if my knee does not get better in 3 weeks then he may have do something    OBJECTIVE    Modality rationale: decrease edema, decrease inflammation and decrease pain to improve the patients ability to ambulate without RW   Min Type Additional Details   15 [x] Estim:  [x]Unatt       [x]IFC  []Premod                        []Other:  [x]w/ice   [x]w/heat  Position: in long sitting  Location: to (R) knee    [] Estim: []Att    []TENS instruct  []NMES                    []Other:  []w/US   []w/ice   []w/heat  Position:  Location:    []  Traction: [] Cervical       []Lumbar                       [] Prone          []Supine                       []Intermittent   []Continuous Lbs:  [] before manual  [] after manual    []  Ultrasound: []Continuous   [] Pulsed                           []1MHz   []3MHz W/cm2:  Location:    []  Iontophoresis with dexamethasone         Location: [] Take home patch   [] In clinic    []  Ice     []  heat  []  Ice massage  []  Laser   []  Anodyne Position:  Location:    []  Laser with stim  []  Other:  Position:  Location:    []  Vasopneumatic Device Pressure:       [] lo [x] med [] hi   Temperature: [x] lo [] med [] hi [x] Skin assessment post-treatment:  [x]intact [x]redness- no adverse reaction    []redness  adverse reaction:       40/30 min Therapeutic Exercise:  [x] See flow sheet : 10 min NC for warm up on the bike and stretching       Rationale: increase ROM, increase strength, improve coordination and improve balance to improve the patients ability to ambulate and perform normal ADL's      15 min Manual Therapy: In supine, seated EOB, supine PROM to increase knee flexion and extension and scar massage. , extension mobes in supine     Rationale: decrease pain, increase ROM, increase tissue extensibility, decrease edema  and decrease trigger points to (R) knee       With   [] TE   [] TA   [] neuro   [] other: Patient Education: [x] Review HEP    [] Progressed/Changed HEP based on:   [] positioning   [] body mechanics   [] transfers   [] heat/ice application    [] other     Objective/Functional Measures: PROM 112 AAROM in supine       Patient is concerned regarding possible manipulation secondary to MD's last visit- however patient presents with 112 in supine with AAROM - patient does continue to ambulate with an antalgic gait pattern with prolonged single leg stance on (R) knee and decreased knee flexion with ambulation      ASSESSMENT/Changes in Function    Patient will continue to benefit from skilled PT services to modify and progress therapeutic interventions, address functional mobility deficits, address ROM deficits, address strength deficits, analyze and address soft tissue restrictions and instruct in home and community integration to attain remaining goals. []  See Plan of Care  [x]  See progress note/recertification  []  See Discharge Summary         Progress towards goals / Updated goals: 5/11/18   New Goals to be achieved in __10__  treatments:  1. Pt will improve R knee AROM 0-120 deg to improve stair negotiation. PROGRESSING 5/23/18 110 FLEXION    2.   Pt will be able to perform 6\" step down without restrictions to help descend stairs.  Patient progressing , continues with step to pattern 5/18/18        PLAN  [x]  Upgrade activities as tolerated     [x]  Continue plan of care  []  Update interventions per flow sheet       []  Discharge due to:_  []  Other:_      Cristiano Huitron, ZOHRA,  5/29/2018  10:00 AM    Future Appointments  Date Time Provider Fabiola Flynn   5/30/2018 9:00 AM Cristiano Huitron, PTA [facility]   6/1/2018 2:00 PM Sandi Mcknight, PT [facility]   6/4/2018 7:30 AM Cristiano Board, PTA [facility]   6/6/2018 9:00 AM Cristiano Board, PTA [facility]   6/8/2018 9:00 AM Cristiano Board, PTA [facility]   6/11/2018 9:00 AM Cristiano Board, PTA [facility]   6/13/2018 9:00 AM Cristiano Board, PTA [facility]   6/15/2018 9:00 AM Cristiano Board, PTA [facility]   6/18/2018 9:00 AM Cristiano Board, PTA [facility]   6/20/2018 9:00 AM Cristiano Board, PTA [facility]   6/22/2018 9:00 AM Sandi Mcknight, PT [facility]   6/25/2018 9:00 AM Cristiano Board, PTA [facility]   6/27/2018 9:00 AM Cristiano Board, PTA [facility]   6/29/2018 9:00 AM Cristiano Board, PTA [facility]

## 2018-05-30 ENCOUNTER — HOSPITAL ENCOUNTER (OUTPATIENT)
Dept: PHYSICAL THERAPY | Age: 73
Discharge: HOME | End: 2018-05-30
Payer: MEDICARE

## 2018-05-30 PROCEDURE — 97140 MANUAL THERAPY 1/> REGIONS: CPT

## 2018-05-30 PROCEDURE — 97110 THERAPEUTIC EXERCISES: CPT

## 2018-05-30 PROCEDURE — 97014 ELECTRIC STIMULATION THERAPY: CPT

## 2018-05-30 NOTE — PROGRESS NOTES
PT DAILY TREATMENT NOTE - [facility]     Patient Name: Tara Norris  Date:2018  : 1945  [x]  Patient  Verified  Payor: [place] MEDICARE / Plan: [place] MEDICARE PART A & B / Product Type: Medicare /    In time:  9:05 Out time: 10:05  Total Treatment Time (min): 60  Total Timed Codes (min): 45  1:1 Treatment Time [facility] only):30  Visit #:7  of 10    Treatment Area: S/P total knee replacement, right [Z96.651]    SUBJECTIVE  Pain Level IN:(0-10 scale): 3/10  Pain Level OUT: (0-10 scale) post treatment:1/10    Any medication changes, allergies to medications, adverse drug reactions, diagnosis change, or new procedure performed?: [x] No    [] Yes (see summary sheet for update)  Subjective functional status/changes:   [] No changes reported  I was worn out yesterday after I left here and had to take a nap     OBJECTIVE    Modality rationale: decrease edema, decrease inflammation and decrease pain to improve the patients ability to ambulate without RW   Min Type Additional Details   15 [x] Estim:  [x]Unatt       [x]IFC  []Premod                        []Other:  [x]w/ice   [x]w/heat  Position: in long sitting  Location: to (R) knee    [] Estim: []Att    []TENS instruct  []NMES                    []Other:  []w/US   []w/ice   []w/heat  Position:  Location:    []  Traction: [] Cervical       []Lumbar                       [] Prone          []Supine                       []Intermittent   []Continuous Lbs:  [] before manual  [] after manual    []  Ultrasound: []Continuous   [] Pulsed                           []1MHz   []3MHz W/cm2:  Location:    []  Iontophoresis with dexamethasone         Location: [] Take home patch   [] In clinic    []  Ice     []  heat  []  Ice massage  []  Laser   []  Anodyne Position:  Location:    []  Laser with stim  []  Other:  Position:  Location:    []  Vasopneumatic Device Pressure:       [] lo [x] med [] hi   Temperature: [x] lo [] med [] hi   [x] Skin assessment post-treatment:  [x]intact [x]redness- no adverse reaction    []redness  adverse reaction:       30/20 min Therapeutic Exercise:  [x] See flow sheet : 10 min NC for warm up on the bike and stretching  Added 8\" step up - front and lateral       Rationale: increase ROM, increase strength, improve coordination and improve balance to improve the patients ability to ambulate and perform normal ADL's      15 min Manual Therapy: In supine, seated EOB, supine PROM to increase knee flexion and extension and scar massage. , extension mobes in supine     Rationale: decrease pain, increase ROM, increase tissue extensibility, decrease edema  and decrease trigger points to (R) knee       With   [] TE   [] TA   [] neuro   [] other: Patient Education: [x] Review HEP    [] Progressed/Changed HEP based on:   [] positioning   [] body mechanics   [] transfers   [] heat/ice application    [] other     Objective/Functional Measures: PROM 115                          Continue with POC towards goals stated below                Demonstrated good strength with step ups      ASSESSMENT/Changes in Function    Patient will continue to benefit from skilled PT services to modify and progress therapeutic interventions, address functional mobility deficits, address ROM deficits, address strength deficits, analyze and address soft tissue restrictions and instruct in home and community integration to attain remaining goals. []  See Plan of Care  [x]  See progress note/recertification  []  See Discharge Summary         Progress towards goals / Updated goals: 5/11/18   New Goals to be achieved in __10__  treatments:  1. Pt will improve R knee AROM 0-120 deg to improve stair negotiation. PROGRESSING 5/23/18 110 FLEXION    2. Pt will be able to perform 6\" step down without restrictions to help descend stairs.  Patient progressing , continues with step to pattern 5/18/18        PLAN  [x]  Upgrade activities as tolerated     [x]  Continue plan of care  []  Update interventions per flow sheet       []  Discharge due to:_  []  Other:_      Karmen Presser, PTA,  5/30/2018  10:00 AM    Future Appointments  Date Time Provider Fabiola Flynn   6/1/2018 2:00 PM Jennifer Thompson, PT [facility]   6/4/2018 7:30 AM Karmen Presser, PTA [facility]   6/6/2018 9:00 AM Karmen Presser, PTA [facility]   6/8/2018 9:00 AM Karmen Presser, PTA [facility]   6/11/2018 9:00 AM Karmen Presser, PTA [facility]   6/13/2018 9:00 AM Karmen Presser, PTA [facility]   6/15/2018 9:00 AM Karmen Presser, PTA [facility]   6/18/2018 9:00 AM Karmen Presser, PTA [facility]   6/20/2018 9:00 AM Karmen Presser, PTA [facility]   6/22/2018 9:00 AM Jennifer Thompson, PT [facility]   6/25/2018 9:00 AM Karmen Presser, PTA [facility]   6/27/2018 9:00 AM Karmen Presser, PTA [facility]   6/29/2018 9:00 AM Karmen Presser, PTA [facility]

## 2018-06-01 ENCOUNTER — HOSPITAL ENCOUNTER (OUTPATIENT)
Dept: PHYSICAL THERAPY | Age: 73
Discharge: HOME | End: 2018-06-01
Payer: MEDICARE

## 2018-06-01 PROCEDURE — 97140 MANUAL THERAPY 1/> REGIONS: CPT

## 2018-06-01 PROCEDURE — G8978 MOBILITY CURRENT STATUS: HCPCS

## 2018-06-01 PROCEDURE — G8979 MOBILITY GOAL STATUS: HCPCS

## 2018-06-01 PROCEDURE — 97014 ELECTRIC STIMULATION THERAPY: CPT

## 2018-06-02 NOTE — PROGRESS NOTES
[address] [facility] AT [facility] [address] [place] [address], [place], [place]  Phone [phone]  Fax [phone]  PROGRESS NOTE  Patient Name: Rebeka Gallegos : 1945   Treatment/Medical Diagnosis: S/P total knee replacement, right [Z96.651]   Referral Source: Bill Mandel MD     Date of Initial Visit: 2018 Attended Visits: 18 Missed Visits: 1     SUMMARY OF TREATMENT  Patient has received physical therapy for s/p Right TKR on 3/14/18 since 2018. Treatment has included therapeutic stretching and strengthening activities, manual/massage and modalities as need to assist with decreasing pain and increasing function. CURRENT STATUS  Patient has completed 18 visits  Patient reports overall 65% improvement since beginning therapy  FOTO (Functional Status Summary)  score is 39/100 was 32/100 initial evaluation - indication of overall functional improvement. Right knee PROM currently is 0-115* was 87 on initial evaluation. MMT is 4+/5 was 4-/5 on initial evaluation     Patient's remaining chief c/o is stiffness and scar tightness. Decreased scar mobility with less mobility inferiorly. Increased scar tissue inferior scar. Patient able to ascend and descend stairs reciprocally with B HR with CGA. Patient appears fearful with eccentric lowering. Gait pattern presents with decreased step length and flat foot strike . Long-term Goals: 4 weeks   New Goals to be achieved in __10__  treatments:  1.  Pt will improve R knee AROM 0-120 deg to improve stair negotiation. PROGRESSING 18 115 FLEXION    2.  Pt will be able to perform 6\" step down without restrictions to help descend stairs. Progressing, patient required CGA for descending stairs with decreased eccentric control of Right Quadriceps. 18          New Goals to be achieved in 4 weeks:  Continued/ Updated goals:    1.  Patient will increase Right knee AROM to 0-120 to improve stair negotiation      Progress Note (AROM 0-110*):       Current:     2. Patient will demonstrate ability to ascend and descend 4 8 inch stairs with reciprocal pattern to demonstrate increased Right quadriceps strength to 5/5 for eccentric lowering. Progress Note (4+/5 Right quad strength and B HR required to descend stairs. ):       Current:     3. Patient will tolerate 10 minutes of ambulation on treadmill with efficient gait to include equal step length B and proper heel strike B to allow patient to return to community ambulation distance. Progress Note (antalgic gait with decreased step length and flat foot strike):       Current:     RECOMMENDATIONS  Continue with above POC for 2 to 3 times a week for 4 weeks to achieve above goals  Mobility  Current  CL= 60-79%   Goal  CK= 40-59%. The severity rating is based on the Level of Assistance required for Functional Mobility and ADLs. If you have any questions/comments please contact us directly at [phone]. Thank you for allowing us to assist in the care of your patient. Therapist Signature: Fabiana Casillas PT Date: 6/1/2018     Time: 5:43 PM   NOTE TO PHYSICIAN:  PLEASE COMPLETE THE ORDERS BELOW AND FAX TO   [facility]: [phone]. If you are unable to process this request in 24 hours please contact our office: [phone].    ___ I have read the above report and request that my patient continue as recommended.   ___ I have read the above report and request that my patient continue therapy with the following changes/special instructions:_________________________________________________________   ___ I have read the above report and request that my patient be discharged from therapy.      Physician Signature:        Date:       Time:

## 2018-06-02 NOTE — PROGRESS NOTES
PT DAILY TREATMENT NOTE - [facility]     Patient Name: Cris Ivan  Date:2018  : 1945  [x]  Patient  Verified  Payor: [place] MEDICARE / Plan: [place] MEDICARE PART A & B / Product Type: Medicare /    In time:  230 Out time: 325   Total Treatment Time (min): 55  Total Timed Codes (min): 40  1:1 Treatment Time ( only):20  Visit #:7  of 10    Treatment Area: S/P total knee replacement, right [Z96.651]    SUBJECTIVE  Pain Level IN:(0-10 scale): 10  Pain Level OUT: (0-10 scale) post treatment:1/10    Any medication changes, allergies to medications, adverse drug reactions, diagnosis change, or new procedure performed?: [x] No    [] Yes (see summary sheet for update)  Subjective functional status/changes:   [] No changes reported  I have been more sore since I was here last time.  I can barely stand to touch my knee    OBJECTIVE    Modality rationale: decrease edema, decrease inflammation and decrease pain to improve the patients ability to ambulate without RW   Min Type Additional Details   15 [x] Estim:  [x]Unatt       [x]IFC  []Premod                        []Other:  [x]w/ice   [x]w/heat  Position: in long sitting  Location: to (R) knee    [] Estim: []Att    []TENS instruct  []NMES                    []Other:  []w/US   []w/ice   []w/heat  Position:  Location:    []  Traction: [] Cervical       []Lumbar                       [] Prone          []Supine                       []Intermittent   []Continuous Lbs:  [] before manual  [] after manual    []  Ultrasound: []Continuous   [] Pulsed                           []1MHz   []3MHz W/cm2:  Location:    []  Iontophoresis with dexamethasone         Location: [] Take home patch   [] In clinic    []  Ice     []  heat  []  Ice massage  []  Laser   []  Anodyne Position:  Location:    []  Laser with stim  []  Other:  Position:  Location:    []  Vasopneumatic Device Pressure:       [] lo [x] med [] hi   Temperature: [x] lo [] med [] hi   [x] Skin assessment post-treatment: [x]intact [x]redness- no adverse reaction    []redness  adverse reaction:       25//5 min Therapeutic Exercise:  [x] See flow sheet : 10 min NC for warm up on the bike and stretching  1:1 5 therex       Rationale: increase ROM, increase strength, improve coordination and improve balance to improve the patients ability to ambulate and perform normal ADL's      15 min Manual Therapy: In supine PROM and stretching into extension, Scar massage and medial and inferior patellar mobes, Sitting EOB flexion stretching into end ROM    Rationale: decrease pain, increase ROM, increase tissue extensibility, decrease edema  and decrease trigger points to (R) knee       With   [] TE   [] TA   [] neuro   [] other: Patient Education: [x] Review HEP    [] Progressed/Changed HEP based on:   [] positioning   [] body mechanics   [] transfers   [] heat/ice application    [] other     Objective/Functional Measures:   Right knee PROM 0-115*  Decreased scar mobility with less mobility inferiorly. Increased scar tissue inferior scar. B HR with step to pattern with stairs. Patient able to ascend and descend stairs reciprocally with B HR with CGA. Patient appears fearful with eccentric lowering. Gait pattern presents with decreased step length and flat foot strike     ASSESSMENT/Changes in Function    Patient is progressing towards goals slowly yet steadily. Patient will require continued PT 3 times a week to address gait pattern, eccentric strengtheing to allow for increased I with stairs with reciprocal pattern, to increase AROM and to increase scar mobility. Patient might benefit from KT tape along scar to decrease hyertrophic scar tissue.    Patient will continue to benefit from skilled PT services to modify and progress therapeutic interventions, address functional mobility deficits, address ROM deficits, address strength deficits, analyze and address soft tissue restrictions and instruct in home and community integration to attain remaining goals. []  See Plan of Care  [x]  See progress note/recertification  []  See Discharge Summary         Progress towards goals / Updated goals: 5/11/18   New Goals to be achieved in __10__  treatments:  1. Pt will improve R knee AROM 0-120 deg to improve stair negotiation. PROGRESSING 5/23/18 110 FLEXION    2. Pt will be able to perform 6\" step down without restrictions to help descend stairs. Patient progressing , continues with step to pattern 5/18/18        PLAN  [x]  Upgrade activities as tolerated     [x]  Continue plan of care  []  Update interventions per flow sheet       []  Discharge due to:_  [x]  Other:_ Continue w PT 3 times a week towards above goals.       Maurice Neil, PT,  6/1/2018  10:00 AM    Future Appointments  Date Time Provider Fabiola Flynn   6/4/2018 7:30 AM Sofia Mclaughlin PTA [facility]   6/6/2018 9:00 AM Sofia Mclaughlin PTA [facility]   6/8/2018 9:00 AM Sofia Mclaughlin PTA [facility]   6/11/2018 9:00 AM Sofia Mclaughlin PTA [facility]   6/13/2018 9:00 AM Sofia Mclaughlin PTA [facility]   6/15/2018 9:00 AM Sofia Mclaughlin PTA [facility]   6/18/2018 9:00 AM Sofia Mclaughlin PTA [facility]   6/20/2018 9:00 AM Sofia Mclaughlin PTA [facility]   6/22/2018 9:00 AM Maurice Neil PT [facility]   6/25/2018 9:00 AM Sofia Mclaughlin PTA [facility]   6/27/2018 9:00 AM ZOHRA [facility]az[facility]   6/29/2018 9:00 AM [facility]

## 2018-06-04 ENCOUNTER — HOSPITAL ENCOUNTER (OUTPATIENT)
Dept: PHYSICAL THERAPY | Age: 73
Discharge: HOME | End: 2018-06-04
Payer: MEDICARE

## 2018-06-04 PROCEDURE — 97014 ELECTRIC STIMULATION THERAPY: CPT | Performed by: PHYSICAL THERAPIST

## 2018-06-04 PROCEDURE — 97140 MANUAL THERAPY 1/> REGIONS: CPT | Performed by: PHYSICAL THERAPIST

## 2018-06-04 PROCEDURE — 97110 THERAPEUTIC EXERCISES: CPT | Performed by: PHYSICAL THERAPIST

## 2018-06-04 NOTE — PROGRESS NOTES
PT DAILY TREATMENT NOTE - [facility]     Patient Name: José Anderson  Date:2018  : 1945  [x]  Patient  Verified  Payor: [place] MEDICARE / Plan: [place] MEDICARE PART A & B / Product Type: Medicare /    In time:  1000am Out time: 1143am  Total Treatment Time (min): 103min  Total Timed Codes (min): 85  1:1 Treatment Time ( [address] only): 79  Visit #: 1  of 10    Treatment Area: S/P total knee replacement, right [Z96.651]    SUBJECTIVE  Pain Level IN:(0-10 scale): 2/10  Pain Level OUT: (0-10 scale) post treatment: 1/10    Any medication changes, allergies to medications, adverse drug reactions, diagnosis change, or new procedure performed?: [x] No    [] Yes (see summary sheet for update)  Subjective functional status/changes:   [] No changes reported  I am doing better than Friday    OBJECTIVE    Modality rationale: decrease edema, decrease inflammation and decrease pain to improve the patients ability to ambulate without RW   Min Type Additional Details   15 [x] Estim:  [x]Unatt       [x]IFC  []Premod                        []Other:  [x]w/ice   [x]w/heat  Position: in long sitting  Location: to (R) knee    [] Estim: []Att    []TENS instruct  []NMES                    []Other:  []w/US   []w/ice   []w/heat  Position:  Location:    []  Traction: [] Cervical       []Lumbar                       [] Prone          []Supine                       []Intermittent   []Continuous Lbs:  [] before manual  [] after manual    []  Ultrasound: []Continuous   [] Pulsed                           []1MHz   []3MHz W/cm2:  Location:    []  Iontophoresis with dexamethasone         Location: [] Take home patch   [] In clinic    []  Ice     []  heat  []  Ice massage  []  Laser   []  Anodyne Position:  Location:    []  Laser with stim  []  Other:  Position:  Location:    []  Vasopneumatic Device Pressure:       [] lo [x] med [] hi   Temperature: [x] lo [] med [] hi   [x] Skin assessment post-treatment:  [x]intact [x]redness- no adverse reaction    []redness  adverse reaction:       78/60 min Therapeutic Exercise:  [x] See flow sheet : 5 min NC for warm up on the bike and stretching      Rationale: increase ROM, increase strength, improve coordination and improve balance to improve the patients ability to ambulate and perform normal ADL's      10 min Manual Therapy: In prone PROM and stretching into flexion, Scar massage, medial and inferior patellar mobes, supine Knee extension mobes    Rationale: decrease pain, increase ROM, increase tissue extensibility, decrease edema  and decrease trigger points to (R) knee       With   [] TE   [] TA   [] neuro   [] other: Patient Education: [x] Review HEP    [] Progressed/Changed HEP based on:   [] positioning   [] body mechanics   [] transfers   [] heat/ice application    [] other     Objective/Functional Measures:   Eccentric therex :added in step downs for eccentric training, practiced steps reciprocal and performed 8in step ups with VC for form and speed to decreaese hip circumduction and UE use. Gait training; knee flexion, and Heel strike with rolling through to forefoot and push off     ASSESSMENT/Changes in Function    Patient has increased knee extension noted but continues with 15deg lag with SLR, improved gait following training in clinic with increased knee flexion and heel strike noted. Practiced eccentric lowering from step and instructed patient in HEP for progression to reciprocal stair negotiation. Confident with ascending reciprocal in clinic but apprehensive with descending. Patient will continue to benefit from skilled PT services to modify and progress therapeutic interventions, address functional mobility deficits, address ROM deficits, address strength deficits, analyze and address soft tissue restrictions and instruct in home and community integration to attain remaining goals.      []  See Plan of Care  [x]  See progress note/recertification  []  See Discharge Summary Progress towards goals / Updated goals: 5/11/18   New Goals to be achieved in _8-_10__  treatments:  1. Patient will increase Right knee AROM to 0-120 to improve stair negotiation      Progress Note (AROM 0-110*):       Current:      2. Patient will demonstrate ability to ascend and descend 4 8 inch stairs with reciprocal pattern to demonstrate increased Right quadriceps strength to 5/5 for eccentric lowering. Progress Note (4+/5 Right quad strength and B HR required to descend stairs. ):       Current:      3. Patient will tolerate 10 minutes of ambulation on treadmill with efficient gait to include equal step length B and proper heel strike B to allow patient to return to community ambulation distance. Progress Note (antalgic gait with decreased step length and flat foot strike):       Current:      PLAN  [x]  Upgrade activities as tolerated     [x]  Continue plan of care  []  Update interventions per flow sheet       []  Discharge due to:_  [x]  Other:_ Continue w PT 3 times a week towards above goals.       Charlene Harrell PT,  6/4/2018  10:00 AM    Future Appointments  Date Time Provider Fabiola Flynn   6/4/2018 10:00 AM Charlene Harrell PT [facility]   6/6/2018 9:00 AM Cleguero Moody PTA [facility]   6/8/2018 9:00 AM Bhanu Moody, PTA [facility]   6/11/2018 9:00 AM Cleguero Moody, PTA [facility]   6/13/2018 9:00 AM Clementlisa Moody, PTA [facility]   6/15/2018 9:00 AM Clementeen Slicrossana, PTA [facility]   6/18/2018 9:00 AM Clementlisa Moody, PTA [facility]   6/20/2018 9:00 AM Cleguero Moody, PTA [facility]   6/22/2018 9:00 AM Lisa Webb, PT [facility]   6/25/2018 9:00 AM Bhanu Moody PTA [facility]   6/27/2018 9:00 AM Bhanu Moody PTA [facility]   6/29/2018 9:00 AM Bhanu Moody PTA [facility]

## 2018-06-06 ENCOUNTER — HOSPITAL ENCOUNTER (OUTPATIENT)
Dept: PHYSICAL THERAPY | Age: 73
Discharge: HOME | End: 2018-06-06
Payer: MEDICARE

## 2018-06-06 PROCEDURE — 97140 MANUAL THERAPY 1/> REGIONS: CPT

## 2018-06-06 PROCEDURE — 97014 ELECTRIC STIMULATION THERAPY: CPT

## 2018-06-06 PROCEDURE — 97110 THERAPEUTIC EXERCISES: CPT

## 2018-06-06 NOTE — PROGRESS NOTES
PT DAILY TREATMENT NOTE - [facility]     Patient Name: Reuben Le  Date:2018  : 1945  [x]  Patient  Verified  Payor: [place] MEDICARE / Plan: [place] MEDICARE PART A & B / Product Type: Medicare /    In time:  9:00am Out time: 10:15  Total Treatment Time (min): 75  Total Timed Codes (min): 55  1:1 Treatment Time ( [address] only): 40  Visit #: 2  of 10    Treatment Area: S/P total knee replacement, right [Z96.651]    SUBJECTIVE  Pain Level IN:(0-10 scale): 2/10  Pain Level OUT: (0-10 scale) post treatment: 0/10    Any medication changes, allergies to medications, adverse drug reactions, diagnosis change, or new procedure performed?: [x] No    [] Yes (see summary sheet for update)  Subjective functional status/changes:   [] No changes reported:   It is still so sensitivity - I did put my compression stocking back on to see if that would help     OBJECTIVE    Modality rationale: decrease edema, decrease inflammation and decrease pain to improve the patients ability to ambulate without RW   Min Type Additional Details   15 [x] Estim:  [x]Unatt       [x]IFC  []Premod                        []Other:  [x]w/ice   [x]w/heat  Position: in long sitting  Location: to (R) knee    [] Estim: []Att    []TENS instruct  []NMES                    []Other:  []w/US   []w/ice   []w/heat  Position:  Location:    []  Traction: [] Cervical       []Lumbar                       [] Prone          []Supine                       []Intermittent   []Continuous Lbs:  [] before manual  [] after manual    []  Ultrasound: []Continuous   [] Pulsed                           []1MHz   []3MHz W/cm2:  Location:    []  Iontophoresis with dexamethasone         Location: [] Take home patch   [] In clinic    []  Ice     []  heat  []  Ice massage  []  Laser   []  Anodyne Position:  Location:    []  Laser with stim  []  Other:  Position:  Location:    []  Vasopneumatic Device Pressure:       [] lo [x] med [] hi   Temperature: [x] lo [] med [] hi   [x] Skin assessment post-treatment:  [x]intact [x]redness- no adverse reaction    []redness  adverse reaction:       40/35 min Therapeutic Exercise:  [x] See flow sheet : 5 min NC for warm up on the bike and stretching      Rationale: increase ROM, increase strength, improve coordination and improve balance to improve the patients ability to ambulate and perform normal ADL's      15+5 min Manual Therapy: In prone PROM and stretching into flexion, Scar massage, medial and inferior patellar mobes, supine Knee extension mobes   Applied 25% strip of K-tape over the incision site to assist with decreasing scar tissue and hyper sensitivity    Rationale: decrease pain, increase ROM, increase tissue extensibility, decrease edema  and decrease trigger points to (R) knee       With   [] TE   [] TA   [] neuro   [] other: Patient Education: [x] Review HEP    [] Progressed/Changed HEP based on:   [] positioning   [] body mechanics   [] transfers   [] heat/ice application    [] other     Objective/Functional Measures:   Patient achieved 120 with knee flexion with some reports of tightness and discomfort   Applied K-tape to incision site at 25% pull to assist with decreasing hypersensitivity and scar production  Patient demonstrated improved heel to toe gait pattern with improved heel strike and knee flexion with swing phase - patient would progress quicker if the knee/leg and incision was not so painful and sensitivity. ASSESSMENT/Changes in Function    Patient will continue to benefit from skilled PT services to modify and progress therapeutic interventions, address functional mobility deficits, address ROM deficits, address strength deficits, analyze and address soft tissue restrictions and instruct in home and community integration to attain remaining goals.      []  See Plan of Care  [x]  See progress note/recertification  []  See Discharge Summary         Progress towards goals / Updated goals: 5/11/18   New Goals to be achieved in _8-_10__  treatments:  1. Patient will increase Right knee AROM to 0-120 to improve stair negotiation      Progress Note (AROM 0-110*):       Current:      2. Patient will demonstrate ability to ascend and descend 4 8 inch stairs with reciprocal pattern to demonstrate increased Right quadriceps strength to 5/5 for eccentric lowering. Progress Note (4+/5 Right quad strength and B HR required to descend stairs. ):       Current:      3. Patient will tolerate 10 minutes of ambulation on treadmill with efficient gait to include equal step length B and proper heel strike B to allow patient to return to community ambulation distance.       Progress Note (antalgic gait with decreased step length and flat foot strike):       Current:      PLAN  [x]  Upgrade activities as tolerated     [x]  Continue plan of care  []  Update interventions per flow sheet       []  Discharge due to:_  []  Other:    Adrián Eason PTA,  6/6/2018  10:00 AM    Future Appointments  Date Time Provider Fabiola Flynn   6/6/2018 9:00 AM Adrián Eason PTA [facility]   6/8/2018 9:00 AM Adrián Eason, PTA [facility]   6/11/2018 9:00 AM Adrián Eason, PTA [facility]   6/13/2018 9:00 AM [facility]   6/15/2018 9:00 AM [facility]   6/18/2018 9:00 AM Adrián Gal, PTA [facility]   6/20/2018 9:00 AM [facility]   6/22/2018 9:00 AM Elisha Agosto PT [facility]   6/25/2018 9:00 AM [facility]   6/27/2018 9:00 AM [facility]   6/29/2018 9:00 AM Adrián Eason PTA [facility]

## 2018-06-08 ENCOUNTER — HOSPITAL ENCOUNTER (OUTPATIENT)
Dept: PHYSICAL THERAPY | Age: 73
Discharge: HOME | End: 2018-06-08
Payer: MEDICARE

## 2018-06-08 PROCEDURE — 97140 MANUAL THERAPY 1/> REGIONS: CPT

## 2018-06-08 PROCEDURE — 97110 THERAPEUTIC EXERCISES: CPT

## 2018-06-08 PROCEDURE — 97014 ELECTRIC STIMULATION THERAPY: CPT

## 2018-06-08 NOTE — PROGRESS NOTES
PT DAILY TREATMENT NOTE - [facility]     Patient Name: Gustavo López  Date:2018  : 1945  [x]  Patient  Verified  Payor: [place] MEDICARE / Plan: [place] MEDICARE PART A & B / Product Type: Medicare /    In time:  9:00am Out time: 10:15  Total Treatment Time (min): 75  Total Timed Codes (min): 55  1:1 Treatment Time ( [address] only): 30  Visit #: 3  of 10    Treatment Area: S/P total knee replacement, right [Z96.651]    SUBJECTIVE  Pain Level IN:(0-10 scale): 2/10  Pain Level OUT: (0-10 scale) post treatment: 0/10    Any medication changes, allergies to medications, adverse drug reactions, diagnosis change, or new procedure performed?: [x] No    [] Yes (see summary sheet for update)  Subjective functional status/changes:   [] No changes reported:   So I got this steroid cream to put on my leg and knee but it wasn't the pain medication cream that we talked about - but I will try it and see if it will work     OBJECTIVE    Modality rationale: decrease edema, decrease inflammation and decrease pain to improve the patients ability to ambulate without RW   Min Type Additional Details   15 [x] Estim:  [x]Unatt       [x]IFC  []Premod                        []Other:  [x]w/ice   [x]w/heat  Position: in long sitting  Location: to (R) knee    [] Estim: []Att    []TENS instruct  []NMES                    []Other:  []w/US   []w/ice   []w/heat  Position:  Location:    []  Traction: [] Cervical       []Lumbar                       [] Prone          []Supine                       []Intermittent   []Continuous Lbs:  [] before manual  [] after manual    []  Ultrasound: []Continuous   [] Pulsed                           []1MHz   []3MHz W/cm2:  Location:    []  Iontophoresis with dexamethasone         Location: [] Take home patch   [] In clinic    []  Ice     []  heat  []  Ice massage  []  Laser   []  Anodyne Position:  Location:    []  Laser with stim  []  Other:  Position:  Location:    []  Vasopneumatic Device Pressure:       [] lo [x] med [] hi   Temperature: [x] lo [] med [] hi   [x] Skin assessment post-treatment:  [x]intact [x]redness- no adverse reaction    []redness  adverse reaction:       45/40 min Therapeutic Exercise:  [x] See flow sheet : 5 min NC for warm up on the bike and stretching      Rationale: increase ROM, increase strength, improve coordination and improve balance to improve the patients ability to ambulate and perform normal ADL's      15 min Manual Therapy: In prone PROM and stretching into flexion, Scar massage, medial and inferior patellar mobes, supine Knee extension mobes      Rationale: decrease pain, increase ROM, increase tissue extensibility, decrease edema  and decrease trigger points to (R) knee       With   [] TE   [] TA   [] neuro   [] other: Patient Education: [x] Review HEP    [] Progressed/Changed HEP based on:   [] positioning   [] body mechanics   [] transfers   [] heat/ice application    [] other     Objective/Functional Measures:   Encouraged patient to apply the medicated cream on the entire lower leg and knee to assist with decreasing hypersensitivity to the knee  K-tape is still in place - the knee does appear to be intact over the incision site - encouraged to keep in place as long as possible      ASSESSMENT/Changes in Function    Patient will continue to benefit from skilled PT services to modify and progress therapeutic interventions, address functional mobility deficits, address ROM deficits, address strength deficits, analyze and address soft tissue restrictions and instruct in home and community integration to attain remaining goals. []  See Plan of Care  [x]  See progress note/recertification  []  See Discharge Summary         Progress towards goals / Updated goals: 6/1/18   New Goals to be achieved in _8-_10__  treatments:  1. Patient will increase Right knee AROM to 0-120 to improve stair negotiation      Progress Note (AROM 0-110*):       Current:      2.  Patient will demonstrate ability to ascend and descend 4 8 inch stairs with reciprocal pattern to demonstrate increased Right quadriceps strength to 5/5 for eccentric lowering. Progress Note (4+/5 Right quad strength and B HR required to descend stairs. ):       Current:      3. Patient will tolerate 10 minutes of ambulation on treadmill with efficient gait to include equal step length B and proper heel strike B to allow patient to return to community ambulation distance.       Progress Note (antalgic gait with decreased step length and flat foot strike):       Current:      PLAN  [x]  Upgrade activities as tolerated     [x]  Continue plan of care  []  Update interventions per flow sheet       []  Discharge due to:_  []  Other:    Adrián Eason PTA,  6/8/2018  10:00 AM    Future Appointments  Date Time Provider Fabiola Flynn   6/8/2018 9:00 AM Adrián Eason PTA [facility]   6/11/2018 9:00 AM Adráin Eason PTA [facility]   6/13/2018 9:00 AM Adrián Gal, PTA [facility]   6/15/2018 9:00 AM [facility]   6/18/2018 9:00 AM Adrián Gal, PTA [facility]   6/20/2018 9:00 AM [facility]   6/22/2018 9:00 AM Elisha Agosto PT [facility]   6/25/2018 9:00 AM Adrián Gal, PTA [facility]   6/27/2018 9:00 AM Adrián Gal, PTA [facility]   6/29/2018 9:00 AM [facility]

## 2018-06-11 ENCOUNTER — HOSPITAL ENCOUNTER (OUTPATIENT)
Dept: PHYSICAL THERAPY | Age: 73
Discharge: HOME | End: 2018-06-11
Payer: MEDICARE

## 2018-06-11 PROCEDURE — 97110 THERAPEUTIC EXERCISES: CPT

## 2018-06-11 PROCEDURE — 97014 ELECTRIC STIMULATION THERAPY: CPT

## 2018-06-11 PROCEDURE — 97140 MANUAL THERAPY 1/> REGIONS: CPT

## 2018-06-11 NOTE — PROGRESS NOTES
PT DAILY TREATMENT NOTE - [facility]     Patient Name: Ty Barnett  Date:2018  : 1945  [x]  Patient  Verified  Payor: [place] MEDICARE / Plan: [place] MEDICARE PART A & B / Product Type: Medicare /    In time:   9:00am Out time: 10:15  Total Treatment Time (min):75  Total Timed Codes (min): 55  1:1 Treatment Time ( [address] only): 30  Visit #: 4  of 10    Treatment Area: S/P total knee replacement, right [Z96.651]    SUBJECTIVE  Pain Level IN:(0-10 scale): 2/10  Pain Level OUT: (0-10 scale) post treatment: 0/10    Any medication changes, allergies to medications, adverse drug reactions, diagnosis change, or new procedure performed?: [x] No    [] Yes (see summary sheet for update)  Subjective functional status/changes:   [] No changes reported:  I  Started using the cream and I can tell a difference it is helping with the sensitivity     OBJECTIVE    Modality rationale: decrease edema, decrease inflammation and decrease pain to improve the patients ability to ambulate without RW   Min Type Additional Details   15 [x] Estim:  [x]Unatt       [x]IFC  []Premod                        []Other:  [x]w/ice   [x]w/heat  Position: in long sitting  Location: to (R) knee    [] Estim: []Att    []TENS instruct  []NMES                    []Other:  []w/US   []w/ice   []w/heat  Position:  Location:    []  Traction: [] Cervical       []Lumbar                       [] Prone          []Supine                       []Intermittent   []Continuous Lbs:  [] before manual  [] after manual    []  Ultrasound: []Continuous   [] Pulsed                           []1MHz   []3MHz W/cm2:  Location:    []  Iontophoresis with dexamethasone         Location: [] Take home patch   [] In clinic    []  Ice     []  heat  []  Ice massage  []  Laser   []  Anodyne Position:  Location:    []  Laser with stim  []  Other:  Position:  Location:    []  Vasopneumatic Device Pressure:       [] lo [x] med [] hi   Temperature: [x] lo [] med [] hi   [x] Skin assessment post-treatment:  [x]intact [x]redness- no adverse reaction    []redness  adverse reaction:       45/40 min Therapeutic Exercise:  [x] See flow sheet : 5 min NC for warm up on the bike and stretching  Added eccentric step downs  TG with mini-squats and heelraises      Rationale: increase ROM, increase strength, improve coordination and improve balance to improve the patients ability to ambulate and perform normal ADL's      15 min Manual Therapy: In prone PROM and stretching into flexion, Scar massage, medial and inferior patellar mobes, supine Knee extension mobes   Applied 25% small K-tape over the distal 1/4 on the incision site to assist with sensitivity and keloid      Rationale: decrease pain, increase ROM, increase tissue extensibility, decrease edema  and decrease trigger points to (R) knee       With   [] TE   [] TA   [] neuro   [] other: Patient Education: [x] Review HEP    [] Progressed/Changed HEP based on:   [] positioning   [] body mechanics   [] transfers   [] heat/ice application    [] other     Objective/Functional Measures:   Removed the k-tape that was applied last week Wednesday- decrease keloid and sensitivity over the incision site noted - however still presents with one area - approx 1/4 of the distal end of the incision site - applied a small stripe over area  Patient was able at achieve- AAROM-  120 with some pain at end range - progressing towards LTG#1    ASSESSMENT/Changes in Function    Patient will continue to benefit from skilled PT services to modify and progress therapeutic interventions, address functional mobility deficits, address ROM deficits, address strength deficits, analyze and address soft tissue restrictions and instruct in home and community integration to attain remaining goals.      []  See Plan of Care  [x]  See progress note/recertification  []  See Discharge Summary         Progress towards goals / Updated goals: 6/1/18   New Goals to be achieved in _8-_10__ treatments:  1. Patient will increase Right knee AROM to 0-120 to improve stair negotiation      Progress Note (AROM 0-110*): AAROM 0-120 6/11/2018      Current:      2. Patient will demonstrate ability to ascend and descend 4 8 inch stairs with reciprocal pattern to demonstrate increased Right quadriceps strength to 5/5 for eccentric lowering. Progress Note (4+/5 Right quad strength and B HR required to descend stairs. ):       Current:      3. Patient will tolerate 10 minutes of ambulation on treadmill with efficient gait to include equal step length B and proper heel strike B to allow patient to return to community ambulation distance.       Progress Note (antalgic gait with decreased step length and flat foot strike):       Current:      PLAN  [x]  Upgrade activities as tolerated     [x]  Continue plan of care  []  Update interventions per flow sheet       []  Discharge due to:_  []  Other:    John Paul Blackmon PTA,  6/11/2018  10:00 AM    Future Appointments  Date Time Provider Fabiola Flynn   6/13/2018 9:00 AM John Paul Blackmon PTA [facility]   6/15/2018 9:00 AM John Paul Blackmon PTA [facility]   6/18/2018 9:00 AM John Paul Blackmon PTA [facility]   6/20/2018 9:00 AM John Paul Blackmon PTA [facility]   6/22/2018 9:00 AM Khalif Beard PT [facility]   6/25/2018 9:00 AM [facility]   6/27/2018 9:00 AM John Paul Blackmon PTA [facility]   6/29/2018 9:00 AM John Paul Blackmon PTA [facility]

## 2018-06-13 ENCOUNTER — HOSPITAL ENCOUNTER (OUTPATIENT)
Dept: PHYSICAL THERAPY | Age: 73
Discharge: HOME | End: 2018-06-13
Payer: MEDICARE

## 2018-06-13 PROCEDURE — 97140 MANUAL THERAPY 1/> REGIONS: CPT

## 2018-06-13 PROCEDURE — 97110 THERAPEUTIC EXERCISES: CPT

## 2018-06-13 PROCEDURE — 97014 ELECTRIC STIMULATION THERAPY: CPT

## 2018-06-13 NOTE — PROGRESS NOTES
PT DAILY TREATMENT NOTE - [facility]     Patient Name: Melanie Castellon  Date:2018  : 1945  [x]  Patient  Verified  Payor: [place] MEDICARE / Plan: [place] MEDICARE PART A & B / Product Type: Medicare /    In time:   9:00am Out time: 10:10  Total Treatment Time (min):70  Total Timed Codes (min): 50  1:1 Treatment Time ( only): 30  Visit #: 5  of 10    Treatment Area: S/P total knee replacement, right [Z96.651]    SUBJECTIVE  Pain Level IN:(0-10 scale): 1/10  Pain Level OUT: (0-10 scale) post treatment: 0/10    Any medication changes, allergies to medications, adverse drug reactions, diagnosis change, or new procedure performed?: [x] No    [] Yes (see summary sheet for update)  Subjective functional status/changes:   [] No changes reported:  I can actually start noticing a difference in my knee - I feel like I am turning a corner and it is starting to feel better     OBJECTIVE    Modality rationale: decrease edema, decrease inflammation and decrease pain to improve the patients ability to ambulate without RW   Min Type Additional Details   15 [x] Estim:  [x]Unatt       [x]IFC  []Premod                        []Other:  [x]w/ice   [x]w/heat  Position: in long sitting  Location: to (R) knee    [] Estim: []Att    []TENS instruct  []NMES                    []Other:  []w/US   []w/ice   []w/heat  Position:  Location:    []  Traction: [] Cervical       []Lumbar                       [] Prone          []Supine                       []Intermittent   []Continuous Lbs:  [] before manual  [] after manual    []  Ultrasound: []Continuous   [] Pulsed                           []1MHz   []3MHz W/cm2:  Location:    []  Iontophoresis with dexamethasone         Location: [] Take home patch   [] In clinic    []  Ice     []  heat  []  Ice massage  []  Laser   []  Anodyne Position:  Location:    []  Laser with stim  []  Other:  Position:  Location:    []  Vasopneumatic Device Pressure:       [] lo [x] med [] hi   Temperature: [x] lo [] med [] hi   [x] Skin assessment post-treatment:  [x]intact [x]redness- no adverse reaction    []redness  adverse reaction:       40/35 min Therapeutic Exercise:  [x] See flow sheet : 5 min NC for warm up on the bike and stretching        Rationale: increase ROM, increase strength, improve coordination and improve balance to improve the patients ability to ambulate and perform normal ADL's      15 min Manual Therapy: In prone PROM and stretching into flexion, Scar massage, medial and inferior patellar mobes, supine Knee extension mobes   Intacted small K-tape over the distal 1/4 on the incision site to assist with sensitivity and keloid      Rationale: decrease pain, increase ROM, increase tissue extensibility, decrease edema  and decrease trigger points to (R) knee       With   [] TE   [] TA   [] neuro   [] other: Patient Education: [x] Review HEP    [] Progressed/Changed HEP based on:   [] positioning   [] body mechanics   [] transfers   [] heat/ice application    [] other     Objective/Functional Measures:   Patient presents with excellent PROM - patient need to continue to work on overall strength and endurance to allow patient to return to full time jobs that requires patient to be able to tolerate activities for 8 hours  Will advance exercise program - next visit    ASSESSMENT/Changes in Function    Patient will continue to benefit from skilled PT services to modify and progress therapeutic interventions, address functional mobility deficits, address ROM deficits, address strength deficits, analyze and address soft tissue restrictions and instruct in home and community integration to attain remaining goals. []  See Plan of Care  [x]  See progress note/recertification  []  See Discharge Summary         Progress towards goals / Updated goals: 6/1/18   New Goals to be achieved in _8-_10__  treatments:  1.  Patient will increase Right knee AROM to 0-120 to improve stair negotiation      Progress Note (AROM 0-110*): AAROM 0-120 6/11/2018      Current:      2. Patient will demonstrate ability to ascend and descend 4 8 inch stairs with reciprocal pattern to demonstrate increased Right quadriceps strength to 5/5 for eccentric lowering. Progress Note (4+/5 Right quad strength and B HR required to descend stairs. ):       Current:      3. Patient will tolerate 10 minutes of ambulation on treadmill with efficient gait to include equal step length B and proper heel strike B to allow patient to return to community ambulation distance.       Progress Note (antalgic gait with decreased step length and flat foot strike):       Current:      PLAN  [x]  Upgrade activities as tolerated     [x]  Continue plan of care  []  Update interventions per flow sheet       []  Discharge due to:_  []  Other:    Rachel Sullivan PTA,  6/13/2018  10:00 AM    Future Appointments  Date Time Provider Fabiola Flynn   6/15/2018 9:00 AM Rachel Sullivan PTA [facility]   6/18/2018 9:00 AM Rachel Sullivan PTA [facility]   6/20/2018 9:00 AM Rachel Sullivan PTA [facility]   6/22/2018 9:00 AM Dakota Hodge PT [facility]   6/25/2018 9:00 AM Rachel Sullivan PTA [facility]   6/27/2018 9:00 AM Rachel Sullivan PTA [facility]   6/29/2018 9:00 AM Rachel Sullivan, ZOHRA [facility]

## 2018-06-15 ENCOUNTER — HOSPITAL ENCOUNTER (OUTPATIENT)
Dept: PHYSICAL THERAPY | Age: 73
Discharge: HOME | End: 2018-06-15
Payer: MEDICARE

## 2018-06-15 PROCEDURE — 97140 MANUAL THERAPY 1/> REGIONS: CPT

## 2018-06-15 PROCEDURE — 97110 THERAPEUTIC EXERCISES: CPT

## 2018-06-15 PROCEDURE — 97014 ELECTRIC STIMULATION THERAPY: CPT

## 2018-06-15 NOTE — PROGRESS NOTES
PT DAILY TREATMENT NOTE - [facility]     Patient Name: Kody Hendrix  Date:2018  : 1945  [x]  Patient  Verified  Payor: [place] MEDICARE / Plan: [place] MEDICARE PART A & B / Product Type: Medicare /    In time:  9:00 Out time: 10:10  Total Treatment Time (min):70  Total Timed Codes (min): 55  1:1 Treatment Time ( [address] only): 30  Visit #: 6  of 10    Treatment Area: S/P total knee replacement, right [Z96.651]    SUBJECTIVE  Pain Level IN:(0-10 scale): 2/10  Pain Level OUT: (0-10 scale) post treatment: 0/10    Any medication changes, allergies to medications, adverse drug reactions, diagnosis change, or new procedure performed?: [x] No    [] Yes (see summary sheet for update)  Subjective functional status/changes:   [] No changes reported:   It is so wonderful that I am feeling better     OBJECTIVE    Modality rationale: decrease edema, decrease inflammation and decrease pain to improve the patients ability to ambulate without RW   Min Type Additional Details   15 [x] Estim:  [x]Unatt       [x]IFC  []Premod                        []Other:  [x]w/ice   [x]w/heat  Position: in long sitting  Location: to (R) knee    [] Estim: []Att    []TENS instruct  []NMES                    []Other:  []w/US   []w/ice   []w/heat  Position:  Location:    []  Traction: [] Cervical       []Lumbar                       [] Prone          []Supine                       []Intermittent   []Continuous Lbs:  [] before manual  [] after manual    []  Ultrasound: []Continuous   [] Pulsed                           []1MHz   []3MHz W/cm2:  Location:    []  Iontophoresis with dexamethasone         Location: [] Take home patch   [] In clinic    []  Ice     []  heat  []  Ice massage  []  Laser   []  Anodyne Position:  Location:    []  Laser with stim  []  Other:  Position:  Location:    []  Vasopneumatic Device Pressure:       [] lo [x] med [] hi   Temperature: [x] lo [] med [] hi   [x] Skin assessment post-treatment:  [x]intact [x]redness- no adverse reaction    []redness  adverse reaction:       40/35 min Therapeutic Exercise:  [x] See flow sheet : 5 min NC for warm up on the bike and stretching        Rationale: increase ROM, increase strength, improve coordination and improve balance to improve the patients ability to ambulate and perform normal ADL's      15 min Manual Therapy: In prone PROM and stretching into flexion, Scar massage, medial and inferior patellar mobes, supine Knee extension mobes   Intacted small K-tape over the distal 1/4 on the incision site to assist with sensitivity and keloid      Rationale: decrease pain, increase ROM, increase tissue extensibility, decrease edema  and decrease trigger points to (R) knee       With   [] TE   [] TA   [] neuro   [] other: Patient Education: [x] Review HEP    [] Progressed/Changed HEP based on:   [] positioning   [] body mechanics   [] transfers   [] heat/ice application    [] other     Objective/Functional Measures: quad 5/5        LTG#2 MET    ASSESSMENT/Changes in Function    Patient will continue to benefit from skilled PT services to modify and progress therapeutic interventions, address functional mobility deficits, address ROM deficits, address strength deficits, analyze and address soft tissue restrictions and instruct in home and community integration to attain remaining goals. []  See Plan of Care  [x]  See progress note/recertification  []  See Discharge Summary         Progress towards goals / Updated goals: 6/1/18   New Goals to be achieved in _8-_10__  treatments:  1. Patient will increase Right knee AROM to 0-120 to improve stair negotiation      Progress Note (AROM 0-110*): AAROM 0-120 6/11/2018      Current:      2. Patient will demonstrate ability to ascend and descend 4 8 inch stairs with reciprocal pattern to demonstrate increased Right quadriceps strength to 5/5 for eccentric lowering. Progress Note (4+/5 Right quad strength and B HR required to descend stairs.  ): Current: 5/5 in quad and the ability to ascend and descending stairs 6/15/18 -MET      3. Patient will tolerate 10 minutes of ambulation on treadmill with efficient gait to include equal step length B and proper heel strike B to allow patient to return to community ambulation distance.       Progress Note (antalgic gait with decreased step length and flat foot strike):       Current:      PLAN  [x]  Upgrade activities as tolerated     [x]  Continue plan of care  []  Update interventions per flow sheet       []  Discharge due to:_  []  Other:    Cristiano Huitron PTA,  6/15/2018  10:00 AM    Future Appointments  Date Time Provider Fabiola Flynn   6/18/2018 9:00 AM Cristiano Huitron PTA [facility]   6/20/2018 9:00 AM Cristiano Huitron PTA [facility]   6/22/2018 9:00 AM Sandi Mcknight PT [facility]   6/25/2018 9:00 AM Cristiano Huitron PTA [facility]   6/27/2018 9:00 AM Cristiano Huitron PTA [facility]   6/29/2018 9:00 AM Cristiano Huitron PTA [facility]

## 2018-06-18 ENCOUNTER — HOSPITAL ENCOUNTER (OUTPATIENT)
Dept: PHYSICAL THERAPY | Age: 73
Discharge: HOME | End: 2018-06-18
Payer: MEDICARE

## 2018-06-18 PROCEDURE — 97140 MANUAL THERAPY 1/> REGIONS: CPT

## 2018-06-18 PROCEDURE — 97110 THERAPEUTIC EXERCISES: CPT

## 2018-06-18 PROCEDURE — 97014 ELECTRIC STIMULATION THERAPY: CPT

## 2018-06-18 NOTE — PROGRESS NOTES
PT DAILY TREATMENT NOTE - [facility]     Patient Name: José Anderson  Date:2018  : 1945  [x]  Patient  Verified  Payor: [place] MEDICARE / Plan: [place] MEDICARE PART A & B / Product Type: Medicare /    In time:  9:00 Out time: 10:15  Total Treatment Time (min)75  Total Timed Codes (min): 55  1:1 Treatment Time [facility] only):30  Visit #: 7  of 10    Treatment Area: S/P total knee replacement, right [Z96.651]    SUBJECTIVE  Pain Level IN:(0-10 scale): tight  Pain Level OUT: (0-10 scale) post treatment: 0/10    Any medication changes, allergies to medications, adverse drug reactions, diagnosis change, or new procedure performed?: [x] No    [] Yes (see summary sheet for update)  Subjective functional status/changes:   [] No changes reported:  Can you put on a new piece of tape on my incision the end part because it does really help to flatten that scar out.  Today it just feels really tight when I bend it      OBJECTIVE    Modality rationale: decrease edema, decrease inflammation and decrease pain to improve the patients ability to ambulate without RW   Min Type Additional Details   15+5 set up [x] Estim:  [x]Unatt       [x]IFC  []Premod                        []Other:  [x]w/ice   [x]w/heat  Position: in long sitting  Location: to (R) knee    [] Estim: []Att    []TENS instruct  []NMES                    []Other:  []w/US   []w/ice   []w/heat  Position:  Location:    []  Traction: [] Cervical       []Lumbar                       [] Prone          []Supine                       []Intermittent   []Continuous Lbs:  [] before manual  [] after manual    []  Ultrasound: []Continuous   [] Pulsed                           []1MHz   []3MHz W/cm2:  Location:    []  Iontophoresis with dexamethasone         Location: [] Take home patch   [] In clinic    []  Ice     []  heat  []  Ice massage  []  Laser   []  Anodyne Position:  Location:    []  Laser with stim  []  Other:  Position:  Location:    []  Vasopneumatic Device Pressure: [] lo [x] med [] hi   Temperature: [x] lo [] med [] hi   [x] Skin assessment post-treatment:  [x]intact [x]redness- no adverse reaction    []redness  adverse reaction:       40/35 min Therapeutic Exercise:  [x] See flow sheet : 5 min NC for warm up on the bike and stretching  Added self stretching in sitting with heel on top of orange ball and strap to increase knee flexion         Rationale: increase ROM, increase strength, improve coordination and improve balance to improve the patients ability to ambulate and perform normal ADL's      15 min Manual Therapy: In prone PROM and stretching into flexion, Scar massage, medial and inferior patellar mobes, supine Knee extension mobes   Applied  small K-tape over the distal 1/4 on the incision site to assist with sensitivity and keloid      Rationale: decrease pain, increase ROM, increase tissue extensibility, decrease edema  and decrease trigger points to (R) knee       With   [] TE   [] TA   [] neuro   [] other: Patient Education: [x] Review HEP    [] Progressed/Changed HEP based on:   [] positioning   [] body mechanics   [] transfers   [] heat/ice application    [] other     Objective/Functional Measures: 115 with knee flexion - some increase tightness noted with end range - unable to achieve 120 today   Reapplied steri stripe to the distal part of the incision site  Encouraged to stretch at home today for several times secondary to increase tightness and decrease ROM achieved    ASSESSMENT/Changes in Function    Patient will continue to benefit from skilled PT services to modify and progress therapeutic interventions, address functional mobility deficits, address ROM deficits, address strength deficits, analyze and address soft tissue restrictions and instruct in home and community integration to attain remaining goals.      []  See Plan of Care  [x]  See progress note/recertification  []  See Discharge Summary         Progress towards goals / Updated goals: 6/1/18   New Goals to be achieved in _8-_10__  treatments:  1. Patient will increase Right knee AROM to 0-120 to improve stair negotiation      Progress Note (AROM 0-110*): AAROM 0-120 6/11/2018      Current:      2. Patient will demonstrate ability to ascend and descend 4 8 inch stairs with reciprocal pattern to demonstrate increased Right quadriceps strength to 5/5 for eccentric lowering. Progress Note (4+/5 Right quad strength and B HR required to descend stairs. ):       Current: 5/5 in quad and the ability to ascend and descending stairs 6/15/18 -MET      3. Patient will tolerate 10 minutes of ambulation on treadmill with efficient gait to include equal step length B and proper heel strike B to allow patient to return to community ambulation distance.       Progress Note (antalgic gait with decreased step length and flat foot strike):       Current:      PLAN  [x]  Upgrade activities as tolerated     [x]  Continue plan of care  []  Update interventions per flow sheet       []  Discharge due to:_  []  Other:    Heather Gaffney PTA,  6/18/2018  10:00 AM    Future Appointments  Date Time Provider Fabiola Flynn   6/20/2018 9:00 AM Heather Gaffney PTA [facility]   6/22/2018 9:00 AM Napoleon Lock PT [facility]   6/25/2018 9:00 AM Heather Gaffney PTA [facility]   6/27/2018 9:00 AM Heather Gaffney PTA [facility]   6/29/2018 9:00 AM ZOHRA Sethi

## 2018-06-20 ENCOUNTER — HOSPITAL ENCOUNTER (OUTPATIENT)
Dept: PHYSICAL THERAPY | Age: 73
Discharge: HOME | End: 2018-06-20
Payer: MEDICARE

## 2018-06-20 PROCEDURE — 97014 ELECTRIC STIMULATION THERAPY: CPT

## 2018-06-20 PROCEDURE — 97140 MANUAL THERAPY 1/> REGIONS: CPT

## 2018-06-20 PROCEDURE — 97110 THERAPEUTIC EXERCISES: CPT

## 2018-06-20 NOTE — PROGRESS NOTES
PT DAILY TREATMENT NOTE - [facility]     Patient Name: Mell Getting  Date:2018  : 1945  [x]  Patient  Verified  Payor: [place] MEDICARE / Plan: [place] MEDICARE PART A & B / Product Type: Medicare /    In time:  9:02 Out time: 10:08  Total Treatment Time (min)66  Total Timed Codes (min): 51  1:1 Treatment Time [facility] only):30  Visit #: 8  of 10    Treatment Area: S/P total knee replacement, right [Z96.651]    SUBJECTIVE  Pain Level IN:(0-10 scale): 2/10  Pain Level OUT: (0-10 scale) post treatment: 0/10    Any medication changes, allergies to medications, adverse drug reactions, diagnosis change, or new procedure performed?: [x] No    [] Yes (see summary sheet for update)  Subjective functional status/changes:   [] No changes reported:   It is that tape that makes my knee hurt but I want to leave it on there because I know that it is helping the scar - so I will tolerate it      OBJECTIVE    Modality rationale: decrease edema, decrease inflammation and decrease pain to improve the patients ability to ambulate without RW   Min Type Additional Details   15 [x] Estim:  [x]Unatt       [x]IFC  []Premod                        []Other:  [x]w/ice   [x]w/heat  Position: in long sitting  Location: to (R) knee    [] Estim: []Att    []TENS instruct  []NMES                    []Other:  []w/US   []w/ice   []w/heat  Position:  Location:    []  Traction: [] Cervical       []Lumbar                       [] Prone          []Supine                       []Intermittent   []Continuous Lbs:  [] before manual  [] after manual    []  Ultrasound: []Continuous   [] Pulsed                           []1MHz   []3MHz W/cm2:  Location:    []  Iontophoresis with dexamethasone         Location: [] Take home patch   [] In clinic    []  Ice     []  heat  []  Ice massage  []  Laser   []  Anodyne Position:  Location:    []  Laser with stim  []  Other:  Position:  Location:    []  Vasopneumatic Device Pressure:       [] lo [x] med [] hi   Temperature: [x] lo [] med [] hi   [x] Skin assessment post-treatment:  [x]intact [x]redness- no adverse reaction    []redness  adverse reaction:       36/31 min Therapeutic Exercise:  [x] See flow sheet : 5 min NC for warm up on          Rationale: increase ROM, increase strength, improve coordination and improve balance to improve the patients ability to ambulate and perform normal ADL's      15 min Manual Therapy: In supine PROM and stretching into flexion, Scar massage, medial and inferior patellar mobes, supine Knee extension mobes         Rationale: decrease pain, increase ROM, increase tissue extensibility, decrease edema  and decrease trigger points to (R) knee       With   [] TE   [] TA   [] neuro   [] other: Patient Education: [x] Review HEP    [] Progressed/Changed HEP based on:   [] positioning   [] body mechanics   [] transfers   [] heat/ice application    [] other     Objective/Functional Measures:  Patient was able to achieve AAROM - 115 and then 120 passive       Decreased edema noted to the knee       Continues to present with some hypersensitivity with to distal aspect of the incision site - K-tape still intact     ASSESSMENT/Changes in Function    Patient will continue to benefit from skilled PT services to modify and progress therapeutic interventions, address functional mobility deficits, address ROM deficits, address strength deficits, analyze and address soft tissue restrictions and instruct in home and community integration to attain remaining goals. []  See Plan of Care  [x]  See progress note/recertification  []  See Discharge Summary         Progress towards goals / Updated goals: 6/1/18   New Goals to be achieved in _8-_10__  treatments:  1. Patient will increase Right knee AROM to 0-120 to improve stair negotiation      Progress Note (AROM 0-110*): AAROM 0-120 6/11/2018      Current:      2.  Patient will demonstrate ability to ascend and descend 4 8 inch stairs with reciprocal pattern to demonstrate increased Right quadriceps strength to 5/5 for eccentric lowering. Progress Note (4+/5 Right quad strength and B HR required to descend stairs. ):       Current: 5/5 in quad and the ability to ascend and descending stairs 6/15/18 -MET      3. Patient will tolerate 10 minutes of ambulation on treadmill with efficient gait to include equal step length B and proper heel strike B to allow patient to return to community ambulation distance.       Progress Note (antalgic gait with decreased step length and flat foot strike):       Current:      PLAN  [x]  Upgrade activities as tolerated     [x]  Continue plan of care  []  Update interventions per flow sheet       []  Discharge due to:_  []  Other:    Ignacio Golden PTA,  6/20/2018  10:00 AM    Future Appointments  Date Time Provider Fabiola Flynn   6/22/2018 9:00 AM Bassem Huang PT [facility]   6/25/2018 9:00 AM Ignacio Golden PTA [facility]   6/27/2018 9:00 AM Ignacio Golden PTA [facility]   6/29/2018 9:00 AM Ignacio Golden PTA [facility]

## 2018-06-22 ENCOUNTER — HOSPITAL ENCOUNTER (OUTPATIENT)
Dept: PHYSICAL THERAPY | Age: 73
Discharge: HOME | End: 2018-06-22
Payer: MEDICARE

## 2018-06-22 PROCEDURE — 97014 ELECTRIC STIMULATION THERAPY: CPT

## 2018-06-22 PROCEDURE — G8979 MOBILITY GOAL STATUS: HCPCS

## 2018-06-22 PROCEDURE — 97110 THERAPEUTIC EXERCISES: CPT

## 2018-06-22 PROCEDURE — 97140 MANUAL THERAPY 1/> REGIONS: CPT

## 2018-06-22 PROCEDURE — G8978 MOBILITY CURRENT STATUS: HCPCS

## 2018-06-22 NOTE — PROGRESS NOTES
[address] [facility] AT [facility]  [address] [facility] [address] [place] [address],[address], [place], [place]  Phone [phone]  Fax [phone]  PROGRESS NOTE  Patient Name: Sanjeev Smith : 1945   Treatment/Medical Diagnosis: S/P total knee replacement, right [Z96.651]   Referral Source: Macy Young MD     Date of Initial Visit: 2018 Attended Visits: 26 Missed Visits: 1     SUMMARY OF TREATMENT  Patient has received physical therapy for s/p Right TKR on 3/14/18 since 2018. Treatment has included therapeutic stretching and strengthening activities, manual/massage and modalities as need to assist with decreasing pain and increasing function. CURRENT STATUS  Patient has completed 26 visits  Patient reports overall 75% improvement since beginning therapy  FOTO (Functional Status Summary)  score is 43 /100 was 32/100 initial evaluation - indication of overall functional improvement. Right knee PROM currently is 0-115* was 87 on initial evaluation. MMT is 4+/5 was 4-/5 on initial evaluation  Patient's remaining chief c/o is pain, stiffness and scar tightness. Continued scar sensitivity along inferior incisional scar. Patient able to ambulate up and down stairs with reciprocal gait pattern with one HR with SUPV. Improvements noted in gait pattern with normalized step length, heel strike and toe off. Continued goals:  1. Patient will increase Right knee AROM to 0-120 to improve stair negotiation      Progress Note (AROM 0-115*):       Current:     2. Patient will demonstrate ability to ascend and descend 4 8 inch stairs with reciprocal pattern to demonstrate increased Right quadriceps strength to 5/5 for eccentric lowering. Progress Note (4+/5 Right quad strength and one  HR required to descend stairs. ):       Current:     3.  Patient will tolerate 10 minutes of ambulation on treadmill with efficient gait to include equal step length B and proper heel strike B to allow patient to return to community ambulation distance. Progress Note: Improvements noted with normalized gait pattern, however tolerance to ambulation is less than 10 minutes      Current:     RECOMMENDATIONS  Continue with above POC for 2 times a week for 4 weeks to achieve above goals    Mobility  Current  CL= 60-79%   Goal  CK= 40-59%. The severity rating is based on the Level of Assistance required for Functional Mobility and ADLs. If you have any questions/comments please contact us directly at [phone]. Thank you for allowing us to assist in the care of your patient. Therapist Signature: Anny Ruiz, PT Date: 6/1/2018     Time: 5:43 PM   NOTE TO PHYSICIAN:  PLEASE COMPLETE THE ORDERS BELOW AND FAX TO   [facility]: [phone]. If you are unable to process this request in 24 hours please contact our office: [phone].    ___ I have read the above report and request that my patient continue as recommended.   ___ I have read the above report and request that my patient continue therapy with the following changes/special instructions:_________________________________________________________   ___ I have read the above report and request that my patient be discharged from therapy.      Physician Signature:        Date:       Time:

## 2018-06-22 NOTE — PROGRESS NOTES
PT DAILY TREATMENT NOTE 814    Patient Name: José Antonio Hardin  Date:2018  : 1945  [x]  Patient  Verified  Payor: Lottie Lam / Plan: [place] MEDICARE PART A & B / Product Type: Medicare /    In time:840  Out time:930  Total Treatment Time (min): 50  Total Timed Codes (min): 35  1:1 Treatment Time (min): 35   Visit #: 9 of 10    Treatment Area: S/P total knee replacement, right [Z96.651]    SUBJECTIVE  Pain Level (0-10 scale): 2  Any medication changes, allergies to medications, adverse drug reactions, diagnosis change, or new procedure performed?: [x] No    [] Yes (see summary sheet for update)  Subjective functional status/changes:   [] No changes reported  I feel like the tape has helped, but it makes my knee so tight.      OBJECTIVE  Modality rationale: decrease inflammation, decrease pain and increase tissue extensibility to improve the patients ability to improve tolerance to activity    Min Type Additional Details   15 [x] Estim: []Att   []Unatt        []TENS instruct                  [x]IFC  []Premod   []NMES                     []Other:  []w/US   [x]w/ice   []w/heat  Position:  Location:    []  Traction: [] Cervical       []Lumbar                       [] Prone          []Supine                       []Intermittent   []Continuous Lbs:  [] before manual  [] after manual    []  Ultrasound: []Continuous   [] Pulsed                           []1MHz   []3MHz Location:  W/cm2:    []  Iontophoresis with dexamethasone         Location: [] Take home patch   [] In clinic    []  Ice     []  heat  []  Ice massage Position:  Location:    []  Vasopneumatic Device Pressure:       [] lo [] med [] hi   Temperature: [] lo [] med [] hi   [] Skin assessment post-treatment:  []intact []redness- no adverse reaction       []redness  adverse reaction:       20 min Therapeutic Exercise:  [x] See flow sheet : LAQs, SLR, Heel slides and quad sets   Rationale: increase ROM and increase strength to improve the patients ability to tolerate prolonged ambulation     15 min Manual Therapy:  Scar massage, DTM lateral hamstring and quadriceps, AAROM flexion and extension, patellar and tibial joint mobs   Rationale: increase ROM and increase tissue extensibility to increase tolerance to activity            min Patient Education: [x] Review HEP    [] Progressed/Changed HEP based on:   [] positioning   [] body mechanics   [] transfers   [] heat/ice application        Other Objective/Functional Measures:   Right knee PROM currently is 0-115* was 4-87* on initial evaluation.     MMT Right knee flexion and extnsion is 4+/5 was 4-/5 on initial evaluation  Patient's remaining chief c/o is pain, stiffness and scar tightness. Continued scar sensitivity along inferior incisional scar. Patient able to ambulate up and down stairs with reciprocal gait pattern with one HR with SUPV. Improvements noted in gait pattern with normalized step length, heel strike and toe off.      Pain Level (0-10 scale) post treatment: 1/10    ASSESSMENT/Changes in Function: Patient has made progress with ROM, strength, tolerance to activity, normalized gait and transfers. Patient should benefit from continued PT 2 times a week to continue to address clinical deficits. Plan to emphasize HEP and increasing strength to allow patient to return to community ambulation and work. []  See Plan of Care  [x]  See progress note/recertification  []  See Discharge Summary          Updated goals:  1. Patient will increase Right knee AROM to 0-120 to improve stair negotiation      Progress Note (AROM 0-115*):       Current:      2. Patient will demonstrate ability to ascend and descend 4 8 inch stairs with reciprocal pattern to demonstrate increased Right quadriceps strength to 5/5 for eccentric lowering. Progress Note (4+/5 Right quad strength and one  HR required to descend stairs. ):       Current:      3.  Patient will tolerate 10 minutes of ambulation on treadmill with efficient gait to include equal step length B and proper heel strike B to allow patient to return to community ambulation distance.       Progress Note: Improvements noted with normalized gait pattern, however tolerance to ambulation is less than 10 minutes      Current:        PLAN  []  Upgrade activities as tolerated     []  Continue plan of care  []  Update interventions per flow sheet       []  Discharge due to:_  [x]  Other:_ Continue w PT 2 times a week for 4 weeks     Camille Hoang, PT 6/22/2018  5:13 PM

## 2018-06-25 ENCOUNTER — APPOINTMENT (OUTPATIENT)
Dept: PHYSICAL THERAPY | Age: 73
End: 2018-06-25
Payer: MEDICARE

## 2018-06-26 ENCOUNTER — HOSPITAL ENCOUNTER (OUTPATIENT)
Dept: PHYSICAL THERAPY | Age: 73
Discharge: HOME | End: 2018-06-26
Payer: MEDICARE

## 2018-06-26 PROCEDURE — 97140 MANUAL THERAPY 1/> REGIONS: CPT

## 2018-06-26 PROCEDURE — 97016 VASOPNEUMATIC DEVICE THERAPY: CPT

## 2018-06-26 PROCEDURE — 97110 THERAPEUTIC EXERCISES: CPT

## 2018-06-26 NOTE — PROGRESS NOTES
PT DAILY TREATMENT NOTE     Patient Name: Iris Washburn  Date:2018  : 1945  [x]  Patient  Verified  Payor: VA MEDICARE / Plan: VA MEDICARE PART A & B / Product Type: Medicare /    In time:500  Out time:620  Total Treatment Time (min): 80  Total Timed Codes (min): 65  1:1 Treatment Time (min): 50   Visit #: 1 of 10    Treatment Area: S/P total knee replacement, right [Z96.651]    SUBJECTIVE  Pain Level (0-10 scale): 2/10  Any medication changes, allergies to medications, adverse drug reactions, diagnosis change, or new procedure performed?: [x] No    [] Yes (see summary sheet for update)  Subjective functional status/changes:   [] No changes reported  Patient she returned to work yesterday and at the end of the day her ankles were as big as her thighs (wiht increased edema).     OBJECTIVE  Modality rationale: decrease edema to improve the patients ability to tolerate prolonged activity   Min Type Additional Details    [] Estim: []Att   []Unatt        []TENS instruct                  []IFC  []Premod   []NMES                     []Other:  []w/US   []w/ice   []w/heat  Position:  Location:    []  Traction: [] Cervical       []Lumbar                       [] Prone          []Supine                       []Intermittent   []Continuous Lbs:  [] before manual  [] after manual    []  Ultrasound: []Continuous   [] Pulsed                           []1MHz   []3MHz Location:  W/cm2:    []  Iontophoresis with dexamethasone         Location: [] Take home patch   [] In clinic    []  Ice     []  heat  []  Ice massage Position:  Location:   15 [x]  Vasopneumatic Device ankle and knee sleeve Pressure:       [] lo [x] med [] hi   Temperature: [x] lo [] med [] hi   [] Skin assessment post-treatment:  []intact []redness- no adverse reaction       []redness  adverse reaction:       45/40 min Therapeutic Exercise:  [x] See flow sheet : 30 1:1 time    Rationale: increase ROM, increase strength and improve balance to improve the patients ability to tolerate community ambulation       20 min Manual Therapy:  Gentle effleurage from toes to proximal to knee due to significant 2-3+ pitting edema in Right LE, scar massage and patellar mobes    Rationale: decrease pain and decrease edema  to assist in normalizing ADLS           min Patient Education: [x] Review HEP    [] Progressed/Changed HEP based on:   [] positioning   [] body mechanics   [] transfers   [] heat/ice application        Other Objective/Functional Measures:   No flexion/extension stretcing performed this session due to 2-3+ pitting edema RtLE  Decreased edema noted post manual and vasopneumatic compression/cold therapy. Pain Level (0-10 scale) post treatment: 0/10     ASSESSMENT/Changes in Function: Patient limited in tolerance to therex this session due to edema. Patient education performed regarding wearing compression stockings and elevating LE and getting up and walking every hour while at work. Patient will continue to benefit from skilled PT services to modify and progress therapeutic interventions, address functional mobility deficits, address ROM deficits, address strength deficits and analyze and address soft tissue restrictions to attain remaining goals. []  See Plan of Care  []  See progress note/recertification  []  See Discharge Summary         Progress towards goals / Updated goals:  1. Patient will increase Right knee AROM to 0-120 to improve stair negotiation      Progress Note (AROM 0-115*):       Current:      2. Patient will demonstrate ability to ascend and descend 4 8 inch stairs with reciprocal pattern to demonstrate increased Right quadriceps strength to 5/5 for eccentric lowering.        Progress Note (4+/5 Right quad strength and one  HR required to descend stairs. ):       Current:      3.  Patient will tolerate 10 minutes of ambulation on treadmill with efficient gait to include equal step length B and proper heel strike B to allow patient to return to community ambulation distance.      Progress Note: Improvements noted with normalized gait pattern, however tolerance to ambulation is less than 10 minutes      Current:     PLAN  []  Upgrade activities as tolerated     [x]  Continue plan of care  []  Update interventions per flow sheet       []  Discharge due to:_  []  Other:_      Elisha Agosto, PT 6/26/2018  7:00 PM

## 2018-06-27 ENCOUNTER — HOSPITAL ENCOUNTER (OUTPATIENT)
Dept: PHYSICAL THERAPY | Age: 73
Discharge: HOME | End: 2018-06-27
Payer: MEDICARE

## 2018-06-27 ENCOUNTER — APPOINTMENT (OUTPATIENT)
Dept: PHYSICAL THERAPY | Age: 73
End: 2018-06-27
Payer: MEDICARE

## 2018-06-27 PROCEDURE — 97016 VASOPNEUMATIC DEVICE THERAPY: CPT | Performed by: PHYSICAL THERAPIST

## 2018-06-27 PROCEDURE — 97110 THERAPEUTIC EXERCISES: CPT | Performed by: PHYSICAL THERAPIST

## 2018-06-27 PROCEDURE — 97140 MANUAL THERAPY 1/> REGIONS: CPT | Performed by: PHYSICAL THERAPIST

## 2018-06-27 NOTE — PROGRESS NOTES
PT DAILY TREATMENT NOTE - [facility]     Patient Name: Sandra Victoria  Date:2018  : 1945  [x]  Patient  Verified  Payor: [place] MEDICARE / Plan: [place] MEDICARE PART A & B / Product Type: Medicare /    In time: 405pm Out time: 540pm  Total Treatment Time (min) 95  Total Timed Codes (min): 90  1:1 Treatment Time [facility] only):50  Visit #: 9  of 10    Treatment Area: S/P total knee replacement, right [Z96.651]    SUBJECTIVE  Pain Level IN:(0-10 scale): 210  Pain Level OUT: (0-10 scale) post treatment: 1-210    Any medication changes, allergies to medications, adverse drug reactions, diagnosis change, or new procedure performed?: [x] No    [] Yes (see summary sheet for update)  Subjective functional status/changes:   [] No changes reported:  I am not as swollen today.       OBJECTIVE    Modality rationale: decrease edema, decrease inflammation and decrease pain to improve the patients ability to ambulate without RW   Min Type Additional Details    [x] Estim:  [x]Unatt       [x]IFC  []Premod                        []Other:  [x]w/ice   [x]w/heat  Position: in long sitting  Location: to (R) knee    [] Estim: []Att    []TENS instruct  []NMES                    []Other:  []w/US   []w/ice   []w/heat  Position:  Location:    []  Traction: [] Cervical       []Lumbar                       [] Prone          []Supine                       []Intermittent   []Continuous Lbs:  [] before manual  [] after manual    []  Ultrasound: []Continuous   [] Pulsed                           []1MHz   []3MHz W/cm2:  Location:    []  Iontophoresis with dexamethasone         Location: [] Take home patch   [] In clinic    []  Ice     []  heat  []  Ice massage  []  Laser   []  Anodyne Position:  Location:    []  Laser with stim  []  Other:  Position:  Location:   15 [x]  Vasopneumatic Device Pressure:       [] lo [x] med [] hi   Temperature: [x] lo [] med [] hi   [x] Skin assessment post-treatment:  [x]intact [x]redness- no adverse reaction []redness  adverse reaction:       70/40 min Therapeutic Exercise:  [x] See flow sheet : 5 min NC for warm up on          Rationale: increase ROM, increase strength, improve coordination and improve balance to improve the patients ability to ambulate and perform normal ADL's      10 min Manual Therapy: In supine PROM and stretching into flexion, Scar massage, medial and inferior patellar mobes, supine Knee extension mobes, massage for swelling         Rationale: decrease pain, increase ROM, increase tissue extensibility, decrease edema  and decrease trigger points to (R) knee       With   [] TE   [] TA   [] neuro   [] other: Patient Education: [x] Review HEP    [] Progressed/Changed HEP based on:   [] positioning   [] body mechanics   [] transfers   [] heat/ice application    [] other     Objective/Functional Measures:     walking tolerance <10 min. ASSESSMENT/Changes in Function   PN NV needed, patient had decreased AROM today but most likely due to increased swelling as she returned to work yesterday and had been working all day today. Performed Vaso for swelling with decreased noted following cold/compression therapy. Patient will continue to benefit from skilled PT services to modify and progress therapeutic interventions, address functional mobility deficits, address ROM deficits, address strength deficits, analyze and address soft tissue restrictions and instruct in home and community integration to attain remaining goals. []  See Plan of Care  [x]  See progress note/recertification  []  See Discharge Summary         Progress towards goals / Updated goals: 6/1/18   New Goals to be achieved in _8-_10__  treatments:  1. Patient will increase Right knee AROM to 0-120 to improve stair negotiation      Progress Note (AROM 0-110*):       Current:AAROM 0-110, AROM: 105  Decreased from last visit due to swelling 6/27/2018      2.  Patient will demonstrate ability to ascend and descend 4 8 inch stairs with reciprocal pattern to demonstrate increased Right quadriceps strength to 5/5 for eccentric lowering. Progress Note (4+/5 Right quad strength and B HR required to descend stairs. ):       Current: 5/5 in quad and the ability to ascend and descending stairs 6/15/18 -MET      3. Patient will tolerate 10 minutes of ambulation on treadmill with efficient gait to include equal step length B and proper heel strike B to allow patient to return to community ambulation distance.       Progress Note (antalgic gait with decreased step length and flat foot strike):       Current: walked 250ft in clinic today Independently, with min gait deviations/antalgia.                      Progressing 6/27/18  PLAN  [x]  Upgrade activities as tolerated     [x]  Continue plan of care  []  Update interventions per flow sheet       []  Discharge due to:_  []  Other:    Amie Brambila PT,  6/27/2018  10:00 AM    Future Appointments  Date Time Provider Fabiola Flynn   6/27/2018 4:30 PM Amie Brambila PT [facility]   7/2/2018 5:00 PM Sherrill Valero PTA [facility]   7/5/2018 5:30 PM [facility] [place] [facility]   7/9/2018 5:00 PM Sherrill Vlaero PTA [facility]   7/11/2018 5:00 PM Kandis Roth DPT [facility]   7/16/2018 5:00 PM Sherrill Valero PTA [facility]   7/18/2018 5:00 PM Sherrill Valero PTA [facility]   7/23/2018 5:00 PM [facility] PT [facility]   7/25/2018 5:00 PM [facility] PT [facility]   7/30/2018 5:00 PM Sherrill Valero PTA [facility]

## 2018-06-29 ENCOUNTER — APPOINTMENT (OUTPATIENT)
Dept: PHYSICAL THERAPY | Age: 73
End: 2018-06-29
Payer: MEDICARE

## 2018-07-02 ENCOUNTER — HOSPITAL ENCOUNTER (OUTPATIENT)
Dept: PHYSICAL THERAPY | Age: 73
Discharge: HOME | End: 2018-07-02
Payer: MEDICARE

## 2018-07-02 PROCEDURE — 97016 VASOPNEUMATIC DEVICE THERAPY: CPT

## 2018-07-02 PROCEDURE — 97140 MANUAL THERAPY 1/> REGIONS: CPT

## 2018-07-02 PROCEDURE — 97110 THERAPEUTIC EXERCISES: CPT

## 2018-07-02 NOTE — PROGRESS NOTES
PT DAILY TREATMENT NOTE - [facility]     Patient Name: Sridevi Messing  Date:2018  : 1945  [x]  Patient  Verified  Payor: VA MEDICARE / Plan: VA MEDICARE PART A & B / Product Type: Medicare /    In time: 5:00pm Out time: 6:15  Total Treatment Time (min) 75  Total Timed Codes (min): 50  1:1 Treatment Time [facility] only):30  Visit #: 3  of 10    Treatment Area: S/P total knee replacement, right [Z96.651]    SUBJECTIVE  Pain Level IN:(0-10 scale): 210  Pain Level OUT: (0-10 scale) post treatment: 1/10    Any medication changes, allergies to medications, adverse drug reactions, diagnosis change, or new procedure performed?: [x] No    [] Yes (see summary sheet for update)  Subjective functional status/changes:   [] No changes reported: Today it is swollen and sore from being up since 6 am and working - I would like the cold machine to my knee and ankle again today - that seems to really help.       OBJECTIVE    Modality rationale: decrease edema, decrease inflammation and decrease pain to improve the patients ability to ambulate without RW   Min Type Additional Details    [] Estim:  [x]Unatt       [x]IFC  []Premod                        []Other:  [x]w/ice   [x]w/heat  Position: in long sitting  Location: to (R) knee    [] Estim: []Att    []TENS instruct  []NMES                    []Other:  []w/US   []w/ice   []w/heat  Position:  Location:    []  Traction: [] Cervical       []Lumbar                       [] Prone          []Supine                       []Intermittent   []Continuous Lbs:  [] before manual  [] after manual    []  Ultrasound: []Continuous   [] Pulsed                           []1MHz   []3MHz W/cm2:  Location:    []  Iontophoresis with dexamethasone         Location: [] Take home patch   [] In clinic    []  Ice     []  heat  []  Ice massage  []  Laser   []  Anodyne Position:  Location:    []  Laser with stim  []  Other:  Position:  Location:   15+5 set up  [x]  Vasopneumatic Device to knee and ankle Pressure:       [] lo [x] med [] hi   Temperature: [x] lo [] med [] hi   [x] Skin assessment post-treatment:  [x]intact [x]redness- no adverse reaction    []redness  adverse reaction:       40/35 min Therapeutic Exercise:  [x] See flow sheet : 5 min NC for warm up on          Rationale: increase ROM, increase strength, improve coordination and improve balance to improve the patients ability to ambulate and perform normal ADL's      15 min Manual Therapy: In supine PROM and stretching into flexion, Scar massage, medial and inferior patellar mobes, supine Knee extension mobes, massage for swelling         Rationale: decrease pain, increase ROM, increase tissue extensibility, decrease edema  and decrease trigger points to (R) knee       With   [] TE   [] TA   [] neuro   [] other: Patient Education: [x] Review HEP    [] Progressed/Changed HEP based on:   [] positioning   [] body mechanics   [] transfers   [] heat/ice application    [] other     Objective/Functional Measures: continue with POC to achieve new goals stated below - to increase ambulation and working tolerance to allow patient to complete a full day and week at work           ASSESSMENT/Changes in Function   Patient will continue to benefit from skilled PT services to modify and progress therapeutic interventions, address functional mobility deficits, address ROM deficits, address strength deficits, analyze and address soft tissue restrictions and instruct in home and community integration to attain remaining goals. []  See Plan of Care  [x]  See progress note/recertification  []  See Discharge Summary         Progress towards goals / Updated goals: 6/27/18  Continued goals:  1. Patient will increase Right knee AROM to 0-120 to improve stair negotiation      Progress Note (AROM 0-115*):       Current:      2.  Patient will demonstrate ability to ascend and descend 4 8 inch stairs with reciprocal pattern to demonstrate increased Right quadriceps strength to 5/5 for eccentric lowering. Progress Note (4+/5 Right quad strength and one  HR required to descend stairs. ):       Current:      3. Patient will tolerate 10 minutes of ambulation on treadmill with efficient gait to include equal step length B and proper heel strike B to allow patient to return to community ambulation distance.       Progress Note: Improvements noted with normalized gait pattern, however tolerance to ambulation is less than 10 minutes      Current:        PLAN  [x]  Upgrade activities as tolerated     [x]  Continue plan of care  []  Update interventions per flow sheet       []  Discharge due to:_  []  Other:    Abdoul Elmore PTA,  7/2/2018  10:00 AM    Future Appointments  Date Time Provider Fabiola Flynn   7/5/2018 5:30 PM [facility] [place] [facility]   7/9/2018 5:00 PM Abdoul Elmore PTA [facility]   7/11/2018 5:00 PM CHAGO WhaleyT [facility]   7/16/2018 5:00 PM Abdoul Elmore PTA [facility]   7/18/2018 5:00 PM Abdoul Elmore PTA [facility]   7/23/2018 5:00 PM [facility] PT [facility]   7/25/2018 5:00 PM [facility]   7/30/2018 5:00 PM Abdoul Elmore PTA [facility]

## 2018-07-05 ENCOUNTER — HOSPITAL ENCOUNTER (OUTPATIENT)
Dept: PHYSICAL THERAPY | Age: 73
Discharge: HOME | End: 2018-07-05
Payer: MEDICARE

## 2018-07-05 PROCEDURE — 97110 THERAPEUTIC EXERCISES: CPT

## 2018-07-05 PROCEDURE — 97016 VASOPNEUMATIC DEVICE THERAPY: CPT

## 2018-07-05 NOTE — PROGRESS NOTES
PT DAILY TREATMENT NOTE - [facility]     Patient Name: Tenisha Montemayor  Date:2018  : 1945  [x]  Patient  Verified  Payor: VA MEDICARE / Plan: VA MEDICARE PART A & B / Product Type: Medicare /    In time: 5:30pm Out time: 6:30  Total Treatment Time (min) 60  Total Timed Codes (min): 40  1:1 Treatment Time [facility] only):40  Visit #: 4  of 10    Treatment Area: S/P total knee replacement, right [Z96.651]    SUBJECTIVE  Pain Level IN:(0-10 scale): 2/10  Pain Level OUT: (0-10 scale) post treatment: 0/10    Any medication changes, allergies to medications, adverse drug reactions, diagnosis change, or new procedure performed?: [x] No    [] Yes (see summary sheet for update)  Subjective functional status/changes:   [] No changes reported:  I think next week is my last week here. My right knee still hurts when I bend it a lot and the ice helps a ton at the end of the session.      OBJECTIVE    Modality rationale: decrease edema, decrease inflammation and decrease pain to improve the patients ability to ambulate without RW   Min Type Additional Details    [] Estim:  [x]Unatt       [x]IFC  []Premod                        []Other:  [x]w/ice   [x]w/heat  Position: in long sitting  Location: to (R) knee    [] Estim: []Att    []TENS instruct  []NMES                    []Other:  []w/US   []w/ice   []w/heat  Position:  Location:    []  Traction: [] Cervical       []Lumbar                       [] Prone          []Supine                       []Intermittent   []Continuous Lbs:  [] before manual  [] after manual    []  Ultrasound: []Continuous   [] Pulsed                           []1MHz   []3MHz W/cm2:  Location:    []  Iontophoresis with dexamethasone         Location: [] Take home patch   [] In clinic    []  Ice     []  heat  []  Ice massage  []  Laser   []  Anodyne Position:  Location:    []  Laser with stim  []  Other:  Position:  Location:   15  [x]  Vasopneumatic Device to right knee and ankle  Pressure:       [] lo [x] med [] hi   Temperature: [x] lo [] med [] hi   [x] Skin assessment post-treatment:  [x]intact [x]redness- no adverse reaction    []redness  adverse reaction:       45/40 (1:1) NC for bike min Therapeutic Exercise:  [x] See flow sheet : 5 min NC for warm up on          Rationale: increase ROM, increase strength, improve coordination and improve balance to improve the patients ability to ambulate and perform normal ADL's       min Manual Therapy:          Rationale: decrease pain, increase ROM, increase tissue extensibility, decrease edema  and decrease trigger points to (R) knee       With   [] TE   [] TA   [] neuro   [] other: Patient Education: [x] Review HEP    [] Progressed/Changed HEP based on:   [] positioning   [] body mechanics   [] transfers   [] heat/ice application    [] other     Objective/Functional Measures:   Initiated TM today to achieve LTG below-stood with pt, monitored such, and instructed on importance of starting a daily walking program  Added LE lunge matrix for RLE CKC strengthening        ASSESSMENT/Changes in Function   Pt tolerated new CKC therex well without LOB/pain. Pt only able to ambulate 3 min on TM at .5incline at . 9mph before having to stop secondary to endurance. She was instructed to begin a short, daily walking program within her physical limits once daily for LE strengthening and aerobic conditioning. Pt advised to perform in the early morning or evening when the temperature cools down. She was with verbalized understanding. Patient will continue to benefit from skilled PT services to modify and progress therapeutic interventions, address functional mobility deficits, address ROM deficits, address strength deficits, analyze and address soft tissue restrictions and instruct in home and community integration to attain remaining goals.      []  See Plan of Care  [x]  See progress note/recertification  []  See Discharge Summary         Progress towards goals / Updated goals: 6/27/18  Continued goals:  1. Patient will increase Right knee AROM to 0-120 to improve stair negotiation      Progress Note (AROM 0-115*):       Current: 0-110 at end of session 7/5/18      2. Patient will demonstrate ability to ascend and descend 4 8 inch stairs with reciprocal pattern to demonstrate increased Right quadriceps strength to 5/5 for eccentric lowering. Progress Note (4+/5 Right quad strength and one  HR required to descend stairs. ):       Current:      3. Patient will tolerate 10 minutes of ambulation on treadmill with efficient gait to include equal step length B and proper heel strike B to allow patient to return to community ambulation distance. Progress Note: Improvements noted with normalized gait pattern, however tolerance to ambulation is less than 10 minutes      Current: PROGRESSING 3min at . 9mph on TM 7/5/18       PLAN  [x]  Upgrade activities as tolerated     [x]  Continue plan of care  []  Update interventions per flow sheet       []  Discharge due to:_  []  Other:    Sparkle Heath, PT,  7/5/2018  10:00 AM    Future Appointments  Date Time Provider Fabiola Flynn   7/9/2018 5:00 PM Romel Alas, PTA [facility]   7/11/2018 5:00 PM CHAGO DugganT [facility]   7/16/2018 5:00 PM Romel Alas, PTA [facility]   7/18/2018 5:00 PM Romel Alas, PTA [facility]   7/23/2018 5:00 PM [facility] PT [place] [facility]   7/25/2018 5:00 PM [facility] PT [facility]   7/30/2018 5:00 PM Romel Alas, PTA [facility]

## 2018-07-09 ENCOUNTER — HOSPITAL ENCOUNTER (OUTPATIENT)
Dept: PHYSICAL THERAPY | Age: 73
Discharge: HOME | End: 2018-07-09
Payer: MEDICARE

## 2018-07-09 PROCEDURE — 97140 MANUAL THERAPY 1/> REGIONS: CPT

## 2018-07-09 PROCEDURE — 97110 THERAPEUTIC EXERCISES: CPT

## 2018-07-09 PROCEDURE — 97016 VASOPNEUMATIC DEVICE THERAPY: CPT

## 2018-07-09 NOTE — PROGRESS NOTES
PT DAILY TREATMENT NOTE - [facility]     Patient Name: Hannah Estrella  Date:2018  : 1945  [x]  Patient  Verified  Payor: VA MEDICARE / Plan: VA MEDICARE PART A & B / Product Type: Medicare /    In time: 5:00 Out time: 6:10  Total Treatment Time (min) 70  Total Timed Codes (min): 50  1:1 Treatment Time [facility] only):30  Visit #: 5  of 10    Treatment Area: S/P total knee replacement, right [Z96.651]    SUBJECTIVE  Pain Level IN:(0-10 scale): 3/10  Pain Level OUT: (0-10 scale) post treatment: 1/10    Any medication changes, allergies to medications, adverse drug reactions, diagnosis change, or new procedure performed?: [x] No    [] Yes (see summary sheet for update)  Subjective functional status/changes:   [] No changes reported:  I am exhausted today. I think that my next visit will be my last visit. I don't think there is anything else that can be done with therapy. I just need to continue to work on my exercises.  I am going to miss therapy because I always feel better after I leave here     OBJECTIVE    Modality rationale: decrease edema, decrease inflammation and decrease pain to improve the patients ability to ambulate without RW   Min Type Additional Details    [] Estim:  [x]Unatt       [x]IFC  []Premod                        []Other:  [x]w/ice   [x]w/heat  Position: in long sitting  Location: to (R) knee    [] Estim: []Att    []TENS instruct  []NMES                    []Other:  []w/US   []w/ice   []w/heat  Position:  Location:    []  Traction: [] Cervical       []Lumbar                       [] Prone          []Supine                       []Intermittent   []Continuous Lbs:  [] before manual  [] after manual    []  Ultrasound: []Continuous   [] Pulsed                           []1MHz   []3MHz W/cm2:  Location:    []  Iontophoresis with dexamethasone         Location: [] Take home patch   [] In clinic    []  Ice     []  heat  []  Ice massage  []  Laser   []  Anodyne Position:  Location:    []  Laser with stim  []  Other:  Position:  Location:   15+5 set up  [x]  Vasopneumatic Device to knee and ankle  Pressure:       [] lo [x] med [] hi   Temperature: [x] lo [] med [] hi   [x] Skin assessment post-treatment:  [x]intact [x]redness- no adverse reaction    []redness  adverse reaction:       35/30 min Therapeutic Exercise:  [x] See flow sheet : 5 min NC for warm up   Added t-band clams, and t-band circuit with blue t-band        Rationale: increase ROM, increase strength, improve coordination and improve balance to improve the patients ability to ambulate and perform normal ADL's      15 min Manual Therapy: In supine PROM and stretching into flexion, Scar massage, medial and inferior patellar mobes, supine Knee extension mobes, massage for swelling         Rationale: decrease pain, increase ROM, increase tissue extensibility, decrease edema  and decrease trigger points to (R) knee       With   [] TE   [] TA   [] neuro   [] other: Patient Education: [x] Review HEP    [] Progressed/Changed HEP based on:   [] positioning   [] body mechanics   [] transfers   [] heat/ice application    [] other     Objective/Functional Measures: Will reassess all goals next visit secondary to cert periods end on 4/08/02 - patient needs to continue with HEP to allow patient to improve overall endurance and tolerance with work and home ADL to decrease c/o of fatigue, increase edema and soreness in the knee joint        ASSESSMENT/Changes in Function   Patient will continue to benefit from skilled PT services to modify and progress therapeutic interventions, address functional mobility deficits, address ROM deficits, address strength deficits, analyze and address soft tissue restrictions and instruct in home and community integration to attain remaining goals. []  See Plan of Care  [x]  See progress note/recertification  []  See Discharge Summary         Progress towards goals / Updated goals: 6/27/18  Continued goals:  1.  Patient will increase Right knee AROM to 0-120 to improve stair negotiation      Progress Note (AROM 0-115*):       Current:      2. Patient will demonstrate ability to ascend and descend 4 8 inch stairs with reciprocal pattern to demonstrate increased Right quadriceps strength to 5/5 for eccentric lowering. Progress Note (4+/5 Right quad strength and one  HR required to descend stairs. ):       Current:      3. Patient will tolerate 10 minutes of ambulation on treadmill with efficient gait to include equal step length B and proper heel strike B to allow patient to return to community ambulation distance.       Progress Note: Improvements noted with normalized gait pattern, however tolerance to ambulation is less than 10 minutes      Current:        PLAN  [x]  Upgrade activities as tolerated     [x]  Continue plan of care  []  Update interventions per flow sheet       []  Discharge due to:_  []  Other:    Rosa Churchill PTA,  7/9/2018  10:00 AM    Future Appointments  Date Time Provider Fabiola Flynn   7/11/2018 5:00 PM Christine Jaramillo DPT [facility]   7/16/2018 5:00 PM Rosa Churchill PTA [facility]   7/18/2018 5:00 PM Rosa Churchill PTA [facility]   7/23/2018 5:00 PM [facility] [place] [facility]   7/25/2018 5:00 PM [facility] [place] [facility]   7/30/2018 5:00 PM Rosa Churchill PTA [facility]

## 2018-07-11 ENCOUNTER — HOSPITAL ENCOUNTER (OUTPATIENT)
Dept: PHYSICAL THERAPY | Age: 73
Discharge: HOME | End: 2018-07-11
Payer: MEDICARE

## 2018-07-11 PROCEDURE — G8980 MOBILITY D/C STATUS: HCPCS | Performed by: PHYSICAL THERAPIST

## 2018-07-11 PROCEDURE — G8979 MOBILITY GOAL STATUS: HCPCS | Performed by: PHYSICAL THERAPIST

## 2018-07-11 PROCEDURE — 97110 THERAPEUTIC EXERCISES: CPT | Performed by: PHYSICAL THERAPIST

## 2018-07-11 NOTE — PROGRESS NOTES
PT DAILY TREATMENT NOTE - [facility]     Patient Name: Wilton Perdomo  Date:2018  : 1945  [x]  Patient  Verified  Payor: [place] MEDICARE / Plan: [place] MEDICARE PART A & B / Product Type: Medicare /    In time: 5:00 Out time: 540  Total Treatment Time (min) 40  Total Timed Codes (min): 40  1:1 Treatment Time [facility] only):40  Visit #: 6  of 10    Treatment Area: S/P total knee replacement, right [Z96.651]    SUBJECTIVE  Pain Level IN:(0-10 scale): 310  Pain Level OUT: (0-10 scale) post treatment: 310    Any medication changes, allergies to medications, adverse drug reactions, diagnosis change, or new procedure performed?: [x] No    [] Yes (see summary sheet for update)  Subjective functional status/changes:   [] No changes reported:  Pt reports 80% improvements since SOC. She reports that she is still having numbness, pain, and swelling in that right knee. Functional limitations include reciprocally down the stairs secondary to pain. She notes improvement in pain and endurance as she is able to walk 30 min in the morning now. OBJECTIVE      40 min Therapeutic Exercise:  [x] See flow sheet :PT reassessment, updated HEP        Rationale: increase ROM, increase strength, improve coordination and improve balance to improve the patients ability to ambulate and perform normal ADL's       With   [x] TE   [] TA   [] neuro   [] other: Patient Education: [x] Review HEP    [x] Progressed/Changed HEP based on: prep for DC  [] positioning   [] body mechanics   [] transfers   [] heat/ice application    [] other     Objective/Functional Measures:  AROM: 0-115 deg  FOTO improved to 66/100  Pt is able to ascend 6\" therapy steps with reciprocal pattern however, decent with step too secondary to pain and decreased eccentric strength         ASSESSMENT/Changes in Function   Upon reassessment, pt presents with improvements in AROM and overall strength however has leveled out with progress in PT at this time.  She reports compliance with HEP and is ready to continue to maintain/maximize gains in PT with updated HEP. []  See Plan of Care  []  See progress note/recertification  [x]  See Discharge Summary         Progress towards goals / Updated goals: 6/27/18  Continued goals:  1. Patient will increase Right knee AROM to 0-120 to improve stair negotiation      Progress Note (AROM 0-115*):       Current: maintained 0-115 deg 7/11/18      2. Patient will demonstrate ability to ascend and descend 4 8 inch stairs with reciprocal pattern to demonstrate increased Right quadriceps strength to 5/5 for eccentric lowering. Progress Note (4+/5 Right quad strength and one  HR required to descend stairs. ):       Current: maintained since last assessment. Met 7/11/18      3. Patient will tolerate 10 minutes of ambulation on treadmill with efficient gait to include equal step length B and proper heel strike B to allow patient to return to community ambulation distance. Progress Note: Improvements noted with normalized gait pattern, however tolerance to ambulation is less than 10 minutes      Current: met, pt able to walk 30 min  7/11/18       PLAN  []  Upgrade activities as tolerated     []  Continue plan of care  []  Update interventions per flow sheet       [x]  Discharge due to: maintained status, IND with HEP  []  Other:    Lorie Qureshi DPT,  7/11/2018  604 PM    No future appointments.

## 2018-07-11 NOTE — PROGRESS NOTES
[address] [facility] AT [facility]  [address] [place] [address],[address], Ankit Catalan  Phone [phone]  Fax [phone]  DISCHARGE SUMMARY FOR PHYSICAL THERAPY          Patient Name: Danya Quezada : 1945   Treatment/Medical Diagnosis: S/P total knee replacement, right [Z96.651]   Onset Date: 18    Referral Source: Donald Greenberg MD [facility]): 18   Prior Hospitalization: See Medical History Provider #: [id number]   Prior Level of Function: IND   Comorbidities: Arthritis, HTN, BMI >30   Medications: Verified on Patient Summary List   Visits from [facility]: 33 Missed Visits: 0       Goal/Measure of Progress Goal Met? Continued goals:  1. Patient will increase Right knee AROM to 0-120 to improve stair negotiation      Progress Note (AROM 0-115*):       Current: maintained 0-115 deg 18      2. Patient will demonstrate ability to ascend and descend 4 8 inch stairs with reciprocal pattern to demonstrate increased Right quadriceps strength to 5/5 for eccentric lowering.        Progress Note (4+/5 Right quad strength and one  HR required to descend stairs. ):       Current: maintained since last assessment. Met 18      3. Patient will tolerate 10 minutes of ambulation on treadmill with efficient gait to include equal step length B and proper heel strike B to allow patient to return to community ambulation distance.      Progress Note: Improvements noted with normalized gait pattern, however tolerance to ambulation is less than 10 minutes      Current: met, pt able to walk 30 min  18    Key Functional Changes/Progress: Pt reports 80% improvements since [facility]. She reports that she is still having numbness, pain, and swelling in that right knee. Functional limitations include reciprocally down the stairs secondary to pain.  She notes improvement in pain and endurance as she is able to walk 30 min in the morning now.       Objective/Functional Measures:  AROM: 0-115 deg  FOTO improved to 66/100  Pt is able to ascend 6\" therapy steps with reciprocal pattern however, decent with step too secondary to pain and decreased eccentric strength    G-Codes (GP): Mobility:   Goal  CJ= 20-39%  D/C  CJ= 20-39%. The severity rating is based on the FOTO Score  Assessments/Recommendations: Discontinue therapy. Progressing towards or have reached established goals. If you have any questions/comments please contact us directly at [phone]. Thank you for allowing us to assist in the care of your patient.     Therapist Signature: Joycelyn Bianchi DPT Date: 7/11/2018   Reporting Period: 4/16/18-7/11/16 Time: 6:05 PM

## 2018-07-16 ENCOUNTER — APPOINTMENT (OUTPATIENT)
Dept: PHYSICAL THERAPY | Age: 73
End: 2018-07-16
Payer: MEDICARE

## 2018-07-18 ENCOUNTER — APPOINTMENT (OUTPATIENT)
Dept: PHYSICAL THERAPY | Age: 73
End: 2018-07-18
Payer: MEDICARE

## 2018-07-23 ENCOUNTER — APPOINTMENT (OUTPATIENT)
Dept: PHYSICAL THERAPY | Age: 73
End: 2018-07-23
Payer: MEDICARE

## 2018-07-25 ENCOUNTER — APPOINTMENT (OUTPATIENT)
Dept: PHYSICAL THERAPY | Age: 73
End: 2018-07-25
Payer: MEDICARE

## 2018-07-30 ENCOUNTER — APPOINTMENT (OUTPATIENT)
Dept: PHYSICAL THERAPY | Age: 73
End: 2018-07-30
Payer: MEDICARE

## 2018-09-06 ENCOUNTER — IMPORTED ENCOUNTER (OUTPATIENT)
Dept: URBAN - METROPOLITAN AREA CLINIC 1 | Facility: CLINIC | Age: 73
End: 2018-09-06

## 2018-09-06 PROBLEM — H25.813: Noted: 2018-09-06

## 2018-09-06 PROBLEM — H16.143: Noted: 2018-09-06

## 2018-09-06 PROBLEM — H02.831: Noted: 2018-09-06

## 2018-09-06 PROBLEM — H04.123: Noted: 2018-09-06

## 2018-09-06 PROBLEM — H02.834: Noted: 2018-09-06

## 2018-09-06 PROCEDURE — 92014 COMPRE OPH EXAM EST PT 1/>: CPT

## 2018-09-06 NOTE — PATIENT DISCUSSION
1.  Cataract OU: Observe for now without intervention. The patient was advised to contact us if any change or worsening of vision2. VALENTIN w/ PEK OU- Use ATs BID OU Routinely. 3.  Dermatochalasis OU UL's  - Follow with no intervention at this time. Letter to PCP Return for an appointment in 1 yr 27 with Dr. Krystle Hart.

## 2018-10-15 ENCOUNTER — HOSPITAL ENCOUNTER (OUTPATIENT)
Dept: MAMMOGRAPHY | Age: 73
Discharge: HOME | End: 2018-10-15
Attending: INTERNAL MEDICINE
Payer: MEDICARE

## 2018-10-15 DIAGNOSIS — Z12.31 VISIT FOR SCREENING MAMMOGRAM: ICD-10-CM

## 2018-10-15 PROCEDURE — 77063 BREAST TOMOSYNTHESIS BI: CPT

## 2018-12-20 ENCOUNTER — APPOINTMENT (OUTPATIENT)
Dept: PHYSICAL THERAPY | Age: 73
End: 2018-12-20
Payer: MEDICARE

## 2019-01-02 ENCOUNTER — APPOINTMENT (OUTPATIENT)
Dept: PHYSICAL THERAPY | Age: 74
End: 2019-01-02
Payer: MEDICARE

## 2019-01-03 ENCOUNTER — HOSPITAL ENCOUNTER (OUTPATIENT)
Dept: PHYSICAL THERAPY | Age: 74
Discharge: HOME | End: 2019-01-03
Payer: MEDICARE

## 2019-01-03 PROCEDURE — 97162 PT EVAL MOD COMPLEX 30 MIN: CPT

## 2019-01-03 PROCEDURE — 97016 VASOPNEUMATIC DEVICE THERAPY: CPT

## 2019-01-03 NOTE — PROGRESS NOTES
[address] [facility] AT [facility] [address]  Phone [phone]  Fax [phone] PLAN OF CARE / STATEMENT OF MEDICAL NECESSITY FOR PHYSICAL THERAPY SERVICES Patient Name: Ty Barnett : 1945 Medical  
Diagnosis: Right knee pain [M25.561] Treatment Diagnosis: Right knee pain Onset Date: 3/19/2018 Referral Source: Jasvir Calvillo MD [facility]): 1/3/2019 Prior Hospitalization: See medical history Provider #: [id number] Prior Level of Function: Functionally I Comorbidities: OA, HTN, BMI> 30, thyroid problems Medications: Verified on Patient Summary List  
The Plan of Care and following information is based on the information from the initial evaluation.  
================================================================================= Assessment / key information:  72year old female presents for PT evaluation with diagnosis of Right knee pain s/p Right TKR 3/19/2018. Patient did have extended PT following the TKR (first home health then outpatient PT ending at this facility in 2018). Patient reports she then continued PT per recommendation of surgeon at another facility. Patients chief complain is stiffness, soreness and sensitivity in the Right knee, specifically over the incisional site. Patient is employed full time at a desk job and is  and lives w spouse in 2 story home. Patient rates current pain as a 2/10 and reports that the pain can increase to a 10/10 with prolonged positions. Negative for red flags. FOTO = 39/100. Patient reports that the most recent episode of PT she was not able to participate in 100% due to her son being sick and hospitalized. Patient reports that her surgeon expressed that a manipulation might be needed to improve Right knee ROM if PT is not successful.   
Functional limitations include difficulty with prolonged walking, sitting or standing, difficulty with stooping, squatting or kneeling and difficulty with stairs. Clinical exam: Patient ambulates without an AD with slightly antalgic gait pattern and decreased knee flexion during swing phase on the Right. Patient ascends stairs reciprocally and descends stairs w step to pattern with HR and apprehension with descending stairs with medial collapse on the Right knee w eccentric lowering. Right knee AAROM 0-110* measured in supine. Right LE strength: Hip flexion, abduction, adduction 4/5, Hip extension 3+/5, knee flexion 4/5 and knee extension 4/5. Patient is very tender to palpation along Right knee surgical scar. Scar is slightly raised with fair scar mobility. 1+ pitting edema present in distal BLE which patient states is \"normal for her, especially at the end of the day\". Patient education performed regarding the role and expectations of PT and importance of the patient being compliant and consistent with performing her HEP.  
 
================================================================================= Eval Complexity: History: HIGH Complexity :3+ comorbidities / personal factors will impact the outcome/ POC Exam:HIGH Complexity : 4+ Standardized tests and measures addressing body structure, function, activity limitation and / or participation in recreation  Presentation: MEDIUM Complexity : Evolving with changing characteristics  Clinical Decision Making:MEDIUM Complexity : FOTO score of 26-74Overall Complexity:MEDIUM Problem List: pain affecting function, decrease ROM, decrease strength, edema affecting function, impaired gait/ balance, decrease activity tolerance and decrease flexibility/ joint mobility Treatment Plan may include any combination of the following: Therapeutic exercise, Therapeutic activities, Neuromuscular re-education, Physical agent/modality, Gait/balance training, Manual therapy and Patient education Patient / Family readiness to learn indicated by: asking questions and interest 
Persons(s) to be included in education: patient (P) Barriers to Learning/Limitations: None Measures taken:   
Patient Goal (s): Decrease pain and swelling. Patient self reported health status: good Rehabilitation Potential: fair ? Short Term Goals: To be accomplished in  3  treatments: 1. Patient will demonstrate compliance with HEP and elevation/cold pack to increase ROM and strength and manage pain and edema. 2. Patient will report decreased overall pain level to 1/10 or less during and after activity to allow for improved tolerance to prolonged walking and standing. ? Long Term Goals: To be accomplished in  10  treatments: 1. Patient will increase Right knee AROM to 0-120* to allow for normalized transfers and ADLs. 2. Patient will increase Right hip and knee strength to 4+/5 or greater to allow for improved tolerance to physical activity. 3. Patient will increase FOTO score to at least 53/100 to indicate overall improved functional mobility. 4. Patient will tolerate scar and soft tissue massage along Right knee surgical incisional scar to allow for overall decreased subjective reports of soreness. Frequency / Duration:   Patient to be seen  2-3  times per week for 10  treatments: 
Patient / Caregiver education and instruction: self care and exercises Therapist Signature: Everlean Mcardle, PT Date: 1/3/2019 Certification Period: 1/3/- 4/1/2019 Time: 5:08 PM  
=========================================================================================== I certify that the above Physical Therapy Services are being furnished while the patient is under my care. I agree with the treatment plan and certify that this therapy is necessary. Physician Signature:       Date:      Time:  Please sign and return to [facility] [place] or you may fax the signed copy to [phone]. Thank you.

## 2019-01-03 NOTE — PROGRESS NOTES
PT DAILY TREATMENT NOTE/KNEE EVAL 10-18 Patient Name: Jose Cee Date:1/3/2019 : 1945 [x]  Patient  Verified Payor: [place] MEDICARE / Plan: Gayatri De La Cruz / Product Type: Medicare / In time:420  Out time:505 Total Treatment Time (min): 45 Visit #: 1 of 10 Medicare/BCBS Only Total Timed Codes (min):  0 1:1 Treatment Time:  30 Treatment Area: Right knee pain [M25.561] SUBJECTIVE Pain Level (0-10 scale): 2/10 []constant [x]intermittent []improving []worsening []no change since onset Any medication changes, allergies to medications, adverse drug reactions, diagnosis change, or new procedure performed?: [x] No    [] Yes (see summary sheet for update) Subjective functional status/changes:    
72year old female presents for PT evaluation with diagnosis of Right knee pain s/p Right TKR 3/19/2018. Patient did have extended PT following the TKR (first home health then outpatient PT ending at this facility in 2018). Patient reports she then continued PT per recommendation of surgeon at another facility. Patients chief complain is stiffness, soreness and sensitivity in the Right knee, specifically over the incisional site. Patient is employed full time at a desk job and is  and lives w spouse in 2 story home. Patient rates current pain as a 2/10 and reports that the pain can increase to a 10/10 with prolonged positions. Negative for red flags. FOTO = 39/100. Patient reports that the most recent episode of PT she was not able to participate in 100% due to her son being sick and hospitalized. Patient reports that her surgeon expressed that a manipulation might be needed to improve Right knee ROM if PT is not successful. Functional limitations include difficulty with prolonged walking, sitting or standing, difficulty with stooping, squatting or kneeling and difficulty with stairs.   
 
 
OBJECTIVE/EXAMINATION 
 Modality rationale: decrease inflammation and decrease pain to improve the patients ability to improve tolerance to ambulation Type Additional Details  
[] Estim:  []Unatt       []IFC  []Premod []Other:  []w/ice   []w/heat Position: Location:  
[] Estim: []Att    []TENS instruct  []NMES []Other:  []w/US   []w/ice   []w/heat Position: Location:  
[]  Traction: [] Cervical       []Lumbar 
                     [] Prone          []Supine []Intermittent   []Continuous Lbs: 
[] before manual 
[] after manual  
[]  Ultrasound: []Continuous   [] Pulsed []1MHz   []3MHz Location: 
W/cm2:  
[]  Iontophoresis with dexamethasone Location: [] Take home patch  
[] In clinic  
[]  Ice     []  heat 
[]  Ice massage 
[]  Laser  
[]  Anodyne Position: Location:  
[]  Laser with stim 
[]  Other: Position: Location:  
[x]  Vasopneumatic Device  15 minutes  Pressure:       [] lo [x] med [] hi  
Temperature: [x] lo [] med [] hi  
[x] Skin assessment post-treatment:  [x]intact []redness- no adverse reaction 
  []redness  adverse reaction:  
 
30 min []Eval                  []Re-Eval  
 
       
With 
 [] TE 
 [] TA 
 [] neuro 
 [] other: Patient Education: [x] Review HEP [] Progressed/Changed HEP based on:  
[] positioning   [] body mechanics   [] transfers   [] heat/ice application   
[] other:   
 
 
Physical Therapy Evaluation - Knee Clinical exam: 
 Patient ambulates without an AD with slightly antalgic gait pattern and decreased knee flexion during swing phase on the Right. Patient ascends stairs reciprocally and descends stairs w step to pattern with HR and apprehension with descending stairs with medial collapse on the Right knee w eccentric lowering. Right knee AAROM 0-110* measured in supine. Right LE strength: Hip flexion, abduction, adduction 4/5, Hip extension 3+/5, knee flexion 4/5 and knee extension 4/5. Patient is very tender to palpation along Right knee surgical scar. Scar is slightly raised with fair scar mobility. 1+ pitting edema present in distal BLE which patient states is \"normal for her, especially at the end of the day\". Pain Level (0-10 scale) post treatment: 2/10 ASSESSMENT/Changes in Function: Patient education performed regarding the role and expectations of PT and importance of the patient being compliant and consistent with performing her HEP. Patient should benefit from an episode of skilled PT to address above functional limitations and clinical deficits to improve quality of life and return to PLOF. [x]  See Plan of Care 
[]  See progress note/recertification 
[]  See Discharge Summary Progress towards goals / Updated goals: · Short Term Goals: To be accomplished in  3  treatments: 1. Patient will demonstrate compliance with HEP and elevation/cold pack to increase ROM and strength and manage pain and edema. 2. Patient will report decreased overall pain level to 1/10 or less during and after activity to allow for improved tolerance to prolonged walking and standing.  
  
· Long Term Goals: To be accomplished in  10  treatments: 1. Patient will increase Right knee AROM to 0-120* to allow for normalized transfers and ADLs. 2. Patient will increase Right hip and knee strength to 4+/5 or greater to allow for improved tolerance to physical activity. 3. Patient will increase FOTO score to at least 53/100 to indicate overall improved functional mobility. 4. Patient will tolerate scar and soft tissue massage along Right knee surgical incisional scar to allow for overall decreased subjective reports of soreness. PLAN 
[]  Upgrade activities as tolerated     []  Continue plan of care 
[]  Update interventions per flow sheet      
[]  Discharge due to:_ 
[x]  Other:   Patient to be seen  2-3  times per week for 10  treatments: Ella Holliday, PT 1/3/2019  5:09 PM

## 2019-01-05 ENCOUNTER — IMPORTED ENCOUNTER (OUTPATIENT)
Dept: URBAN - METROPOLITAN AREA CLINIC 1 | Facility: CLINIC | Age: 74
End: 2019-01-05

## 2019-01-05 PROBLEM — H52.03: Noted: 2019-01-05

## 2019-01-05 PROBLEM — H52.4: Noted: 2019-01-05

## 2019-01-05 PROCEDURE — S0621 ROUTINE OPHTHALMOLOGICAL EXA: HCPCS

## 2019-01-05 NOTE — PATIENT DISCUSSION
1.  Hyperopia: Rx was given for corrective spectacles if indicated. 2.  Presbyopia: Rx was given for corrective spectacles if indicated. 3.  (Cataract OU: Observe for now without intervention. The patient was advised to contact us if any change or worsening of vision)4.  (VALENTIN w/ PEK OU- Continue ATs BID OU Routinely.) 5. (Dermatochalasis OU UL's  - Follow with no intervention at this time. )6. Return for an appointment for Vlad /minna in September with Dr. Leandro Jennings.

## 2019-01-14 ENCOUNTER — HOSPITAL ENCOUNTER (OUTPATIENT)
Dept: PHYSICAL THERAPY | Age: 74
Discharge: HOME | End: 2019-01-14
Payer: MEDICARE

## 2019-01-14 PROCEDURE — 97140 MANUAL THERAPY 1/> REGIONS: CPT

## 2019-01-14 PROCEDURE — 97110 THERAPEUTIC EXERCISES: CPT

## 2019-01-14 NOTE — PROGRESS NOTES
PT DAILY TREATMENT NOTE - [facility]  Patient Name: Seema Whitt Date:2019 : 1945 [x]  Patient  Verified Payor: [place] MEDICARE / Plan: Gayatri Greenberg [facility] / Product Type: Medicare / In time:5:30 Out time:6:25 Total Treatment Time (min): 55 Total Timed Codes (min): 50 
1:1 Treatment Time ( only): 45 Visit #: 2 of 8-10 Treatment Area: Right knee pain [M25.561] SUBJECTIVE Pain Level IN:(0-10 scale): 2/10 Pain Level OUT: (0-10 scale) post treatment: 0/10 Any medication changes, allergies to medications, adverse drug reactions, diagnosis change, or new procedure performed?: [x] No    [] Yes (see summary sheet for update) Subjective functional status/changes:   [] No changes reported Really the only thing that bothers me is the pain that I have in my knee - pretty much all the time - it is over the front of the knee and over the scar OBJECTIVEModality rationale: decrease inflammation, decrease pain and increase tissue extensibility to improve the patients ability to descend stairs without pain Type Additional Details  
[] Estim:  []Unatt       []IFC  []Premod []Other:  []w/ice   []w/heat Position: Location:  
[] Estim: []Att    []TENS instruct  []NMES []Other:  []w/US   []w/ice   []w/heat Position: Location:  
[]  Traction: [] Cervical       []Lumbar 
                     [] Prone          []Supine []Intermittent   []Continuous Lbs: 
[] before manual 
[] after manual  
[]  Ultrasound: []Continuous   [] Pulsed []1MHz   []3MHz W/cm2: 
Location:  
[]  Iontophoresis with dexamethasone Location: [] Take home patch  
[] In clinic  
[]  Ice     []  heat 
[]  Ice massage 
[]  Laser  
[]  Anodyne Position: Location:  
[]  Laser with stim 
[]  Other:  Position: Location:  
[]  Vasopneumatic Device Pressure:       [] lo [] med [] hi  
Temperature: [] lo [] med [] hi  
 [] Skin assessment post-treatment:  []intact []redness- no adverse reaction 
  []redness  adverse reaction:  
 
 
35/30 min Therapeutic Exercise:  [x] See flow sheet :  
5 min NC for warm up  
first follow up visit since initial evaluation - initiated POC per flow sheet Rationale: increase ROM and increase strength to improve the patients ability to ambulate with less pain 15+5 min Manual Therapy:   IASTM performed. Patient is a candidate for cupping and without contraindications at this time. Patient was instructed in the indications/contraindications of cupping technique. The patient was educated in its purpose and risks/benefits. Patient was advised that redness is normal after technique is performed and that redness will disappear typically within one week. Dynamic cupping technique performed to the (R) knee incision site and anterior aspect of the incision site. Performed PROM in sitting EOB, supine and prone to increase knee flexion/extension Rationale: decrease pain, increase ROM, increase tissue extensibility and decrease edema  to (R) knee to achieve all goals stated below With 
 [] TE 
 [] TA 
 [] neuro 
 [] other: Patient Education: [x] Review HEP [] Progressed/Changed HEP based on:  
[] positioning   [] body mechanics   [] transfers   [] heat/ice application   
[] other:   
 
Objective/Functional Measures with Therapist Assessment Noted with Response to Therapy Session: 
 first follow up visit since initial evaluation - initiated POC per flow sheet Patient presents with 0 -120 knee flexion in supine after manual  
Attempted to perform cupping over the anterior aspect of the knee and incision site to assist with alleviating pain over the incision site/scar ASSESSMENT/Changes in Function: 
 
Patient will continue to benefit from skilled PT services to modify and progress therapeutic interventions, address functional mobility deficits, address ROM deficits, address strength deficits and analyze and address soft tissue restrictions to attain remaining goals. [x]  See Plan of Care 
[]  See progress note/recertification 
[]  See Discharge Summary Progress towards goals / Updated goals: · Short Term Goals: To be accomplished in  3  treatments: 1. Patient will demonstrate compliance with HEP and elevation/cold pack to increase ROM and strength and manage pain and edema. 2. Patient will report decreased overall pain level to 1/10 or less during and after activity to allow for improved tolerance to prolonged walking and standing.  
  
· Long Term Goals: To be accomplished in  10  treatments: 1. Patient will increase Right knee AROM to 0-120* to allow for normalized transfers and ADLs. 2. Patient will increase Right hip and knee strength to 4+/5 or greater to allow for improved tolerance to physical activity. 3. Patient will increase FOTO score to at least 53/100 to indicate overall improved functional mobility. 4. Patient will tolerate scar and soft tissue massage along Right knee surgical incisional scar to allow for overall decreased subjective reports of soreness. PLAN [x]  Upgrade activities as tolerated     [x]  Continue plan of care 
[]  Update interventions per flow sheet      
[]  Discharge due to:_ 
[]  Other:_   
 
Kerline Penn PTA 1/14/2019  5:45 PM 
 
Future Appointments Date Time Provider Fabiola Flynn 1/17/2019  5:45 PM [facility]

## 2019-01-17 ENCOUNTER — HOSPITAL ENCOUNTER (OUTPATIENT)
Dept: PHYSICAL THERAPY | Age: 74
Discharge: HOME | End: 2019-01-17
Payer: MEDICARE

## 2019-01-17 PROCEDURE — 97110 THERAPEUTIC EXERCISES: CPT

## 2019-01-17 NOTE — PROGRESS NOTES
PT DAILY TREATMENT NOTE - [facility]  Patient Name: Andrea Weiner Date:2019 : 1945 [x]  Patient  Verified Payor: [place] MED[place] / Plan: Gayatri De La Cruz / Product Type: Medicare / In time:5:30 Out time:6:30 Total Treatment Time (min): 60 Total Timed Codes (min): 60 
1:1 Treatment Time ( only):  30 Visit #: 3 of 8-10 Treatment Area: Right knee pain [M25.561] SUBJECTIVE Pain Level IN:(0-10 scale): 310 Pain Level OUT: (0-10 scale) post treatment: 1-2/10 Any medication changes, allergies to medications, adverse drug reactions, diagnosis change, or new procedure performed?: [x] No    [] Yes (see summary sheet for update) Subjective functional status/changes:   [] No changes reported I don't think that the cupping did anything but make my knee bruise up OBJECTIVEModality rationale: decrease inflammation, decrease pain and increase tissue extensibility to improve the patients ability to descend stairs without pain Type Additional Details  
[] Estim:  []Unatt       []IFC  []Premod []Other:  []w/ice   []w/heat Position: Location:  
[] Estim: []Att    []TENS instruct  []NMES []Other:  []w/US   []w/ice   []w/heat Position: Location:  
[]  Traction: [] Cervical       []Lumbar 
                     [] Prone          []Supine []Intermittent   []Continuous Lbs: 
[] before manual 
[] after manual  
[]  Ultrasound: []Continuous   [] Pulsed []1MHz   []3MHz W/cm2: 
Location:  
[]  Iontophoresis with dexamethasone Location: [] Take home patch  
[] In clinic  
[]  Ice     []  heat 
[]  Ice massage 
[x]  Laser  
[]  Anodyne Position: 
12 min 7 J/cmLocation to the (R) knee anterior and over the incision site :  
[]  Laser with stim 
[]  Other:  Position: Location:  
[]  Vasopneumatic Device Pressure:       [] lo [] med [] hi  
Temperature: [] lo [] med [] hi  
 [] Skin assessment post-treatment:  []intact []redness- no adverse reaction 
  []redness  adverse reaction:  
 
 
48/43 min Therapeutic Exercise:  [x] See flow sheet : 5 min NC for warm up Rationale: increase ROM and increase strength to improve the patients ability to ambulate with less pain  
 
 
 
 min Manual Therapy:   held Rationale: decrease pain, increase ROM, increase tissue extensibility and decrease edema  to (R) knee to achieve all goals stated below With 
 [] TE 
 [] TA 
 [] neuro 
 [] other: Patient Education: [x] Review HEP [] Progressed/Changed HEP based on:  
[] positioning   [] body mechanics   [] transfers   [] heat/ice application   
[] other:   
 
Objective/Functional Measures with Therapist Assessment Noted with Response to Therapy Session: 
 no relieve reported with cupping Attempted laser to anterior surface of knee/incision site to assist with decreasing possible nerve pain causing pain 
Gave patient information for consult of incision site pain with plastic surgeon ASSESSMENT/Changes in Function: 
 
Patient will continue to benefit from skilled PT services to modify and progress therapeutic interventions, address functional mobility deficits, address ROM deficits, address strength deficits and analyze and address soft tissue restrictions to attain remaining goals. [x]  See Plan of Care 
[]  See progress note/recertification 
[]  See Discharge Summary Progress towards goals / Updated goals: · Short Term Goals: To be accomplished in  3  treatments: 1. Patient will demonstrate compliance with HEP and elevation/cold pack to increase ROM and strength and manage pain and edema. 2. Patient will report decreased overall pain level to 1/10 or less during and after activity to allow for improved tolerance to prolonged walking and standing.  
  
· Long Term Goals: To be accomplished in  10  treatments: 1. Patient will increase Right knee AROM to 0-120* to allow for normalized transfers and ADLs. 2. Patient will increase Right hip and knee strength to 4+/5 or greater to allow for improved tolerance to physical activity. 3. Patient will increase FOTO score to at least 53/100 to indicate overall improved functional mobility. 4. Patient will tolerate scar and soft tissue massage along Right knee surgical incisional scar to allow for overall decreased subjective reports of soreness. PLAN [x]  Upgrade activities as tolerated     [x]  Continue plan of care 
[]  Update interventions per flow sheet      
[]  Discharge due to:_ 
[]  Other:_   
 
Dario An PTA 1/17/2019  5:45 PM 
 
Future Appointments Date Time Provider Fabiola Flynn 1/21/2019  6:15 PM Pantera Dennis PTA [facility]  
1/24/2019  3:30 PM Jana Irving PT [facility]  
1/31/2019  6:15 PM Pantera Dennis PTA [facility]  
2/4/2019  4:00 PM Pantera Dennis PTA [facility]  
2/7/2019  4:00 PM Serina Garrison PTA [facility]

## 2019-01-21 ENCOUNTER — HOSPITAL ENCOUNTER (OUTPATIENT)
Dept: PHYSICAL THERAPY | Age: 74
Discharge: HOME | End: 2019-01-21
Payer: MEDICARE

## 2019-01-21 PROCEDURE — 97140 MANUAL THERAPY 1/> REGIONS: CPT

## 2019-01-22 NOTE — PROGRESS NOTES
PT DAILY TREATMENT NOTE - [facility]  Patient Name: Kristin Chavez Date:2019 : 1945 [x]  Patient  Verified Payor: [place] MEDICARE / Plan: Gayatri Greenberg [facility] / Product Type: Medicare / In time:6:15 Out time:6:55 Total Treatment Time (min): 40 Total Timed Codes (min): 25 
1:1 Treatment Time ( only):  15 Visit #: 4 of 8-10 Treatment Area: Right knee pain [M25.561] SUBJECTIVE Pain Level IN:(0-10 scale): 310 Pain Level OUT: (0-10 scale) post treatment: 2/10 Any medication changes, allergies to medications, adverse drug reactions, diagnosis change, or new procedure performed?: [x] No    [] Yes (see summary sheet for update) Subjective functional status/changes:   [] No changes reported I went and saw the plastic surgeon and he does not want to try the cortisone injection under the scar because he does not want to damage the knee replacement - so he wants me to keep doing PT and scar massage and ultrasound or laser - whatever you think that would help for the pain OBJECTIVEModality rationale: decrease inflammation, decrease pain and increase tissue extensibility to improve the patients ability to descend stairs without pain Type Additional Details  
[] Estim:  []Unatt       []IFC  []Premod []Other:  []w/ice   []w/heat Position: Location:  
[] Estim: []Att    []TENS instruct  []NMES []Other:  []w/US   []w/ice   []w/heat Position: Location:  
[]  Traction: [] Cervical       []Lumbar 
                     [] Prone          []Supine []Intermittent   []Continuous Lbs: 
[] before manual 
[] after manual  
[]  Ultrasound: []Continuous   [] Pulsed []1MHz   []3MHz W/cm2: 
Location:  
[]  Iontophoresis with dexamethasone Location: [] Take home patch  
[] In clinic  
[]  Ice     []  heat 
[]  Ice massage 
[x]  Laser Total time 15 minutes Position: 5 min 8 J/cm with handheld laser/light probe -Location to the (R) knee anterior and over the incision site 10 min with 10 J/cm - laser/light pads to knee :  
[]  Laser with stim 
[]  Other:  Position: Location:  
[]  Vasopneumatic Device Pressure:       [] lo [] med [] hi  
Temperature: [] lo [] med [] hi  
[] Skin assessment post-treatment:  []intact []redness- no adverse reaction 
  []redness  adverse reaction:  
 
 
10 min Therapeutic Exercise:  [x] See flow sheet : 10 min NC for warm up and self stretching Rationale: increase ROM and increase strength to improve the patients ability to ambulate with less pain 15 min Manual Therapy: Moderate pressure applied with scar massage -   
Rationale: decrease pain, increase ROM, increase tissue extensibility and decrease edema  to (R) knee to achieve all goals stated below With 
 [] TE 
 [] TA 
 [] neuro 
 [] other: Patient Education: [x] Review HEP [] Progressed/Changed HEP based on:  
[] positioning   [] body mechanics   [] transfers   [] heat/ice application   
[] other:   
 
Objective/Functional Measures with Therapist Assessment Noted with Response to Therapy Session: 
 patient did report relief of symptoms with laser/light probe last visit Patient saw plastic surgeon - no cortisone injection secondary to possible damage that could happen to implant - he recommended scar massage and ultrasound Performed more moderate scar massage with increase discomfort to the distal aspect of the incision site - patient reported that she will not be able to perform that to herself because of her tolerance level Perform laser/light probe - directly over the incision site with probe and then with pads to entire knee. Will assess the effects of both next visit Stressed the importance of attempting to perform I scar massage to decrease pain and scar tissue under the incision site that is causing increase pain ASSESSMENT/Changes in Function: Patient will continue to benefit from skilled PT services to modify and progress therapeutic interventions, address functional mobility deficits, address ROM deficits, address strength deficits and analyze and address soft tissue restrictions to attain remaining goals. [x]  See Plan of Care 
[]  See progress note/recertification 
[]  See Discharge Summary Progress towards goals / Updated goals: · Short Term Goals: To be accomplished in  3  treatments: 1. Patient will demonstrate compliance with HEP and elevation/cold pack to increase ROM and strength and manage pain and edema. 2. Patient will report decreased overall pain level to 1/10 or less during and after activity to allow for improved tolerance to prolonged walking and standing.  
  
· Long Term Goals: To be accomplished in  10  treatments: 1. Patient will increase Right knee AROM to 0-120* to allow for normalized transfers and ADLs. 2. Patient will increase Right hip and knee strength to 4+/5 or greater to allow for improved tolerance to physical activity. 3. Patient will increase FOTO score to at least 53/100 to indicate overall improved functional mobility. 4. Patient will tolerate scar and soft tissue massage along Right knee surgical incisional scar to allow for overall decreased subjective reports of soreness. PLAN [x]  Upgrade activities as tolerated     [x]  Continue plan of care 
[]  Update interventions per flow sheet      
[]  Discharge due to:_ 
[]  Other:_   
 
Champ Saez, [facility] 1/21/2019  5:45 PM 
 
Future Appointments Date Time Provider Fabiola Flynn 1/24/2019  3:30 PM Rambo Matos, PT [facility]  
1/31/2019  6:15 PM [facility]  
2/4/2019  4:00 PM Rita Zarco, PTA [facility]  
2/7/2019  4:00 PM Iris Kent, PTA [facility]

## 2019-01-24 ENCOUNTER — HOSPITAL ENCOUNTER (OUTPATIENT)
Dept: PHYSICAL THERAPY | Age: 74
Discharge: HOME | End: 2019-01-24
Payer: MEDICARE

## 2019-01-24 PROCEDURE — 97140 MANUAL THERAPY 1/> REGIONS: CPT

## 2019-01-24 NOTE — PROGRESS NOTES
PT DAILY TREATMENT NOTE - [facility]  Patient Name: Rebeka Gallegos Date:2019 : 1945 [x]  Patient  Verified Payor: [place] MED[place]ARE / Plan: Gayatri Greenberg [facility] / Product Type: Medicare / In time: 347 Out time: 415 Total Treatment Time (min): 28 Total Timed Codes (min): 15 
1:1 Treatment Time ( only):  15 Visit #: 5 of 8-10 Treatment Area: Right knee pain [M25.561] SUBJECTIVE Pain Level IN:(0-10 scale): 210 Pain Level OUT: (0-10 scale) post treatment: 1/10 Any medication changes, allergies to medications, adverse drug reactions, diagnosis change, or new procedure performed?: [x] No    [] Yes (see summary sheet for update) Subjective functional status/changes:   [] No changes reported I had slightly more pain the day after I was here last time but today I feel good. OBJECTIVEModality rationale: decrease inflammation, decrease pain and increase tissue extensibility to improve the patients ability to descend stairs without pain Type Additional Details  
[] Estim:  []Unatt       []IFC  []Premod []Other:  []w/ice   []w/heat Position: Location:  
[] Estim: []Att    []TENS instruct  []NMES []Other:  []w/US   []w/ice   []w/heat Position: Location:  
[]  Traction: [] Cervical       []Lumbar 
                     [] Prone          []Supine []Intermittent   []Continuous Lbs: 
[] before manual 
[] after manual  
[]  Ultrasound: []Continuous   [] Pulsed []1MHz   []3MHz W/cm2: 
Location:  
[]  Iontophoresis with dexamethasone Location: [] Take home patch  
[] In clinic  
[]  Ice     []  heat 
[]  Ice massage 
[x]  Laser Total time 13 minutes Position: 
13 min with 13 J/cm - light pads to knee :  
[]  Laser with stim 
[]  Other:  Position: Location:  
[]  Vasopneumatic Device Pressure:       [] lo [] med [] hi  
Temperature: [] lo [] med [] hi  
 [] Skin assessment post-treatment:  []intact []redness- no adverse reaction 
  []redness  adverse reaction:  
 
 
Held min Therapeutic Exercise:  [] See flow sheet :   
Rationale: increase ROM and increase strength to improve the patients ability to ambulate with less pain 15 min Manual Therapy: Moderate pressure applied with scar massage -   
Rationale: decrease pain, increase ROM, increase tissue extensibility and decrease edema  to (R) knee to achieve all goals stated below With 
 [] TE 
 [] TA 
 [] neuro 
 [] other: Patient Education: [x] Review HEP [] Progressed/Changed HEP based on:  
[] positioning   [] body mechanics   [] transfers   [] heat/ice application   
[] other:   
 
Objective/Functional Measures with Therapist Assessment Noted with Response to Therapy Session: 
 patient did report relief of symptoms with laser/light pads last visit Continued decreased scar mobility noted along distal incision site with increased reports of tenderness to scar massage over distal area. ASSESSMENT/Changes in Function: 
Patient will continue to benefit from skilled PT services to modify and progress therapeutic interventions, address functional mobility deficits, address ROM deficits, address strength deficits and analyze and address soft tissue restrictions to attain remaining goals. []  See Plan of Care 
[]  See progress note/recertification 
[]  See Discharge Summary Progress towards goals / Updated goals: · Short Term Goals: To be accomplished in  3  treatments: 1. Patient will demonstrate compliance with HEP and elevation/cold pack to increase ROM and strength and manage pain and edema. 2. Patient will report decreased overall pain level to 1/10 or less during and after activity to allow for improved tolerance to prolonged walking and standing.  
  
· Long Term Goals: To be accomplished in  10  treatments: 1. Patient will increase Right knee AROM to 0-120* to allow for normalized transfers and ADLs. 2. Patient will increase Right hip and knee strength to 4+/5 or greater to allow for improved tolerance to physical activity. 3. Patient will increase FOTO score to at least 53/100 to indicate overall improved functional mobility. 4. Patient will tolerate scar and soft tissue massage along Right knee surgical incisional scar to allow for overall decreased subjective reports of soreness. PLAN [x]  Upgrade activities as tolerated     [x]  Continue plan of care 
[]  Update interventions per flow sheet      
[]  Discharge due to:_ 
[x]  Other:_  Monitor effectiveness of modalities and scar massage in pain control Eleazar Osborn, PT 1/24/2019  5:45 PM 
 
Future Appointments Date Time Provider Fabiola Flynn 1/31/2019  6:15 PM [facility]  
2/4/2019  4:00 PM Jacqueline Eldridge [facility]  
2/7/2019  4:00 PM Daron Sherman PTA [facility]

## 2019-01-31 ENCOUNTER — APPOINTMENT (OUTPATIENT)
Dept: PHYSICAL THERAPY | Age: 74
End: 2019-01-31
Payer: MEDICARE

## 2019-02-04 ENCOUNTER — HOSPITAL ENCOUNTER (OUTPATIENT)
Dept: PHYSICAL THERAPY | Age: 74
Discharge: HOME | End: 2019-02-04
Payer: MEDICARE

## 2019-02-04 PROCEDURE — 97140 MANUAL THERAPY 1/> REGIONS: CPT

## 2019-02-04 NOTE — PROGRESS NOTES
PT DAILY TREATMENT NOTE - [facility]  Patient Name: Rebeka Gallegos Date:2019 : 1945 [x]  Patient  Verified Payor: [place] MEDICARE / Plan: Gyaatri Greenberg [facility] / Product Type: Medicare / In time: 4:01 Out time: 4:28 Total Treatment Time (min): 27 Total Timed Codes (min): 12 
1:1 Treatment Time ( [address] only):  12 Visit #: 6 of 8-10 Treatment Area: Right knee pain [M25.561] SUBJECTIVE Pain Level IN:(0-10 scale): 2/10 Pain Level OUT: (0-10 scale) post treatment: 1/10 Any medication changes, allergies to medications, adverse drug reactions, diagnosis change, or new procedure performed?: [x] No    [] Yes (see summary sheet for update) Subjective functional status/changes:   [] No changes reported It maybe a little bit better but it still bothers me a lot with you performing that scar massage OBJECTIVEModality rationale: decrease inflammation, decrease pain and increase tissue extensibility to improve the patients ability to descend stairs without pain Type Additional Details  
[] Estim:  []Unatt       []IFC  []Premod []Other:  []w/ice   []w/heat Position: Location:  
[] Estim: []Att    []TENS instruct  []NMES []Other:  []w/US   []w/ice   []w/heat Position: Location:  
[]  Traction: [] Cervical       []Lumbar 
                     [] Prone          []Supine []Intermittent   []Continuous Lbs: 
[] before manual 
[] after manual  
[]  Ultrasound: []Continuous   [] Pulsed []1MHz   []3MHz W/cm2: 
Location:  
[]  Iontophoresis with dexamethasone Location: [] Take home patch  
[] In clinic  
[]  Ice     []  heat 
[]  Ice massage 
[x]  Laser Total time 15 minutes Position: 
15 min with 13 J/cm - light pads to knee :  
[]  Laser with stim 
[]  Other:  Position: Location:  
[]  Vasopneumatic Device Pressure:       [] lo [] med [] hi  
Temperature: [] lo [] med [] hi  
 [] Skin assessment post-treatment:  []intact []redness- no adverse reaction 
  []redness  adverse reaction:  
 
 
Held min Therapeutic Exercise:  [] See flow sheet :   
Rationale: increase ROM and increase strength to improve the patients ability to ambulate with less pain 12 min Manual Therapy: Moderate pressure applied with scar massage -   
Rationale: decrease pain, increase ROM, increase tissue extensibility and decrease edema  to (R) knee to achieve all goals stated below With 
 [] TE 
 [] TA 
 [] neuro 
 [] other: Patient Education: [x] Review HEP [] Progressed/Changed HEP based on:  
[] positioning   [] body mechanics   [] transfers   [] heat/ice application   
[] other:   
 
Objective/Functional Measures with Therapist Assessment Noted with Response to Therapy Session: 
Patient continues to present with increase pain/discomfort during scar massage Will request dry needling orders to assist with decreasing internal scar tissue adhesion ASSESSMENT/Changes in Function: 
Patient will continue to benefit from skilled PT services to modify and progress therapeutic interventions, address functional mobility deficits, address ROM deficits, address strength deficits and analyze and address soft tissue restrictions to attain remaining goals. [x]  See Plan of Care 
[]  See progress note/recertification 
[]  See Discharge Summary Progress towards goals / Updated goals: · Short Term Goals: To be accomplished in  3  treatments: 1. Patient will demonstrate compliance with HEP and elevation/cold pack to increase ROM and strength and manage pain and edema. 2. Patient will report decreased overall pain level to 1/10 or less during and after activity to allow for improved tolerance to prolonged walking and standing.  
  
· Long Term Goals: To be accomplished in  10  treatments: 1. Patient will increase Right knee AROM to 0-120* to allow for normalized transfers and ADLs. 2. Patient will increase Right hip and knee strength to 4+/5 or greater to allow for improved tolerance to physical activity. 3. Patient will increase FOTO score to at least 53/100 to indicate overall improved functional mobility. 4. Patient will tolerate scar and soft tissue massage along Right knee surgical incisional scar to allow for overall decreased subjective reports of soreness. PLAN [x]  Upgrade activities as tolerated     [x]  Continue plan of care 
[]  Update interventions per flow sheet      
[]  Discharge due to:_ 
[x]  Other:_  Monitor effectiveness of modalities and scar massage in pain control Elizabeth Arana PTA 2/4/2019  5:45 PM 
 
Future Appointments Date Time Provider Fabiola Flynn 2/7/2019  4:00 PM Lizz Albarado PTA [facility]

## 2019-02-05 NOTE — PROGRESS NOTES
Request for use of Dry Needling/Intramuscular Manual Therapy Patient: Mikaela Hartmann     Referral Source: Juan Ramon Erickson MD 
Diagnosis: Right knee pain [M25.561]      : 1945 Date of initial visit: 1/3/19   Attended visits: 6  Missed Visits: 0 Based on findings from the physical therapy examination and evaluation, the evaluating therapist believes the patient, Mikaela Hartmann  would benefit from including Dry Needling as part of the plan of care. Dry needling is a treatment technique utilized in conjunction with other PT interventions to inactivate myofascial trigger points and the pain and dysfunction they cause. Dry Needling is an advanced procedure that requires additional training including greater than 54 hours of intensive course work. Physical Therapists at [address] [place] [address] are certified through [address] [place] [address] courses for their education and are certified to perform treatments. PROCEDURE: 
? Solid filament sterile needle (typically 0.3mm/30 gauge) inserted into a trigger point ? Repeated movements inactivate the trigger points, taking 30-60 seconds per site ? Typically consists of 1 dry needling session per week and a possible second treatment including muscle re-education, flexibility, strengthening and other manual techniques to facilitate the benefits of dry needling BENEFITS: 
? Inactivation of trigger points ? Decreased pain ? Increased muscle length ? Improved movement patterns ? Restoration of function POTENTIAL RISKS: 
? Post-needling soreness ? Infection ? Bruising/bleeding ? Penetration of a nerve ? Pneumothorax All treating PTs have been thoroughly educated in avoiding adverse reactions If you agree with this recommendation, please sign this form and fax it to us at [phone]. If you have questions or concerns regarding dry needling or any other treatment we may be providing, please contact us at [phone]. Thank you for allowing us to assist in the care of your patient. Deborah Rosales, PTA    2/4/2019 7:11 PM  
NOTE TO PHYSICIAN:  PLEASE COMPLETE THE ORDERS BELOW AND  
FAX TO [facility]: [phone] If you are unable to process this request in 24 hours please contact our office:  
[phone] ? I have read the above request and AGREE to the recommendation of including dry needling as part of the plan of care. ? I have read the above request and DO NOT AGREE to including dry needling as part of the plan of care. ? I have read the above report and request that my patient continue therapy with the following changes/special instructions: 
________________________________________________________________________ Physicians signature: _______________________________________________ Date: ______________Time:_______________

## 2019-02-07 ENCOUNTER — APPOINTMENT (OUTPATIENT)
Dept: PHYSICAL THERAPY | Age: 74
End: 2019-02-07
Payer: MEDICARE

## 2019-02-18 ENCOUNTER — HOSPITAL ENCOUNTER (OUTPATIENT)
Dept: PHYSICAL THERAPY | Age: 74
Discharge: HOME | End: 2019-02-18
Payer: MEDICARE

## 2019-02-18 PROCEDURE — 97110 THERAPEUTIC EXERCISES: CPT | Performed by: PHYSICAL THERAPIST

## 2019-02-18 PROCEDURE — 97140 MANUAL THERAPY 1/> REGIONS: CPT | Performed by: PHYSICAL THERAPIST

## 2019-02-18 NOTE — PROGRESS NOTES
PT DAILY TREATMENT NOTE - [facility]  Patient Name: Minerva Gomez Date:2019 : 1945 [x]  Patient  Verified Payor: [place] MED[place]ARE / Plan: Gayatri Greenberg [facility] / Product Type: Medicare / In time: 1245  Out time: 150 Total Treatment Time (min): 65 Total Timed Codes (min): 55 
1:1 Treatment Time ( only):  55 Visit #: 7 of 8-10 Treatment Area: Right knee pain [M25.561] SUBJECTIVE Pain Level IN:(0-10 scale):410 Pain Level OUT: (0-10 scale) post treatment: 1/10 Any medication changes, allergies to medications, adverse drug reactions, diagnosis change, or new procedure performed?: [x] No    [] Yes (see summary sheet for update) Subjective functional status/changes:   [] No changes reported Pt reports 50% improvement since [facility]. She reports improvement in overall pain levels when she is up and moving and ROM. Functional limitations include walking >15 min and going up and down steps. OBJECTIVEModality rationale: decrease inflammation, decrease pain and increase tissue extensibility to improve the patients ability to descend stairs without pain Type Additional Details  
[] Estim:  []Unatt       []IFC  []Premod []Other:  []w/ice   []w/heat Position: Location:  
[] Estim: []Att    []TENS instruct  []NMES []Other:  []w/US   []w/ice   []w/heat Position: Location:  
[]  Traction: [] Cervical       []Lumbar 
                     [] Prone          []Supine []Intermittent   []Continuous Lbs: 
[] before manual 
[] after manual  
[]  Ultrasound: []Continuous   [] Pulsed []1MHz   []3MHz W/cm2: 
Location:  
[]  Iontophoresis with dexamethasone Location: [] Take home patch  
[] In clinic  
[]  Ice     [x]  heat 
[]  Ice massage 
[]  Laser Position: semi reclined on the R knee  
 :  
[]  Laser with stim 
[]  Other:  Position: Location: []  Vasopneumatic Device Pressure:       [] lo [] med [] hi  
Temperature: [] lo [] med [] hi  
[] Skin assessment post-treatment:  []intact []redness- no adverse reaction 
  []redness  adverse reaction:  
 
 
25 min Therapeutic Exercise:  [] See flow sheet : PT reassessment Rationale: increase ROM and increase strength to improve the patients ability to ambulate with less pain 30 min Manual Therapy: Moderate pressure applied with scar massage -   
Rationale: decrease pain, increase ROM, increase tissue extensibility and decrease edema  to (R) knee to achieve all goals stated below With 
 [] TE 
 [] TA 
 [] neuro 
 [] other: Patient Education: [x] Review HEP [] Progressed/Changed HEP based on:  
[] positioning   [] body mechanics   [] transfers   [] heat/ice application   
[] other:   
 
Dry Needling Procedure Note Dry Needle Session Number:  1 Procedure: An intramuscular manual therapy (dry needling) and a neuro-muscular re-education treatment was done to deactivate myofascial trigger points, with a 15/30 gauge solid filament needle, under aseptic technique. Indication(s): [] Muscle spasms [x] Myalgia/Myositis  [] Muscle cramps  
   [] Muscle imbalances [] TMD (TMJ) [x] Myofascial pain & dysfunction 
   [] Other: __ Chart reviewed for the following: 
Jose FLORES DPT, have reviewed the medical history, summary list and precautions/contraindications for [employer] TIME OUT performed immediately prior to start of procedure: 
1240 PM (enter time the timeout was completed) Richi Cordero DPT, have performed the following reviews on [employer] prior to the start of the session:     
[x] Patient was identified by name and date of birth   
[x] Agreement on all muscles being treated was verified  
[x] Purpose of dry needling, side effects, possible complications, risks and benefits were explained to the patient [x] Procedure site(s) verified [x] Patient was positioned for comfort and draped for privacy [x] Informed Consent was signed (initial visit) and verified verbally (subsequent visits) [x] Patient was instructed on the need to report the use of blood thinners and/or immunosuppressant medications [x] How to respond to possible adverse effects of treatment 
[x] Self treatment of post needling soreness: ice, heat (moist heat, heat wraps) and stretching 
[x] Opportunity was given to ask any questions, all questions were answered Treatment: The following muscles were treated today: 
 
Right: Incision, QUAD Left:   
 
Patients response to todays treatment:  
[x]  LTRs  []  Muscle Relaxation  [x]  Pain Relief  []  Decreased Tinnitus 
[]  Decreased HAs []  Post needling soreness []  Increased ROM []  Other:   
 
 
Objective/Functional Measures B hip strength: flexion: 5/5, abduction/extension: 4+/5 AROM of the R knee: 0-120 deg FOTO: increased to 50/100 from 38/100 ASSESSMENT/Changes in Function: 
Upon reassessment, pt presents with improvements in ROM and strength of the R LE. She continues to be limited with functional mobility and activity tolerance secondary to pain due to scar adhesions and noted tightness in the R Quad. Patient will continue to benefit from skilled PT services to modify and progress therapeutic interventions, address functional mobility deficits, address ROM deficits, address strength deficits and analyze and address soft tissue restrictions to attain remaining goals. Progress towards goals / Updated goals: · Short Term Goals: To be accomplished in  3  treatments: 1. Patient will demonstrate compliance with HEP and elevation/cold pack to increase ROM and strength and manage pain and edema. Partial compliant 2/18/19 2.  Patient will report decreased overall pain level to 1/10 or less during and after activity to allow for improved tolerance to prolonged walking and standing. progressing 2/18/19 
  
· Long Term Goals: To be accomplished in  10  treatments: 1. Patient will increase Right knee AROM to 0-120* to allow for normalized transfers and ADLs. Met 2/18/19 2. Patient will increase Right hip and knee strength to 4+/5 or greater to allow for improved tolerance to physical activity. Met 2/18/19 3. Patient will increase FOTO score to at least 53/100 to indicate overall improved functional mobility. progressing 2/18/19 4. Patient will tolerate scar and soft tissue massage along Right knee surgical incisional scar to allow for overall decreased subjective reports of soreness. met 2/18/19 PLAN 
[]  Upgrade activities as tolerated     [x]  Continue plan of care 
[]  Update interventions per flow sheet      
[]  Discharge due to:_ 
[x]  Other:_ Continue PT 1x/week for 4 weeks to assess effects of IMT Cynthia Pemberton DPT 2/18/2019  5:45 PM 
 
Future Appointments Date Time Provider Fabiola Flynn 2/26/2019  4:30 PM [facility] [place] [address] [place] [address] [facility]

## 2019-02-19 NOTE — PROGRESS NOTES
[address] [facility] AT [facility] [address], [place] [address]  Phone [phone]  Fax [phone] PROGRESS NOTE Patient Name: Meghann Salamanca : 1945 Treatment/Medical Diagnosis: Right knee pain [M25.561] Referral Source: Crisoforo Prader, MD    
Date of Initial Visit: 1/3/19 Attended Visits: 7 Missed Visits: 0  
SUMMARY OF TREATMENT 
PT seen for R knee pain for 7 sessions. Therapy included manual therapy to improve scar mobility and soft tissue extensibility to improve walking tolerance. CURRENT STATUS Pt reports 50% improvement since [facility]. She reports improvement in overall pain levels when she is up and moving and ROM. Functional limitations include walking >15 min and going up and down steps. Upon reassessment, pt presents with improvements in ROM and strength of the R LE. She continues to be limited with functional mobility and activity tolerance secondary to pain due to scar adhesions and noted tightness in the R Quad. Patient will continue to benefit from skilled PT services to modify and progress therapeutic interventions, address functional mobility deficits, address ROM deficits, address strength deficits and analyze and address soft tissue restrictions to attain remaining goals. Objective/Functional Measures B hip strength: flexion: 5/5, abduction/extension: 4+/5 AROM of the R knee: 0-120 deg FOTO: increased to 50/100 from 38/100 Goal/Measure of Progress Goal Met? · Short Term Goals: To be accomplished in  3  treatments: 1. Patient will demonstrate compliance with HEP and elevation/cold pack to increase ROM and strength and manage pain and edema. Partial compliant 19 2. Patient will report decreased overall pain level to 1/10 or less during and after activity to allow for improved tolerance to prolonged walking and standing. progressing 19 
  
· Long Term Goals: To be accomplished in  10  treatments: 1. Patient will increase Right knee AROM to 0-120* to allow for normalized transfers and ADLs. Met 2/18/19 2. Patient will increase Right hip and knee strength to 4+/5 or greater to allow for improved tolerance to physical activity. Met 2/18/19 3. Patient will increase FOTO score to at least 53/100 to indicate overall improved functional mobility. progressing 2/18/19 4. Patient will tolerate scar and soft tissue massage along Right knee surgical incisional scar to allow for overall decreased subjective reports of soreness. met 2/18/19 New Goals to be achieved in __4__  weeks: 1. Patient will increase Right hip and knee strength to 5/5 or greater to allow for improved tolerance to physical activity 2. Patient will report decreased overall pain level to 1/10 or less during and after activity to allow for improved tolerance to prolonged walking and standing 3. Patient will increase FOTO score to at least 53/100 to indicate overall improved functional mobility RECOMMENDATIONS Continue PT 1x/week for 4 weeks to assess effects of IMT and progress towards long-term goals. If you have any questions/comments please contact us directly at [phone]. Thank you for allowing us to assist in the care of your patient. Therapist Signature: Sudheer Reyez DPT Date: 2/19/2019 Time: 8:12 AM  
NOTE TO PHYSICIAN:  PLEASE COMPLETE THE ORDERS BELOW AND [facility]X [facility] [place]: [phone]. If you are unable to process this request in 24 hours please contact our office: [phone]. 
 
___ I have read the above report and request that my patient continue as recommended.  
___ I have read the above report and request that my patient continue therapy with the following changes/special instructions:_________________________________________________________  
___ I have read the above report and request that my patient be discharged from therapy. Physician Signature:       Date:      Time:

## 2019-02-26 ENCOUNTER — HOSPITAL ENCOUNTER (OUTPATIENT)
Dept: PHYSICAL THERAPY | Age: 74
Discharge: HOME | End: 2019-02-26
Payer: MEDICARE

## 2019-02-26 PROCEDURE — 97140 MANUAL THERAPY 1/> REGIONS: CPT

## 2019-02-26 PROCEDURE — 97016 VASOPNEUMATIC DEVICE THERAPY: CPT

## 2019-02-26 NOTE — PROGRESS NOTES
PT DAILY TREATMENT NOTE - [facility]  Patient Name: Jimmy Keys Date:2019 : 1945 [x]  Patient  Verified Payor: [place] MEDICARE / Plan: Gayatri Greenberg [facility] / Product Type: Medicare / In time:   Out time: 51 Total Treatment Time (min): 60 Total Timed Codes (min): 45 
1:1 Treatment Time ( only):  31 Visit #: 8 of 8-10 Treatment Area: Right knee pain [M25.561] SUBJECTIVE Pain Level IN:(0-10 scale):2/10 Pain Level OUT: (0-10 scale) post treatment: 1/10 Any medication changes, allergies to medications, adverse drug reactions, diagnosis change, or new procedure performed?: [x] No    [] Yes (see summary sheet for update) Subjective functional status/changes:   [x] No changes reported OBJECTIVEModality rationale: decrease inflammation, decrease pain and increase tissue extensibility to improve the patients ability to descend stairs without pain Type Additional Details  
[] Estim:  []Unatt       []IFC  []Premod []Other:  []w/ice   []w/heat Position: Location:  
[] Estim: []Att    []TENS instruct  []NMES []Other:  []w/US   []w/ice   []w/heat Position: Location:  
[]  Traction: [] Cervical       []Lumbar 
                     [] Prone          []Supine []Intermittent   []Continuous Lbs: 
[] before manual 
[] after manual  
[]  Ultrasound: []Continuous   [] Pulsed []1MHz   []3MHz W/cm2: 
Location:  
[]  Iontophoresis with dexamethasone Location: [] Take home patch  
[] In clinic  
[]  Ice     [x]  heat 
[]  Ice massage 
[]  Laser Position: semi reclined on the R knee  
 :  
[]  Laser with stim 
[]  Other:  Position: Location:  
[x]  Vasopneumatic Device Pressure:       [] lo [x] med [] hi  
Temperature: [x] lo [] med [] hi  
[x] Skin assessment post-treatment:  [x]intact []redness- no adverse reaction 
  []redness  adverse reaction: 14/0 (1:1) min Therapeutic Exercise:  [] See flow sheet Rationale: increase ROM and increase strength to improve the patients ability to ambulate with less pain 31/16 (-15 needle insertion time) min Manual Therapy: Moderate pressure applied with scar massage, Flexion PROM, DTM to quad:  See below for dry needling treatment areas Rationale: decrease pain, increase ROM, increase tissue extensibility and decrease edema  to (R) knee to achieve all goals stated below With 
 [] TE 
 [] TA 
 [] neuro 
 [] other: Patient Education: [x] Review HEP [] Progressed/Changed HEP based on:  
[] positioning   [] body mechanics   [] transfers   [] heat/ice application   
[] other:   
 
Dry Needling Procedure Note Dry Needle Session Number:  2 Procedure: An intramuscular manual therapy (dry needling) and a neuro-muscular re-education treatment was done to deactivate myofascial trigger points, with a 15/30 gauge solid filament needle, under aseptic technique. Indication(s): [] Muscle spasms [x] Myalgia/Myositis  [] Muscle cramps  
   [] Muscle imbalances [] TMD (TMJ) [x] Myofascial pain & dysfunction 
   [] Other: __ Chart reviewed for the following: 
I, [id number] Sarthak Dawson DPT, have reviewed the medical history, summary list and precautions/contraindications for [employer]. TIME OUT performed immediately prior to start of procedure: 
7067 PM (enter time the timeout was completed) Stevie Dawson DPT, have performed the following reviews on [employer] prior to the start of the session:     
[x] Patient was identified by name and date of birth   
[x] Agreement on all muscles being treated was verified  
[x] Purpose of dry needling, side effects, possible complications, risks and benefits were explained to the patient [x] Procedure site(s) verified 
[x] Patient was positioned for comfort and draped for privacy [x] Informed Consent was signed (initial visit) and verified verbally (subsequent visits) [x] Patient was instructed on the need to report the use of blood thinners and/or immunosuppressant medications [x] How to respond to possible adverse effects of treatment 
[x] Self treatment of post needling soreness: ice, heat (moist heat, heat wraps) and stretching 
[x] Opportunity was given to ask any questions, all questions were answered Treatment: The following muscles were treated today: 
 
Right: Incision, QUAD Left:   
 
Patients response to todays treatment:  
[x]  LTRs  []  Muscle Relaxation  [x]  Pain Relief  []  Decreased Tinnitus 
[]  Decreased HAs [x]  Post needling soreness []  Increased ROM []  Other:   
 
 
Objective/Functional Measures ASSESSMENT/Changes in Function: 
Patient presents with scar adhesions to the medial knee, at and just distal to the joint line. Patient also presented with moderate swellign to the knee and bilateral ankles. Continue manual therapy and dry needling to address myofascial pain and dysfunction. Patient will continue to benefit from skilled PT services to modify and progress therapeutic interventions, address functional mobility deficits, address ROM deficits, address strength deficits and analyze and address soft tissue restrictions to attain remaining goals. Progress towards goals / Updated goals: · Short Term Goals: To be accomplished in  3  treatments: 1. Patient will demonstrate compliance with HEP and elevation/cold pack to increase ROM and strength and manage pain and edema. Partial compliant 2/18/19 2. Patient will report decreased overall pain level to 1/10 or less during and after activity to allow for improved tolerance to prolonged walking and standing. progressing 2/18/19 
  
· Long Term Goals: To be accomplished in  10  treatments: 1. Patient will increase Right knee AROM to 0-120* to allow for normalized transfers and ADLs. Met 2/18/19 2. Patient will increase Right hip and knee strength to 4+/5 or greater to allow for improved tolerance to physical activity. Met 2/18/19 3. Patient will increase FOTO score to at least 53/100 to indicate overall improved functional mobility. progressing 2/18/19 4. Patient will tolerate scar and soft tissue massage along Right knee surgical incisional scar to allow for overall decreased subjective reports of soreness. met 2/18/19 PLAN 
[]  Upgrade activities as tolerated     [x]  Continue plan of care 
[]  Update interventions per flow sheet      
[]  Discharge due to:_ 
[x]  Other:_ Continue PT 1x/week for 4 weeks to assess effects of IMT Susan Muñoz, PT, DPT   2/26/2019  1735 PM 
 
Future Appointments Date Time Provider Fabiola Flynn 3/4/2019  5:00 PM [facility]  
3/11/2019  5:00 PM Elisa Morton DPT [facility]

## 2019-03-04 ENCOUNTER — APPOINTMENT (OUTPATIENT)
Dept: PHYSICAL THERAPY | Age: 74
End: 2019-03-04
Payer: MEDICARE

## 2019-03-05 NOTE — PERIOP NOTES
Patient accepted by Luz Marina Morton, with dual nurse skin assessment completed, see admission nurse assessment. Blood pressure 123/75, pulse 66, temperature 98 °F (36.7 °C), resp. rate 14, height 5' 2\" (1.575 m), weight 92 kg (202 lb 12.8 oz), SpO2 97 %. BPH (benign prostatic hyperplasia)

## 2019-03-11 ENCOUNTER — APPOINTMENT (OUTPATIENT)
Dept: PHYSICAL THERAPY | Age: 74
End: 2019-03-11
Payer: MEDICARE

## 2019-04-01 ENCOUNTER — APPOINTMENT (OUTPATIENT)
Dept: PHYSICAL THERAPY | Age: 74
End: 2019-04-01
Payer: MEDICARE

## 2019-04-08 ENCOUNTER — HOSPITAL ENCOUNTER (OUTPATIENT)
Dept: PHYSICAL THERAPY | Age: 74
Discharge: HOME | End: 2019-04-08
Payer: MEDICARE

## 2019-04-08 PROCEDURE — 97110 THERAPEUTIC EXERCISES: CPT | Performed by: PHYSICAL THERAPIST

## 2019-04-08 PROCEDURE — 97140 MANUAL THERAPY 1/> REGIONS: CPT | Performed by: PHYSICAL THERAPIST

## 2019-04-08 NOTE — PROGRESS NOTES
PT DAILY TREATMENT NOTE - [facility]  Patient Name: Danya Quezada Date:2019 : 1945 [x]  Patient  Verified Payor: [place] MED[place]ARE / Plan: Gayatri De La Cruz / Product Type: Medicare / In time: 415  Out time: 515 Total Treatment Time (min): 60 Total Timed Codes (min): 45 
1:1 Treatment Time ( only):  45 Visit #: 9  of 8-10 Treatment Area: Right knee pain [M25.561] SUBJECTIVE Pain Level IN:(0-10 scale):10 Pain Level OUT: (0-10 scale) post treatment: sore Any medication changes, allergies to medications, adverse drug reactions, diagnosis change, or new procedure performed?: [x] No    [] Yes (see summary sheet for update) Subjective functional status/changes:   [x] No changes reported Pt reports that her knee has been better since she has recovered from the last needling session. She followed up with MD and he recommended continuing therapy for IMT a few more weeks to decrease pain levels. Functional limitations include pain with initial sit to stand exercises and prolonged walking. OBJECTIVEModality rationale: decrease inflammation, decrease pain and increase tissue extensibility to improve the patients ability to descend stairs without pain Type Additional Details  
[] Estim:  []Unatt       []IFC  []Premod []Other:  []w/ice   []w/heat Position: Location:  
[] Estim: []Att    []TENS instruct  []NMES []Other:  []w/US   []w/ice   []w/heat Position: Location:  
[]  Traction: [] Cervical       []Lumbar 
                     [] Prone          []Supine []Intermittent   []Continuous Lbs: 
[] before manual 
[] after manual  
[]  Ultrasound: []Continuous   [] Pulsed []1MHz   []3MHz W/cm2: 
Location:  
[]  Iontophoresis with dexamethasone Location: [] Take home patch  
[] In clinic [x]  Ice     []  heat 
[]  Ice massage 
[]  Laser Position: semi reclined on the R knee  15 min  
 :  
[]  Laser with stim 
[]  Other:  Position: Location:  
[]  Vasopneumatic Device Pressure:       [] lo [] med [] hi  
Temperature: [] lo [] med [] hi  
[x] Skin assessment post-treatment:  [x]intact []redness- no adverse reaction 
  []redness  adverse reaction:  
 
 
15 min Therapeutic Exercise:  [] See flow sheet PT reassessment Rationale: increase ROM and increase strength to improve the patients ability to ambulate with less pain 30 min Manual Therapy: Technique:     
[] S/DTM []IASTM []PROM [] Passive Stretching  
[] Graston: 
[] GT 1  [] GT 2 [] GT 3 [] GT 4 [] GT 4 [] GT 5 
[] GT 6  [] Sweep [] Lella Grim [] [place]  [] Brush  
[]  Swivel []J- Stroke [] Scoop []IFraming []Manual TPR  [] TDN (see objective; actual needle insertion time not billed) []Jt manipulation:Gr I [] II []  III [] IV[] V[] Treatment/Area:    
Rationale:      decrease pain, increase ROM, increase tissue extensibility and decrease trigger points to improve patient's ability to improve sit to stand tolerance With 
 [] TE 
 [] TA 
 [] neuro 
 [] other: Patient Education: [x] Review HEP [] Progressed/Changed HEP based on:  
[] positioning   [] body mechanics   [] transfers   [] heat/ice application   
[] other:   
 
Dry Needling Procedure Note Dry Needle Session Number:  3 Procedure: An intramuscular manual therapy (dry needling) and a neuro-muscular re-education treatment was done to deactivate myofascial trigger points, with a 15/30 gauge solid filament needle, under aseptic technique. Indication(s): [] Muscle spasms [x] Myalgia/Myositis  [] Muscle cramps  
   [] Muscle imbalances [] TMD (TMJ) [x] Myofascial pain & dysfunction 
   [] Other: __ Chart reviewed for the following: 
I, [id number] Sarthak Clemons DPT, have reviewed the medical history, summary list and precautions/contraindications for Armando Ayala. TIME OUT performed immediately prior to start of procedure: 
6413 PM (enter time the timeout was completed) Janak Palacio DPT, have performed the following reviews on Luis Mckeon prior to the start of the session:     
[x] Patient was identified by name and date of birth   
[x] Agreement on all muscles being treated was verified  
[x] Purpose of dry needling, side effects, possible complications, risks and benefits were explained to the patient [x] Procedure site(s) verified 
[x] Patient was positioned for comfort and draped for privacy [x] Informed Consent was signed (initial visit) and verified verbally (subsequent visits) [x] Patient was instructed on the need to report the use of blood thinners and/or immunosuppressant medications [x] How to respond to possible adverse effects of treatment 
[x] Self treatment of post needling soreness: ice, heat (moist heat, heat wraps) and stretching 
[x] Opportunity was given to ask any questions, all questions were answered Treatment: The following muscles were treated today: 
 
Right: Incision, QUAD Left:   
 
Patients response to todays treatment:  
[x]  LTRs  []  Muscle Relaxation  [x]  Pain Relief  []  Decreased Tinnitus 
[]  Decreased HAs [x]  Post needling soreness []  Increased ROM []  Other:   
 
 
Objective/Functional Measures FOTO:  
R hip strength: abduction/extension 4+/5 R knee strength: 5/5 ASSESSMENT/Changes in Function: 
Upon reassessment, pt presents continues to be limited with functional mobility and activity tolerance secondary to pain due to scar adhesions and noted tightness in the R Quad. Pt last seen on 2/26/19 secondary to significant pain following IMT and personal reasons.  Patient will continue to benefit from skilled PT services to modify and progress therapeutic interventions, address functional mobility deficits, address ROM deficits, address strength deficits and analyze and address soft tissue restrictions to attain remaining goals. Progress towards goals / Updated goals: 1. Patient will increase Right hip and knee strength to 5/5 or greater to allow for improved tolerance to physical activity no change 4/8/19 2. Patient will report decreased overall pain level to 1/10 or less during and after activity to allow for improved tolerance to prolonged walking and standing limited 4/8/19 3. Patient will increase FOTO score to at least 53/100 to indicate overall improved functional mobility slight decrease 4/8/19 PLAN 
[]  Upgrade activities as tolerated     [x]  Continue plan of care 
[]  Update interventions per flow sheet      
[]  Discharge due to:_ 
[x]  Other:_ Continue PT 1x/week for 4 weeks to assess effects of IMT Johnnie Calle DPT,   4/8/2019  600  PM 
 
Future Appointments Date Time Provider Fabiola Flynn 4/15/2019  2:00 PM [facility]  
4/24/2019  3:00 PM Kira Almaguer DPT [facility]  
4/29/2019  3:30 PM Kira Almaguer DPT [facility]  
5/6/2019  3:30 PM Kira Almaguer DPT [facility]  
5/13/2019  3:30 PM Kira Almaguer DPT [facility]  
5/20/2019  3:30 PM Kira Almaguer DPT [facility]

## 2019-04-12 NOTE — PROGRESS NOTES
[address] PHYSICAL THERAPY AT [facility] [address]  Phone [phone]  Fax [phone] CONTINUED PLAN OF CARE/RECERTIFICATION FOR PHYSICAL THERAPY Patient Name: William Pascal : 1945 Treatment/Medical Diagnosis: Right knee pain [M25.561] Onset Date: 3/19/18 Referral Source: Aleks House MD [facility]): 1/3/19 Prior Hospitalization: See Medical History Provider #: [id number] Prior Level of Function: IND Comorbidities: OA, HTN, BMI> 30, thyroid problems Medications: Verified on Patient Summary List  
Visits from [facility]: 9 Missed Visits: Several months Goal/Measure of Progress Goal Met? 1.  Patient will increase Right hip and knee strength to 5/5 or greater to allow for improved tolerance to physical activity no change 19 2.  Patient will report decreased overall pain level to 1/10 or less during and after activity to allow for improved tolerance to prolonged walking and standing limited 19 3.  Patient will increase FOTO score to at least 53/100 to indicate overall improved functional mobility slight decrease 19 Key Functional Changes/Progress: Pt reports that her knee has been better since she has recovered from the last needling session. She followed up with MD and he recommended continuing therapy for IMT a few more weeks to decrease pain levels. Functional limitations include pain with initial sit to stand exercises and prolonged walking. Upon reassessment, pt presents continues to be limited with functional mobility and activity tolerance secondary to pain due to scar adhesions and noted tightness in the R Quad.  Pt last seen on 19 secondary to significant pain following IMT and personal reasons. Patient will continue to benefit from skilled PT services to modify and progress therapeutic interventions, address functional mobility deficits, address ROM deficits, address strength deficits and analyze and address soft tissue restrictions to attain remaining goals. Objective/Functional Measures FOTO: decreased 1 point to 49/100 R hip strength: abduction/extension 4+/5 R knee strength: 5/5 Problem List: pain affecting function, decrease strength, impaired gait/ balance, decrease ADL/ functional abilitiies and decrease transfer abilities Treatment Plan may include any combination of the following: Therapeutic exercise, Neuromuscular re-education, Physical agent/modality, Gait/balance training, Manual therapy and Patient education Patient Goal(s) has been updated and includes:    
? Goals for this certification period include and are to be achieved in   4  weeks: 1. Patient will increase Right hip and knee strength to 5/5 or greater to allow for improved tolerance to physical activity 2. Patient will report decreased overall pain level to 1/10 or less during and after activity to allow for improved tolerance to prolonged walking and standing 3. Patient will increase FOTO score to at least 53/100 to indicate overall improved functional mobility Frequency / Duration:   Patient to be seen   1   times per week for   4    weeks: 
G-Codes (GP): NA Assessments/Recommendations: Continue PT 1x/week for 4 weeks to assess effects of IMT and progress towards long-term goals.  
 If you have any questions/comments please contact us directly at [phone]. Thank you for allowing us to assist in the care of your patient. Therapist Signature: True Lopez DPT Date: 4/12/2019 Certification Period: 
Reporting Period: 4/1/19-7/1/19 2/18/19-4/8/19 Time: 8:41 AM  
NOTE TO PHYSICIAN:  PLEASE COMPLETE THE ORDERS BELOW AND FAX TO [facility]: [phone]. If you are unable to process this request in 24 hours please contact our office: [phone]. ___ I have read the above report and request that my patient continue as recommended.  
___ I have read the above report and request that my patient continue therapy with the following changes/special instructions: ________________________________________________  
___ I have read the above report and request that my patient be discharged from therapy.   
 
Physician Signature:       Date:      Time:

## 2019-04-24 ENCOUNTER — APPOINTMENT (OUTPATIENT)
Dept: PHYSICAL THERAPY | Age: 74
End: 2019-04-24
Payer: MEDICARE

## 2019-04-29 ENCOUNTER — HOSPITAL ENCOUNTER (OUTPATIENT)
Dept: PHYSICAL THERAPY | Age: 74
Discharge: HOME | End: 2019-04-29
Payer: MEDICARE

## 2019-04-29 PROCEDURE — 97140 MANUAL THERAPY 1/> REGIONS: CPT | Performed by: PHYSICAL THERAPIST

## 2019-04-29 PROCEDURE — 97014 ELECTRIC STIMULATION THERAPY: CPT | Performed by: PHYSICAL THERAPIST

## 2019-04-29 NOTE — PROGRESS NOTES
PT DAILY TREATMENT NOTE - [facility]  Patient Name: Armando Ayala Date:2019 : 1945 [x]  Patient  Verified Payor: [place] MEDICARE / Plan: Gayatri Greenberg [employer] / Product Type: Medicare / In time: 415  Out time: 500 Total Treatment Time (min): 45 Total Timed Codes (min): 30 
1:1 Treatment Time ( only):  30 Visit #:1 of 4 Treatment Area: Right knee pain [M25.561] SUBJECTIVE Pain Level IN:(0-10 scale):2/10 Pain Level OUT: (0-10 scale) post treatment: 0/10 Any medication changes, allergies to medications, adverse drug reactions, diagnosis change, or new procedure performed?: [x] No    [] Yes (see summary sheet for update) Subjective functional status/changes:   [x] No changes reported Pt reports that she felt good after last session as she had decreased pain in the R knee with standing. OBJECTIVEModality rationale: decrease inflammation, decrease pain and increase tissue extensibility to improve the patients ability to descend stairs without pain Type Additional Details [x] Estim:  [x]Unatt       [x]IFC  []Premod []Other:  []w/ice   [x]w/heat Position: semi-reclined 15 min Location: R knee   
[] Estim: []Att    []TENS instruct  []NMES []Other:  []w/US   []w/ice   []w/heat Position: Location:  
[]  Traction: [] Cervical       []Lumbar 
                     [] Prone          []Supine []Intermittent   []Continuous Lbs: 
[] before manual 
[] after manual  
[]  Ultrasound: []Continuous   [] Pulsed []1MHz   []3MHz W/cm2: 
Location:  
[]  Iontophoresis with dexamethasone Location: [] Take home patch  
[] In clinic [x]  Ice     []  heat 
[]  Ice massage 
[]  Laser Position: semi reclined on the R knee   
 :  
[]  Laser with stim 
[]  Other:  Position: Location:  
[]  Vasopneumatic Device Pressure:       [] lo [] med [] hi  
Temperature: [] lo [] med [] hi  
 [x] Skin assessment post-treatment:  [x]intact []redness- no adverse reaction 
  []redness  adverse reaction:  
 
 
 
30 min Manual Therapy: Technique:     
[] S/DTM []IASTM []PROM [] Passive Stretching  
[] Graston: 
[] GT 1  [] GT 2 [] GT 3 [] GT 4 [] GT 4 [] GT 5 
[] GT 6  [] Sweep [] Trena Sever [] [place]  [] Brush  
[]  Swivel []J- Stroke [] Scoop []IFraming []Manual TPR  [x] TDN (see objective; actual needle insertion time not billed) []Jt manipulation:Gr I [] II []  III [] IV[] V[] Treatment/Area:  Scar massage to the R knee Rationale:      decrease pain, increase ROM, increase tissue extensibility and decrease trigger points to improve patient's ability to improve sit to stand tolerance With 
 [] TE 
 [] TA 
 [] neuro 
 [] other: Patient Education: [x] Review HEP [] Progressed/Changed HEP based on:  
[] positioning   [] body mechanics   [] transfers   [] heat/ice application   
[] other:   
 
Dry Needling Procedure Note Dry Needle Session Number: 4 Procedure: An intramuscular manual therapy (dry needling) and a neuro-muscular re-education treatment was done to deactivate myofascial trigger points, with a 15/30 gauge solid filament needle, under aseptic technique. Indication(s): [] Muscle spasms [x] Myalgia/Myositis  [] Muscle cramps  
   [] Muscle imbalances [] TMD (TMJ) [x] Myofascial pain & dysfunction 
   [] Other: __ Chart reviewed for the following: 
I, [id number] Severiano Kida, DPT, have reviewed the medical history, summary list and precautions/contraindications for Lidia Carmona. TIME OUT performed immediately prior to start of procedure: 
2155 PM (enter time the timeout was completed) Maryjo Lank Severiano Kida, DPT, have performed the following reviews on Lidia Carmona prior to the start of the session:     
[x] Patient was identified by name and date of birth   
[x] Agreement on all muscles being treated was verified [x] Purpose of dry needling, side effects, possible complications, risks and benefits were explained to the patient [x] Procedure site(s) verified 
[x] Patient was positioned for comfort and draped for privacy [x] Informed Consent was signed (initial visit) and verified verbally (subsequent visits) [x] Patient was instructed on the need to report the use of blood thinners and/or immunosuppressant medications [x] How to respond to possible adverse effects of treatment 
[x] Self treatment of post needling soreness: ice, heat (moist heat, heat wraps) and stretching 
[x] Opportunity was given to ask any questions, all questions were answered Treatment: The following muscles were treated today: 
 
Right: Incision, QUAD Left:   
 
Patients response to todays treatment:  
[x]  LTRs  []  Muscle Relaxation  [x]  Pain Relief  []  Decreased Tinnitus 
[]  Decreased HAs [x]  Post needling soreness []  Increased ROM []  Other:   
 
 
Objective/Functional Measures Noted scar adhesions throughout the R knee incision ASSESSMENT/Changes in Function: Pt continues to present with significant scar adhesions throughout the R incision leading to pain with functional activities. Continue to progress as tolerated. Progress towards goals / Updated goals: 1.  Patient will increase Right hip and knee strength to 5/5 or greater to allow for improved tolerance to physical activity 2.  Patient will report decreased overall pain level to 1/10 or less during and after activity to allow for improved tolerance to prolonged walking and standingvprogressing 2/10 pain this session 4/29/19 3.  Patient will increase FOTO score to at least 53/100 to indicate overall improved functional mobility PLAN 
[]  Upgrade activities as tolerated     [x]  Continue plan of care 
[]  Update interventions per flow sheet      
[]  Discharge due to:_ 
[]  Other:_  
 
Laisha Betancur DPT,   4/29/2019  528  PM 
 
 Future Appointments Date Time Provider Fabiola Flynn 5/6/2019  3:30 PM Noe Stone DPT [facility]  
5/13/2019  3:30 PM Noe Stone DPT [facility]  
5/20/2019  3:30 PM Noe Stone DPT [facility]

## 2019-05-06 ENCOUNTER — HOSPITAL ENCOUNTER (OUTPATIENT)
Dept: PHYSICAL THERAPY | Age: 74
Discharge: HOME | End: 2019-05-06
Payer: MEDICARE

## 2019-05-06 PROCEDURE — 97140 MANUAL THERAPY 1/> REGIONS: CPT | Performed by: PHYSICAL THERAPIST

## 2019-05-06 PROCEDURE — 97014 ELECTRIC STIMULATION THERAPY: CPT | Performed by: PHYSICAL THERAPIST

## 2019-05-06 NOTE — PROGRESS NOTES
PT DAILY TREATMENT NOTE - [facility]  Patient Name: Alex Kang Date:2019 : 1945 [x]  Patient  Verified Payor: [place] MEDICARE / Plan: Terence Manzanares / Product Type: Medicare / In time:355   Out time: 80 Total Treatment Time (min): 28 Total Timed Codes (min): 13 
1:1 Treatment Time ( only): 13 Visit #:2 of 4 Treatment Area: Right knee pain [M25.561] SUBJECTIVE Pain Level IN:(0-10 scale):0/10 Pain Level OUT: (0-10 scale) post treatment: 0/10 Any medication changes, allergies to medications, adverse drug reactions, diagnosis change, or new procedure performed?: [x] No    [] Yes (see summary sheet for update) Subjective functional status/changes:   [] No changes reported Today is the best my knee has felt in a long time. OBJECTIVEModality rationale: decrease inflammation, decrease pain and increase tissue extensibility to improve the patients ability to descend stairs without pain Type Additional Details [x] Estim:  [x]Unatt       [x]IFC  []Premod []Other:  []w/ice   [x]w/heat Position: semi-reclined 15 min Location: R knee   
[] Estim: []Att    []TENS instruct  []NMES []Other:  []w/US   []w/ice   []w/heat Position: Location:  
[]  Traction: [] Cervical       []Lumbar 
                     [] Prone          []Supine []Intermittent   []Continuous Lbs: 
[] before manual 
[] after manual  
[]  Ultrasound: []Continuous   [] Pulsed []1MHz   []3MHz W/cm2: 
Location:  
[]  Iontophoresis with dexamethasone Location: [] Take home patch  
[] In clinic [x]  Ice     []  heat 
[]  Ice massage 
[]  Laser Position: semi reclined on the R knee   
 :  
[]  Laser with stim 
[]  Other:  Position: Location:  
[]  Vasopneumatic Device Pressure:       [] lo [] med [] hi  
Temperature: [] lo [] med [] hi  
[x] Skin assessment post-treatment:  [x]intact []redness- no adverse reaction 
  []redness  adverse reaction:  
 
 
 
13 min Manual Therapy: Technique:     
[] S/DTM []IASTM []PROM [] Passive Stretching  
[] Graston: 
[] GT 1  [] GT 2 [] GT 3 [] GT 4 [] GT 4 [] GT 5 
[] GT 6  [] Sweep [] Davis Dove [] [place]  [] Brush  
[]  Swivel []J- Stroke [] Scoop []IFraming []Manual TPR  [x] TDN (see objective; actual needle insertion time not billed) []Jt manipulation:Gr I [] II []  III [] IV[] V[] Treatment/Area:  Scar massage to the R knee Rationale:      decrease pain, increase ROM, increase tissue extensibility and decrease trigger points to improve patient's ability to improve sit to stand tolerance With 
 [] TE 
 [] TA 
 [] neuro 
 [] other: Patient Education: [x] Review HEP [] Progressed/Changed HEP based on:  
[] positioning   [] body mechanics   [] transfers   [] heat/ice application   
[] other:   
 
Dry Needling Procedure Note Dry Needle Session Number: 2 Procedure: An intramuscular manual therapy (dry needling) and a neuro-muscular re-education treatment was done to deactivate myofascial trigger points, with a 15/30 gauge solid filament needle, under aseptic technique. Indication(s): [] Muscle spasms [x] Myalgia/Myositis  [] Muscle cramps  
   [] Muscle imbalances [] TMD (TMJ) [x] Myofascial pain & dysfunction 
   [] Other: __ Chart reviewed for the following: 
I, [id number] Sarthak Reyes DPT, have reviewed the medical history, summary list and precautions/contraindications for Hannah Estrella. TIME OUT performed immediately prior to start of procedure: 
0881 PM (enter time the timeout was completed) Terrell Reyes DPT, have performed the following reviews on Hannah Delores prior to the start of the session:     
[x] Patient was identified by name and date of birth   
[x] Agreement on all muscles being treated was verified  
[x] Purpose of dry needling, side effects, possible complications, risks and benefits were explained to the patient [x] Procedure site(s) verified 
[x] Patient was positioned for comfort and draped for privacy [x] Informed Consent was signed (initial visit) and verified verbally (subsequent visits) [x] Patient was instructed on the need to report the use of blood thinners and/or immunosuppressant medications [x] How to respond to possible adverse effects of treatment 
[x] Self treatment of post needling soreness: ice, heat (moist heat, heat wraps) and stretching 
[x] Opportunity was given to ask any questions, all questions were answered Treatment: The following muscles were treated today: 
 
Right: Incision, Left:   
 
Patients response to todays treatment:  
[x]  LTRs  []  Muscle Relaxation  [x]  Pain Relief  []  Decreased Tinnitus 
[]  Decreased HAs [x]  Post needling soreness []  Increased ROM []  Other:   
 
 
Objective/Functional Measures Noted scar adhesions throughout the R knee incision ASSESSMENT/Changes in Function: Pt presents with decreased scar adhesions in the R knee. Continue to progress as tolerated. Progress towards goals / Updated goals: 1.  Patient will increase Right hip and knee strength to 5/5 or greater to allow for improved tolerance to physical activity 2.  Patient will report decreased overall pain level to 1/10 or less during and after activity to allow for improved tolerance to prolonged walking and standingvprogressing 2/10 pain this session 4/29/19 3.  Patient will increase FOTO score to at least 53/100 to indicate overall improved functional mobility PLAN 
[]  Upgrade activities as tolerated     [x]  Continue plan of care 
[]  Update interventions per flow sheet      
[]  Discharge due to:_ 
[]  Other:_  
 
Christine Jaramillo DPT,   5/6/2019  545  PM 
 
Future Appointments Date Time Provider Fabiola Flynn 5/13/2019  3:30 PM Gilford Punter, DPT [facility]  
5/20/2019  3:30 PM Gilford Punter, DPT [facility]

## 2019-05-13 ENCOUNTER — HOSPITAL ENCOUNTER (OUTPATIENT)
Dept: PHYSICAL THERAPY | Age: 74
Discharge: HOME | End: 2019-05-13
Payer: MEDICARE

## 2019-05-13 PROCEDURE — 97014 ELECTRIC STIMULATION THERAPY: CPT | Performed by: PHYSICAL THERAPIST

## 2019-05-13 PROCEDURE — 97110 THERAPEUTIC EXERCISES: CPT | Performed by: PHYSICAL THERAPIST

## 2019-05-13 PROCEDURE — 97140 MANUAL THERAPY 1/> REGIONS: CPT | Performed by: PHYSICAL THERAPIST

## 2019-05-13 NOTE — PROGRESS NOTES
PT DAILY TREATMENT NOTE - [facility]     Patient Name: Clayton Jerry  Date:2019  : 1945  [x]  Patient  Verified  Payor: Trey Solders / Plan: [place] / Product Type: Medicare /    In time:335   Out time: 411  Total Treatment Time (min): 36  Total Timed Codes (min): 26  1:1 Treatment Time ( only): 26  Visit #:3  of 4     Treatment Area: Right knee pain [M25.561]    SUBJECTIVE  Pain Level IN:(0-10 scale):0/10  Pain Level OUT: (0-10 scale) post treatment: 0/10    Any medication changes, allergies to medications, adverse drug reactions, diagnosis change, or new procedure performed?: [x] No    [] Yes (see summary sheet for update)  Subjective functional status/changes:   [] No changes reported  Pt reports that her R knee pain is significantly improved however, she is unsure as to how much more IMT she can tolerate secondary to pain. Pt wishes to be DC'd as she feels like she can further manage symptoms with the progress and reduction of pain she is currently experiencing.       OBJECTIVE    Modality rationale: decrease inflammation, decrease pain and increase tissue extensibility to improve the patients ability to descend stairs without pain    Type Additional Details   [x] Estim:  [x]Unatt       [x]IFC  []Premod                        []Other:  []w/ice   [x]w/heat  Position: semi-reclined 10 min   Location: R knee    [] Estim: []Att    []TENS instruct  []NMES                    []Other:  []w/US   []w/ice   []w/heat  Position:  Location:   []  Traction: [] Cervical       []Lumbar                       [] Prone          []Supine                       []Intermittent   []Continuous Lbs:  [] before manual  [] after manual   []  Ultrasound: []Continuous   [] Pulsed                           []1MHz   []3MHz W/cm2:  Location:   []  Iontophoresis with dexamethasone         Location: [] Take home patch   [] In clinic   [x]  Ice     []  heat  []  Ice massage  []  Laser    Position: semi reclined on the R knee     :   []  Laser with stim  []  Other:  Position:  Location:   []  Vasopneumatic Device Pressure:       [] lo [] med [] hi   Temperature: [] lo [] med [] hi   [x] Skin assessment post-treatment:  [x]intact []redness- no adverse reaction    []redness  adverse reaction:         26 min Manual Therapy: Technique:      [] S/DTM []IASTM []PROM [] Passive Stretching   [] Graston:  [] GT 1  [] GT 2 [] GT 3 [] GT 4 [] GT 4 [] GT 5  [] GT 6  [] Sweep [] Fan [] Melissa  [] Brush   []  Swivel []J- Stroke [] Scoop []IFraming     []Manual TPR  [x] TDN (see objective; actual needle insertion time not billed)  []Jt manipulation:Gr I [] II []  III [] IV[] V[]  Treatment/Area:  Scar massage to the R knee    Rationale:      decrease pain, increase ROM, increase tissue extensibility and decrease trigger points to improve patient's ability to improve sit to stand tolerance     With   [] TE   [] TA   [] neuro   [] other: Patient Education: [x] Review HEP    [] Progressed/Changed HEP based on:   [] positioning   [] body mechanics   [] transfers   [] heat/ice application    [] other:      Dry Needling Procedure Note    Dry Needle Session Number: 6    Procedure: An intramuscular manual therapy (dry needling) and a neuro-muscular re-education treatment was done to deactivate myofascial trigger points, with a 15/30 gauge solid filament needle, under aseptic technique. Indication(s): [] Muscle spasms [x] Myalgia/Myositis  [] Muscle cramps      [] Muscle imbalances [] TMD (TMJ) [x] Myofascial pain & dysfunction     [] Other: __    Chart reviewed for the following:  Kwesi FLORES PT DPT have reviewed the medical history, summary list and precautions/contraindications for [employer] TIME OUT performed immediately prior to start of procedure:  401 PM (enter time the timeout was completed)  Tiffany FLORES DPT, have performed the following reviews on [employer] prior to the start of the session:      [x] Patient was identified by name and date of birth    [x] Agreement on all muscles being treated was verified   [x] Purpose of dry needling, side effects, possible complications, risks and benefits were explained to the patient   [x] Procedure site(s) verified  [x] Patient was positioned for comfort and draped for privacy  [x] Informed Consent was signed (initial visit) and verified verbally (subsequent visits)  [x] Patient was instructed on the need to report the use of blood thinners and/or immunosuppressant medications  [x] How to respond to possible adverse effects of treatment  [x] Self treatment of post needling soreness: ice, heat (moist heat, heat wraps) and stretching  [x] Opportunity was given to ask any questions, all questions were answered            Treatment:  The following muscles were treated today:    Right: Incision,   Left:      Patients response to todays treatment:   [x]  LTRs  []  Muscle Relaxation  [x]  Pain Relief  []  Decreased Tinnitus  []  Decreased HAs [x]  Post needling soreness []  Increased ROM   []  Other:        Objective/Functional Measures   Noted scar adhesions throughout the R knee incision  FOTO improved to 53/100 from 49/100 at last assessment meeting goal  B hip strength is a 5/5  R knee strength is a 5/5    ASSESSMENT/Changes in Function:  Upon reassessment, pt presents with improved scar mobility and therefore, leading to decreased pain. At this time her tolerance to further IMT is poor and she is requesting DC as she feels she is able to manage all activities with current pain levels. Pt has full R LE strength. Please DC from PT at this time time.             Progress towards goals / Updated goals:     1.  Patient will increase Right hip and knee strength to 5/5 or greater to allow for improved tolerance to physical activity met 5/13/19   2.  Patient will report decreased overall pain level to 1/10 or less during and after activity to allow for improved tolerance to prolonged walking and standingv met 5/13/19   3.  Patient will increase FOTO score to at least 53/100 to indicate overall improved functional mobility met 5/13/19       PLAN  []  Upgrade activities as tolerated     []  Continue plan of care  []  Update interventions per flow sheet       [x]  Discharge due to: all goals met, pt request  []  Other:_     Francine Hernandez, NIKI,   5/13/2019  545  PM    No future appointments.

## 2019-05-20 ENCOUNTER — APPOINTMENT (OUTPATIENT)
Dept: PHYSICAL THERAPY | Age: 74
End: 2019-05-20
Payer: MEDICARE

## 2019-06-11 NOTE — PROGRESS NOTES
[address] [facility]  [address] [address],[address], [place], [place]  Phone [phone]  Fax [phone]  DISCHARGE SUMMARY FOR PHYSICAL THERAPY          Patient Name: Elaine Hull : 1945   Treatment/Medical Diagnosis: Right knee pain [M25.561]   Onset Date: 3/19/18    Referral Source: Jose De Jesus Koenig MD [facility]): 1/3/19   Prior Hospitalization: See Medical History Provider #: [id number]   Prior Level of Function: IND   Comorbidities: OA, HTN, BMI> 30, thyroid problems    Medications: Verified on Patient Summary List   Visits from [facility]: 12 Missed Visits: 9       Goal/Measure of Progress Goal Met? 1.  Patient will increase Right hip and knee strength to 5/5 or greater to allow for improved tolerance to physical activity met 19   2.  Patient will report decreased overall pain level to 1/10 or less during and after activity to allow for improved tolerance to prolonged walking and standingv met 19   3.  Patient will increase FOTO score to at least 53/100 to indicate overall improved functional mobility met 19     Key Functional Changes/Progress: Pt reports that her R knee pain is significantly improved however, she is unsure as to how much more IMT she can tolerate secondary to pain. Pt wishes to be DC'd as she feels like she can further manage symptoms with the progress and reduction of pain she is currently experiencing. Upon reassessment, pt presents with improved scar mobility and therefore, leading to decreased pain. At this time her tolerance to further IMT is poor and she is requesting DC as she feels she is able to manage all activities with current pain levels. Pt has full R LE strength. Please DC from PT at this time time.      Objective/Functional Measures   Noted scar adhesions throughout the R knee incision  FOTO improved to 53/100 from 49/100 at last assessment meeting goal  B hip strength is a 5/5  R knee strength is a 5/5      Assessments/Recommendations: Discontinue therapy. Progressing towards or have reached established goals. If you have any questions/comments please contact us directly at [phone]. Thank you for allowing us to assist in the care of your patient.     Therapist Signature: Johnnie Calle DPT Date: 6/11/19   Reporting Period: 4/8/19-5/13/19 Time: 3:29 PM

## 2019-12-09 ENCOUNTER — HOSPITAL ENCOUNTER (OUTPATIENT)
Dept: MAMMOGRAPHY | Age: 74
Discharge: HOME | End: 2019-12-09
Attending: INTERNAL MEDICINE
Payer: MEDICARE

## 2019-12-09 DIAGNOSIS — Z12.31 VISIT FOR SCREENING MAMMOGRAM: ICD-10-CM

## 2019-12-09 PROCEDURE — 77063 BREAST TOMOSYNTHESIS BI: CPT

## 2020-05-29 ENCOUNTER — IMPORTED ENCOUNTER (OUTPATIENT)
Dept: URBAN - METROPOLITAN AREA CLINIC 1 | Facility: CLINIC | Age: 75
End: 2020-05-29

## 2020-05-29 PROBLEM — H02.834: Noted: 2020-05-29

## 2020-05-29 PROBLEM — H25.813: Noted: 2020-05-29

## 2020-05-29 PROBLEM — H04.123: Noted: 2020-05-29

## 2020-05-29 PROBLEM — H02.831: Noted: 2020-05-29

## 2020-05-29 PROCEDURE — 92015 DETERMINE REFRACTIVE STATE: CPT

## 2020-05-29 PROCEDURE — 92014 COMPRE OPH EXAM EST PT 1/>: CPT

## 2020-05-29 NOTE — PATIENT DISCUSSION
1.  Cataract OU: Observe for now without intervention. The patient was advised to contact us if any change or worsening of vision2. Dry Eyes OU -- Recommended to patient to use Artificial Tears BID OU3. Dermatochalasis OU UL's  - Follow with no intervention at this time. 4. Return for an appointment in 6 months for 10/DFE and Glare. with Dr. Sadie Luna.

## 2020-12-14 ENCOUNTER — HOSPITAL ENCOUNTER (OUTPATIENT)
Dept: MAMMOGRAPHY | Age: 75
Discharge: HOME | End: 2020-12-14
Payer: MEDICARE

## 2020-12-14 DIAGNOSIS — Z12.31 VISIT FOR SCREENING MAMMOGRAM: ICD-10-CM

## 2020-12-14 PROCEDURE — 77063 BREAST TOMOSYNTHESIS BI: CPT

## 2021-12-22 ENCOUNTER — HOSPITAL ENCOUNTER (OUTPATIENT)
Dept: WOMENS IMAGING | Age: 76
Discharge: HOME | End: 2021-12-22
Payer: MEDICARE

## 2021-12-22 DIAGNOSIS — Z12.31 VISIT FOR SCREENING MAMMOGRAM: ICD-10-CM

## 2021-12-22 PROCEDURE — 77063 BREAST TOMOSYNTHESIS BI: CPT

## 2022-03-20 PROBLEM — M17.11 OSTEOARTHRITIS OF RIGHT KNEE: Status: ACTIVE | Noted: 2018-03-14

## 2022-04-03 ASSESSMENT — VISUAL ACUITY
OD_GLARE: 20/80
OD_GLARE: 20/80
OS_GLARE: 20/60
OS_CC: J2
OS_GLARE: 20/60
OS_SC: 20/25
OS_CC: J3
OD_CC: J1
OS_CC: J1
OS_GLARE: 20/80
OD_SC: 20/40
OD_CC: J2
OS_SC: 20/25
OD_GLARE: 20/80
OS_SC: 20/20
OD_CC: J3
OD_SC: 20/25
OS_SC: 20/30
OD_SC: 20/40
OD_SC: 20/40

## 2022-04-03 ASSESSMENT — TONOMETRY
OS_IOP_MMHG: 18
OS_IOP_MMHG: 16
OS_IOP_MMHG: 17
OS_IOP_MMHG: 16
OD_IOP_MMHG: 16
OD_IOP_MMHG: 17
OD_IOP_MMHG: 16
OD_IOP_MMHG: 18

## 2023-02-22 ENCOUNTER — HOSPITAL ENCOUNTER (OUTPATIENT)
Dept: WOMENS IMAGING | Facility: HOSPITAL | Age: 78
Discharge: HOME | End: 2023-02-25
Payer: MEDICARE

## 2023-02-22 DIAGNOSIS — Z12.31 VISIT FOR SCREENING MAMMOGRAM: ICD-10-CM

## 2023-02-22 PROCEDURE — 77063 BREAST TOMOSYNTHESIS BI: CPT

## 2024-04-01 ENCOUNTER — TRANSCRIBE ORDERS (OUTPATIENT)
Facility: HOSPITAL | Age: 79
End: 2024-04-01

## 2024-04-01 DIAGNOSIS — Z12.31 VISIT FOR SCREENING MAMMOGRAM: Primary | ICD-10-CM

## 2024-04-02 ENCOUNTER — HOSPITAL ENCOUNTER (OUTPATIENT)
Dept: WOMENS IMAGING | Facility: HOSPITAL | Age: 79
Discharge: HOME | End: 2024-04-05
Payer: MEDICARE

## 2024-04-02 DIAGNOSIS — Z12.31 VISIT FOR SCREENING MAMMOGRAM: ICD-10-CM

## 2024-04-02 PROCEDURE — 77063 BREAST TOMOSYNTHESIS BI: CPT

## 2025-04-04 ENCOUNTER — HOSPITAL ENCOUNTER (OUTPATIENT)
Dept: WOMENS IMAGING | Facility: HOSPITAL | Age: 80
Discharge: HOME | End: 2025-04-04
Payer: MEDICARE

## 2025-04-04 DIAGNOSIS — Z12.31 VISIT FOR SCREENING MAMMOGRAM: ICD-10-CM

## 2025-04-04 PROCEDURE — 77063 BREAST TOMOSYNTHESIS BI: CPT

## (undated) DEVICE — SYRINGE MED 20ML STD CLR PLAS LUERLOCK TIP N CTRL DISP

## (undated) DEVICE — REM POLYHESIVE ADULT PATIENT RETURN ELECTRODE: Brand: VALLEYLAB

## (undated) DEVICE — Device

## (undated) DEVICE — CONCISE CEMENT SCULPS KIT: Brand: CONCISE

## (undated) DEVICE — PIN GUIDE FIX 3.2X62 MM SCREW [GS9030620324P] [KOMET MEDICAL]

## (undated) DEVICE — BLADE SAW 1.19X20X90 MM FOR LG BNE

## (undated) DEVICE — SOLUTION SCRB 4OZ 10% PVP I POVIDONE IOD TOP PAINT EXIDINE

## (undated) DEVICE — TRAY PHAR SYR 30ML PLAS LUERLOCK TIP N CTRL CONVENIENCE

## (undated) DEVICE — SUT VCRL + 1 36IN CT1 VIO --

## (undated) DEVICE — PIN GUIDE FIX 3.2X62 MM SCREW [GS903A0620322P] [KOMET MEDICAL]

## (undated) DEVICE — SOL IRRIGATION INJ NACL 0.9% 500ML BTL

## (undated) DEVICE — SYR 50ML LR LCK 1ML GRAD NSAF --

## (undated) DEVICE — 3 BONE CEMENT MIXER: Brand: MIXEVAC

## (undated) DEVICE — NDL PRT INJ NSAF BLNT 18GX1.5 --

## (undated) DEVICE — NDL SPNE QNCKE 18GX3.5IN LF --

## (undated) DEVICE — KENDALL SCD EXPRESS FOOT CUFF, MEDIUM: Brand: KENDALL SCD

## (undated) DEVICE — SUTURE STRATAFIX SYMMETRIC PDS + 1 SGL ARMED CT 18 IN LEN SXPP1A405

## (undated) DEVICE — SUT VCRL + 2-0 36IN CT1 UD --

## (undated) DEVICE — PLUS HANDPIECE WITH SPRAY TIP: Brand: SURGILAV

## (undated) DEVICE — UNDERCAST PADDING: Brand: DEROYAL

## (undated) DEVICE — SYR 3ML LL TIP 1/10ML GRAD --

## (undated) DEVICE — NEEDLE SPNL 20GA L3.5IN YEL HUB S STL REG WALL FIT STYL W/

## (undated) DEVICE — SOL INJ L R 1000ML BG --

## (undated) DEVICE — DRSG MEPILES BORDER AG 4X12 -- 5/BX

## (undated) DEVICE — SOLUTION IV 100ML 0.9% SOD CHL DIL INJ

## (undated) DEVICE — GOWN,SIRUS,NONRNF,SETINSLV,XL,20/CS: Brand: MEDLINE

## (undated) DEVICE — THE CANADY HYBRID PLASMA SCALPEL IS AN ELECTROSURGICAL PLASMA SCALPEL THAT USES AN 85MM BENDABLE PADDLE BLADE TIP. THE ELECTROSURGICAL PLASMA SCALPEL IS USED TO SIMULTANEOUSLY CUT AND COAGULATE BIOLOGICAL TISSUE.: Brand: CANADY HYBRID PLASMA PADDLE BLADE

## (undated) DEVICE — ADHESIVE TISS CLOSURE 22X4 CM 4 CC HI VISC EXOFIN

## (undated) DEVICE — BLADE SAW W13XL90MM 1.19MM PARA